# Patient Record
Sex: FEMALE | Race: BLACK OR AFRICAN AMERICAN | NOT HISPANIC OR LATINO | URBAN - METROPOLITAN AREA
[De-identification: names, ages, dates, MRNs, and addresses within clinical notes are randomized per-mention and may not be internally consistent; named-entity substitution may affect disease eponyms.]

---

## 2017-07-15 ENCOUNTER — INPATIENT (INPATIENT)
Facility: HOSPITAL | Age: 62
LOS: 24 days | Discharge: HOME | DRG: 885 | End: 2017-08-09
Attending: STUDENT IN AN ORGANIZED HEALTH CARE EDUCATION/TRAINING PROGRAM | Admitting: STUDENT IN AN ORGANIZED HEALTH CARE EDUCATION/TRAINING PROGRAM
Payer: MEDICARE

## 2017-07-15 VITALS
DIASTOLIC BLOOD PRESSURE: 69 MMHG | RESPIRATION RATE: 18 BRPM | SYSTOLIC BLOOD PRESSURE: 115 MMHG | TEMPERATURE: 98 F | HEART RATE: 102 BPM | OXYGEN SATURATION: 100 % | WEIGHT: 77.82 LBS

## 2017-07-15 DIAGNOSIS — F31.81 BIPOLAR II DISORDER: ICD-10-CM

## 2017-07-15 DIAGNOSIS — Z98.89 OTHER SPECIFIED POSTPROCEDURAL STATES: Chronic | ICD-10-CM

## 2017-07-15 LAB
ALBUMIN SERPL ELPH-MCNC: 4.1 G/DL — SIGNIFICANT CHANGE UP (ref 3.3–5)
ALP SERPL-CCNC: 96 U/L — SIGNIFICANT CHANGE UP (ref 40–120)
ALT FLD-CCNC: 14 U/L — SIGNIFICANT CHANGE UP (ref 10–45)
AMPHET UR-MCNC: NEGATIVE — SIGNIFICANT CHANGE UP
ANION GAP SERPL CALC-SCNC: 11 MMOL/L — SIGNIFICANT CHANGE UP (ref 5–17)
ANION GAP SERPL CALC-SCNC: 13 MMOL/L — SIGNIFICANT CHANGE UP (ref 5–17)
AST SERPL-CCNC: 37 U/L — SIGNIFICANT CHANGE UP (ref 10–40)
BARBITURATES UR SCN-MCNC: NEGATIVE — SIGNIFICANT CHANGE UP
BASOPHILS NFR BLD AUTO: 0.4 % — SIGNIFICANT CHANGE UP (ref 0–2)
BENZODIAZ UR-MCNC: NEGATIVE — SIGNIFICANT CHANGE UP
BILIRUB SERPL-MCNC: 0.2 MG/DL — SIGNIFICANT CHANGE UP (ref 0.2–1.2)
BUN SERPL-MCNC: 21 MG/DL — SIGNIFICANT CHANGE UP (ref 7–23)
BUN SERPL-MCNC: 22 MG/DL — SIGNIFICANT CHANGE UP (ref 7–23)
CALCIUM SERPL-MCNC: 8.8 MG/DL — SIGNIFICANT CHANGE UP (ref 8.4–10.5)
CALCIUM SERPL-MCNC: 9.2 MG/DL — SIGNIFICANT CHANGE UP (ref 8.4–10.5)
CHLORIDE SERPL-SCNC: 104 MMOL/L — SIGNIFICANT CHANGE UP (ref 96–108)
CHLORIDE SERPL-SCNC: 105 MMOL/L — SIGNIFICANT CHANGE UP (ref 96–108)
CO2 SERPL-SCNC: 24 MMOL/L — SIGNIFICANT CHANGE UP (ref 22–31)
CO2 SERPL-SCNC: 24 MMOL/L — SIGNIFICANT CHANGE UP (ref 22–31)
COCAINE METAB.OTHER UR-MCNC: NEGATIVE — SIGNIFICANT CHANGE UP
CREAT SERPL-MCNC: 0.7 MG/DL — SIGNIFICANT CHANGE UP (ref 0.5–1.3)
CREAT SERPL-MCNC: 0.8 MG/DL — SIGNIFICANT CHANGE UP (ref 0.5–1.3)
EOSINOPHIL NFR BLD AUTO: 1.4 % — SIGNIFICANT CHANGE UP (ref 0–6)
GLUCOSE SERPL-MCNC: 122 MG/DL — HIGH (ref 70–99)
GLUCOSE SERPL-MCNC: 93 MG/DL — SIGNIFICANT CHANGE UP (ref 70–99)
HCT VFR BLD CALC: 41.7 % — SIGNIFICANT CHANGE UP (ref 34.5–45)
HGB BLD-MCNC: 14.1 G/DL — SIGNIFICANT CHANGE UP (ref 11.5–15.5)
LYMPHOCYTES # BLD AUTO: 40.3 % — SIGNIFICANT CHANGE UP (ref 13–44)
MCHC RBC-ENTMCNC: 31.2 PG — SIGNIFICANT CHANGE UP (ref 27–34)
MCHC RBC-ENTMCNC: 33.8 G/DL — SIGNIFICANT CHANGE UP (ref 32–36)
MCV RBC AUTO: 92.3 FL — SIGNIFICANT CHANGE UP (ref 80–100)
METHADONE UR-MCNC: NEGATIVE — SIGNIFICANT CHANGE UP
MONOCYTES NFR BLD AUTO: 13.2 % — SIGNIFICANT CHANGE UP (ref 2–14)
NEUTROPHILS NFR BLD AUTO: 44.7 % — SIGNIFICANT CHANGE UP (ref 43–77)
OPIATES UR-MCNC: NEGATIVE — SIGNIFICANT CHANGE UP
PCP SPEC-MCNC: SIGNIFICANT CHANGE UP
PCP UR-MCNC: NEGATIVE — SIGNIFICANT CHANGE UP
PLATELET # BLD AUTO: 274 K/UL — SIGNIFICANT CHANGE UP (ref 150–400)
POTASSIUM SERPL-MCNC: 3.9 MMOL/L — SIGNIFICANT CHANGE UP (ref 3.5–5.3)
POTASSIUM SERPL-MCNC: 5.7 MMOL/L — HIGH (ref 3.5–5.3)
POTASSIUM SERPL-SCNC: 3.9 MMOL/L — SIGNIFICANT CHANGE UP (ref 3.5–5.3)
POTASSIUM SERPL-SCNC: 5.7 MMOL/L — HIGH (ref 3.5–5.3)
PROT SERPL-MCNC: 7.8 G/DL — SIGNIFICANT CHANGE UP (ref 6–8.3)
RBC # BLD: 4.52 M/UL — SIGNIFICANT CHANGE UP (ref 3.8–5.2)
RBC # FLD: 15.4 % — SIGNIFICANT CHANGE UP (ref 10.3–16.9)
SODIUM SERPL-SCNC: 139 MMOL/L — SIGNIFICANT CHANGE UP (ref 135–145)
SODIUM SERPL-SCNC: 142 MMOL/L — SIGNIFICANT CHANGE UP (ref 135–145)
THC UR QL: NEGATIVE — SIGNIFICANT CHANGE UP
VALPROATE SERPL-MCNC: <3 UG/ML — LOW (ref 50–100)
WBC # BLD: 4.8 K/UL — SIGNIFICANT CHANGE UP (ref 3.8–10.5)
WBC # FLD AUTO: 4.8 K/UL — SIGNIFICANT CHANGE UP (ref 3.8–10.5)

## 2017-07-15 PROCEDURE — 93010 ELECTROCARDIOGRAM REPORT: CPT

## 2017-07-15 PROCEDURE — 99285 EMERGENCY DEPT VISIT HI MDM: CPT | Mod: 25

## 2017-07-15 RX ORDER — SODIUM CHLORIDE 9 MG/ML
1000 INJECTION INTRAMUSCULAR; INTRAVENOUS; SUBCUTANEOUS ONCE
Qty: 0 | Refills: 0 | Status: COMPLETED | OUTPATIENT
Start: 2017-07-15 | End: 2017-07-15

## 2017-07-15 RX ORDER — DIVALPROEX SODIUM 500 MG/1
250 TABLET, DELAYED RELEASE ORAL ONCE
Qty: 0 | Refills: 0 | Status: COMPLETED | OUTPATIENT
Start: 2017-07-15 | End: 2017-07-15

## 2017-07-15 RX ORDER — MIRTAZAPINE 45 MG/1
7.5 TABLET, ORALLY DISINTEGRATING ORAL ONCE
Qty: 0 | Refills: 0 | Status: COMPLETED | OUTPATIENT
Start: 2017-07-15 | End: 2017-07-15

## 2017-07-15 RX ORDER — SODIUM CHLORIDE 9 MG/ML
3 INJECTION INTRAMUSCULAR; INTRAVENOUS; SUBCUTANEOUS ONCE
Qty: 0 | Refills: 0 | Status: COMPLETED | OUTPATIENT
Start: 2017-07-15 | End: 2017-07-15

## 2017-07-15 RX ADMIN — DIVALPROEX SODIUM 250 MILLIGRAM(S): 500 TABLET, DELAYED RELEASE ORAL at 21:17

## 2017-07-15 RX ADMIN — MIRTAZAPINE 7.5 MILLIGRAM(S): 45 TABLET, ORALLY DISINTEGRATING ORAL at 21:16

## 2017-07-15 RX ADMIN — SODIUM CHLORIDE 1000 MILLILITER(S): 9 INJECTION INTRAMUSCULAR; INTRAVENOUS; SUBCUTANEOUS at 18:36

## 2017-07-15 RX ADMIN — SODIUM CHLORIDE 3 MILLILITER(S): 9 INJECTION INTRAMUSCULAR; INTRAVENOUS; SUBCUTANEOUS at 16:53

## 2017-07-15 NOTE — ED ADULT NURSE NOTE - OBJECTIVE STATEMENT
Patient to ED alert and orientedx3, complaining of worsening depression for the last 3 days. Patient is accompanied by daughter and denies suicidal/homicidal ideation.

## 2017-07-15 NOTE — ED ADULT TRIAGE NOTE - ARRIVAL INFO ADDITIONAL COMMENTS
Pt presented to ED with depression. Pt denies SI nor HI at this time. Pt's daughter states "every time she drinks, she mentions that she wanna kill herself." Hx  of bipolar. Pt presented to ED with depression and loss of appetite. Pt denies SI nor HI at this time. Pt's daughter states "every time she drinks, she mentions that she wanna kill herself." Pt denies alcohol intake today. Hx of bipolar. No chest pain, no dizziness, no SOB. Pt is A&O x 3 and clam.

## 2017-07-15 NOTE — ED BEHAVIORAL HEALTH ASSESSMENT NOTE - SUMMARY
61 yo AA  female with history of bipolar II d/o, 1 prior admission on [facility] July 2016 for depressed mood and not eating, alcohol use, no prior SA, no legal issues, bib daughter for concern of depression and not eating.  Per ED provider, patient eloped due to feeling upset that she was not informed about coming to ED, she was brought back by [employer] called by nursing supervisor at ED, pt's currently denying SI/HI/AH/VH, no overt anxiety/depressive/manic/hypomanic symptoms seen.  Pt's presentation is most consistent with Bipolar II d/o MRE depressed in context of death of godson and bf, and medication noncompliance.  She does not meet criteria for involuntary admission, voluntary admission was offered but patient decline.  Pt's and her daughter are given outpatient f/u referral and they agree with plan. There is no contraindication for patient to be discharged home once medically cleared.  Case discussed with Dr. Mathew

## 2017-07-15 NOTE — ED BEHAVIORAL HEALTH ASSESSMENT NOTE - RISK ASSESSMENT
Static risk:  prior admission, depressive episode, alcohol use, age, domiciled alone, age.  Modifiable factors:  depressed mood  Protective factor:  supportive family, patient denies SI/HI, and is future forward.

## 2017-07-15 NOTE — ED PROVIDER NOTE - MEDICAL DECISION MAKING DETAILS
px walked out of the ED, alert oriented to person,place,time situation- denies SI- but appears very thin- [employer] contacted- daughter advised to call 911 when she finds her

## 2017-07-15 NOTE — ED BEHAVIORAL HEALTH ASSESSMENT NOTE - HPI (INCLUDE ILLNESS QUALITY, SEVERITY, DURATION, TIMING, CONTEXT, MODIFYING FACTORS, ASSOCIATED SIGNS AND SYMPTOMS)
61 yo AA  female with history of bipolar II d/o, 1 prior admission on [facility] July 2016 for depressed mood and not eating, alcohol use, no prior SA, no legal issues, bib daughter for concern of depression and not eating.  Per ED provider, patient eloped due to feeling upset that she was not informed about coming to ED, she was brought back by [employer] called by nursing supervisor at ED.    Patient is calm and cooperative with interview, she appears very thin, and states that she has been through a lot recently, her godson passed away about 1 month ago, and ex bf also passed away earlier this year.  Due to feeling depressed about her godson's death, she lost her appetite and has decreased po intake of food and fluid, which she describes as typical when she  is depressed.  When her daughter came back from [place] few days ago and got worried due to noticing her losing weight and not eating.  She was brought to the ED by her daughter and son in law, but did not find out where they were going until they got on a taxi.  Patient subsequently got upset because she did not expect this ED visit and also angry because she sense her son in law thought she was a burden to her daughter, and she eloped and left the ED.  She states that for the past two weeks, her sleep has not been good, with low energy, and concentration, but she still cleans her home, and denies of any SI/HI/AH/VH/overty anxiety/manic/hypomanic symptoms.  She states that she is finally feeling better the past two days, and started eating again.  She requested to get food and juice while at the ED as she feels hungry.  She however, denies alcohol drinking which was reported by her daughter, when confronted, she states that she drinks about 1-2 beers no more than twice weekly, denies any intoxication/WD associating symptoms.    As per pt's daughter, Abril Trinidad, patient has been preoccupied about her bf and godson's death and seems to be depressed though still able to take care of her granddaughter of 2 yo.  She does not think her mother is acutely suicide or would do anything to harm herself but because she was not eating/drinking, she is very concerned and wants her to get a medical and psychiatric eval.  She is given outpatient referral and is willing to take her mother to f/u outpatient.    As per chart, pt's VPA is < 3, she is likely not taking home meds Depakote 250mg po bid, and Remeron 7.5mg po qhs.

## 2017-07-15 NOTE — ED PROVIDER NOTE - PROGRESS NOTE DETAILS
returned to ED, seen by sai coleman agrees to be admitted to [facility], weight down 30lbs, not taking meds at home or taking care or herself

## 2017-07-15 NOTE — ED ADULT NURSE REASSESSMENT NOTE - NS ED NURSE REASSESS COMMENT FT1
No 1:1 in place at this time. Patient was assessed and given instructions on what to expect for the duration of ER visit. Patient verbalized understanding. At 1702 patient had eloped from hospital as per patient's daughter. LAURYN Camargo and MD Padgett made aware.

## 2017-07-15 NOTE — ED PROVIDER NOTE - PSYCHIATRIC, MLM
Alert and oriented to person, place, time/situation.denies SI but refusing to talk to me, states she wants to leave

## 2017-07-15 NOTE — ED PROVIDER NOTE - CARE PLAN
Principal Discharge DX:	Depression, unspecified depression type Principal Discharge DX:	Depression, unspecified depression type  Secondary Diagnosis:	Bipolar 2 disorder

## 2017-07-15 NOTE — ED PROVIDER NOTE - OBJECTIVE STATEMENT
62 F hx of bipolar DO and depression- brought in by daughter  px denied SI in front triage and to the primary nurse- to me states   "I want to leave" denies SI- as per daughter- px not taking care of herself  not eating and drinking- has voiced vague SI while drinking ETOH  has prior psych admissions here  denies etoh today  moderate-severe  exac alcohol  is not seeing a therapist as an outpx

## 2017-07-15 NOTE — ED BEHAVIORAL HEALTH ASSESSMENT NOTE - DESCRIPTION
Patient had episode of elopement and was brought back by [facility], after she returned, she was calm and cooperative with ED procedures. denies domiciled alone in [facility], with two adult children

## 2017-07-15 NOTE — ED ADULT NURSE REASSESSMENT NOTE - NS ED NURSE REASSESS COMMENT FT1
Patient previously eloped at 1702, and incident report submitted after Attending physician and ANM were notified. Patient returned to ED at 1736 and was placed on 1:1 with LAURYN Tamayo. Pending medical clearance and psych evaluation.

## 2017-07-16 DIAGNOSIS — F50.9 EATING DISORDER, UNSPECIFIED: ICD-10-CM

## 2017-07-16 LAB
ANION GAP SERPL CALC-SCNC: 14 MMOL/L — SIGNIFICANT CHANGE UP (ref 5–17)
BUN SERPL-MCNC: 23 MG/DL — SIGNIFICANT CHANGE UP (ref 7–23)
CALCIUM SERPL-MCNC: 9.2 MG/DL — SIGNIFICANT CHANGE UP (ref 8.4–10.5)
CHLORIDE SERPL-SCNC: 103 MMOL/L — SIGNIFICANT CHANGE UP (ref 96–108)
CO2 SERPL-SCNC: 25 MMOL/L — SIGNIFICANT CHANGE UP (ref 22–31)
CREAT SERPL-MCNC: 0.8 MG/DL — SIGNIFICANT CHANGE UP (ref 0.5–1.3)
GLUCOSE SERPL-MCNC: 118 MG/DL — HIGH (ref 70–99)
POTASSIUM SERPL-MCNC: 3.9 MMOL/L — SIGNIFICANT CHANGE UP (ref 3.5–5.3)
POTASSIUM SERPL-SCNC: 3.9 MMOL/L — SIGNIFICANT CHANGE UP (ref 3.5–5.3)
SODIUM SERPL-SCNC: 142 MMOL/L — SIGNIFICANT CHANGE UP (ref 135–145)

## 2017-07-16 RX ORDER — DIVALPROEX SODIUM 500 MG/1
250 TABLET, DELAYED RELEASE ORAL
Qty: 0 | Refills: 0 | Status: DISCONTINUED | OUTPATIENT
Start: 2017-07-16 | End: 2017-07-26

## 2017-07-16 RX ORDER — MIRTAZAPINE 45 MG/1
7.5 TABLET, ORALLY DISINTEGRATING ORAL AT BEDTIME
Qty: 0 | Refills: 0 | Status: DISCONTINUED | OUTPATIENT
Start: 2017-07-16 | End: 2017-07-25

## 2017-07-16 RX ADMIN — DIVALPROEX SODIUM 250 MILLIGRAM(S): 500 TABLET, DELAYED RELEASE ORAL at 10:00

## 2017-07-16 RX ADMIN — Medication 1 MILLIGRAM(S): at 21:45

## 2017-07-16 RX ADMIN — MIRTAZAPINE 7.5 MILLIGRAM(S): 45 TABLET, ORALLY DISINTEGRATING ORAL at 21:45

## 2017-07-16 RX ADMIN — DIVALPROEX SODIUM 250 MILLIGRAM(S): 500 TABLET, DELAYED RELEASE ORAL at 21:51

## 2017-07-16 NOTE — PROGRESS NOTE BEHAVIORAL HEALTH - NSBHFUPINTERVALHXFT_PSY_A_CORE
Patient was seen in room this morning, she tells me that she is okay, but that she is starving. She denies suicidal/homicidal ideation/plan/intent. She denies any AVH/paranoia, though of note staff felt like her reasons for not eating were somewhat paranoid. She states that she otherwise feels okay. She is sad that her daughter is worried about her as she was doing much better and has a big family that she enjoys spending time with. She tells me that she is hopeful for the future and hopes that she can start eating more and gaining more weight.

## 2017-07-17 DIAGNOSIS — M32.9 SYSTEMIC LUPUS ERYTHEMATOSUS, UNSPECIFIED: ICD-10-CM

## 2017-07-17 DIAGNOSIS — F31.4 BIPOLAR DISORDER, CURRENT EPISODE DEPRESSED, SEVERE, WITHOUT PSYCHOTIC FEATURES: ICD-10-CM

## 2017-07-17 DIAGNOSIS — F33.3 MAJOR DEPRESSIVE DISORDER, RECURRENT, SEVERE WITH PSYCHOTIC SYMPTOMS: ICD-10-CM

## 2017-07-17 DIAGNOSIS — Z78.9 OTHER SPECIFIED HEALTH STATUS: ICD-10-CM

## 2017-07-17 DIAGNOSIS — F33.2 MAJOR DEPRESSIVE DISORDER, RECURRENT SEVERE WITHOUT PSYCHOTIC FEATURES: ICD-10-CM

## 2017-07-17 PROCEDURE — 99223 1ST HOSP IP/OBS HIGH 75: CPT

## 2017-07-17 RX ORDER — METHOTREXATE 2.5 MG/1
10 TABLET ORAL
Qty: 0 | Refills: 0 | Status: DISCONTINUED | OUTPATIENT
Start: 2017-07-17 | End: 2017-07-17

## 2017-07-17 RX ORDER — HYDROXYCHLOROQUINE SULFATE 200 MG
200 TABLET ORAL
Qty: 0 | Refills: 0 | Status: DISCONTINUED | OUTPATIENT
Start: 2017-07-17 | End: 2017-08-09

## 2017-07-17 RX ORDER — FOLIC ACID 0.8 MG
1 TABLET ORAL DAILY
Qty: 0 | Refills: 0 | Status: DISCONTINUED | OUTPATIENT
Start: 2017-07-17 | End: 2017-08-09

## 2017-07-17 RX ORDER — ERGOCALCIFEROL 1.25 MG/1
50000 CAPSULE ORAL
Qty: 0 | Refills: 0 | Status: DISCONTINUED | OUTPATIENT
Start: 2017-07-17 | End: 2017-08-09

## 2017-07-17 RX ORDER — METHOTREXATE 2.5 MG/1
10 TABLET ORAL
Qty: 0 | Refills: 0 | Status: DISCONTINUED | OUTPATIENT
Start: 2017-07-17 | End: 2017-08-09

## 2017-07-17 RX ADMIN — DIVALPROEX SODIUM 250 MILLIGRAM(S): 500 TABLET, DELAYED RELEASE ORAL at 10:21

## 2017-07-17 RX ADMIN — Medication 200 MILLIGRAM(S): at 16:05

## 2017-07-17 NOTE — BEHAVIORAL HEALTH ASSESSMENT NOTE - DIFFERENTIAL
Bipolar II d/o MRE depressed, r/o eating d/o, other. MDD, severe, recurrent, psychotic features (r/i Bipolar disorder) BAD, MRE depressed

## 2017-07-17 NOTE — BEHAVIORAL HEALTH ASSESSMENT NOTE - CASE SUMMARY
patient is a 63 yo SAAF lives alone, on disability, h/o bipolar disorder, known to undersigned from prior inpatient psychiatric hospitalization at [facility], h/o alcohol and cocaine use, BIB daughter with worsening depression and inability to care for herself in the context of recent loss of her godson and medication non-compliance.  Pt has had decreased appetite, with consequential weight loss, insomnia, low energy, and decreased concetration, along with paranoid ideation re: neighbors and family.   She presents as a thin, emaciated women.   She denies SI/HI/AH/VH.  SHe does not apppear to be resopnding to internal stumuly. Euthymic affect. limited insight into illness.  Labs/Ekg review.  DDX- BAD, MRE depressed, severe, with psychotic features.  Resume VPA 250mg po BID given its effficacy in the past.  Will increase slowly given patient's stature and age.  Augment with Remeron 7.5mg qhs to address low mood and insomnia.  Aware of risk of conversion to edwardo.  benefits of Remeron exceed its risks.  Patient gives verbal consent to speak with her daughter.  Appreciate collateral from daughter as above. Cont tx as above for SLE.  I/G/M tx.  Discharge planning.

## 2017-07-17 NOTE — BEHAVIORAL HEALTH ASSESSMENT NOTE - OTHER
weak, fragile, poor dentition Difficulty walking on account of weakness/muscle atrophy/malnutrition Vague paranoid ideation noted Could not spell WORLD backwards (one letter error) became tearful while discussing loss of godson MMSE 25/30 emaciated, poor dentition

## 2017-07-17 NOTE — BEHAVIORAL HEALTH ASSESSMENT NOTE - DESCRIPTION
denies Lupus Arthritis on treatment "for years". Writer confirmed medication list with pharmacy ([phone]) : MTX 2.5mg 4 tabs Lupus Arthritis on treatment "for years". Writer confirmed medication list with pharmacy ([phone]) : MTX 2.5mg 4 tabs on Fri and Sat; HCQS 200mg PO BID, Prednisone 5mg PO BID, Vit D 50K Units once/wk

## 2017-07-17 NOTE — BEHAVIORAL HEALTH ASSESSMENT NOTE - PROBLEM SELECTOR PLAN 2
Confirmed meds with pharmacy; d/w Medical Consult Service; recommended resumption of OP regimen    -MTX 10mg on Fri and Saturday  -Prednisone 5mg PO BID  Hydroxychloroquine 200mg PO BID  -Vitamin D 50K units once/wk

## 2017-07-17 NOTE — BEHAVIORAL HEALTH ASSESSMENT NOTE - HPI (INCLUDE ILLNESS QUALITY, SEVERITY, DURATION, TIMING, CONTEXT, MODIFYING FACTORS, ASSOCIATED SIGNS AND SYMPTOMS)
62y AAF w/reported hx of Bipolar II, ETOH use BIB daughter w/depressive symptoms and paranoia precipitated/aggravated by recent passing of Godson.Writer spoke w/patient and daughter over phone. Pt has remained confined to her home for 1wk, with noted decrease in appetite, significant weight loss, poor concentration, anhedonia, low energy and disturbed sleep. Daughter noted that she frequently expressed paranoid thoughts (eg-food at restaurant being poisoned).Daughter also noted that pt. has become increasingly irritable over past few weeks and has had a tendency to monae (not a new behavior but possibly more severe than usual).She finally convinced her to come to hosp with great difficulty. Of note, pt. has not been compliant w/psych meds (VPA level <3). Per daughter, she tends to have episodes of excessive ETOH intake during which she stops taking medication and sometimes commences drinking in the morning.However, she has not been drinking in this manner over the last wk (ETOH level on admission <10). No withdrawal signs/symptoms at present. Denies SI/HI/AH/VH; expressed vague paranoid thoughts about "someone" being out to get her although she wasn't sure who. She expressed interest in taking medication, "eating good food", is open to attending groups and is presently not in acute distress. 62y AAF w/reported hx of Bipolar II, ETOH use BIB daughter w/depressive symptoms and paranoia precipitated/aggravated by recent passing of Godson.Writer spoke w/patient and daughter over phone. Pt has remained confined to her home for 1wk, with noted decrease in appetite, significant weight loss, poor concentration, anhedonia, low energy and disturbed sleep. Daughter noted that she frequently expressed paranoid thoughts (eg-food at restaurant being poisoned).Daughter also noted that pt. has become increasingly irritable over past few weeks and has had a tendency to monae (not a new behavior but possibly more severe than usual).She finally convinced her to come to hosp with great difficulty. Of note, pt. has not been compliant w/psych meds (VPA level <3). Per daughter, she tends to have episodes of excessive ETOH intake during which she stops taking medication and sometimes commences drinking in the morning.However, she has not been drinking in this manner over the last wk (ETOH level on admission <10). No withdrawal signs/symptoms at present. Denies SI/HI/AH/VH; expressed vague paranoid thoughts about "someone" being out to get her although she wasn't sure who. She expressed interest in taking medication, "eating good food", is open to attending groups and is presently not in acute distress.    Writer discussed pt's current meds with daughter, confirmed med list with pharmacy ([phone]). Pt. is on medication for SLE (see Plan). 62y AAF w/reported hx of Bipolar II, ETOH use BIB daughter w/depressive symptoms and paranoia precipitated/aggravated by recent passing of Godson.Writer spoke w/patient and daughter over phone. Pt has remained confined to her home for 1wk, with noted decrease in appetite, significant weight loss, poor concentration, anhedonia, low energy and disturbed sleep. Daughter noted that she frequently expressed paranoid thoughts (eg-food at restaurant being poisoned).Daughter also noted that pt. has become increasingly irritable over past few weeks and has had a tendency to monae (not a new behavior but possibly more severe than usual).She finally convinced her to come to hosp with great difficulty. Of note, pt. has not been compliant w/psych meds (VPA level <3). Per daughter, she tends to have episodes of excessive ETOH intake during which she stops taking medication and sometimes commences drinking in the morning.However, she has not been drinking in this manner over the last wk (ETOH level on admission <10). No withdrawal signs/symptoms at present. Denies SI/HI/AH/VH; expressed vague paranoid thoughts about "someone" being out to get her although she wasn't sure who. She expressed interest in taking medication, "eating good food", is open to attending groups and is presently not in acute distress.No si/sy of ETOH withdrawal at present.    Writer discussed pt's current meds with daughter, confirmed med list with pharmacy ([phone]). Pt. is on medication for SLE (see Plan).

## 2017-07-17 NOTE — BEHAVIORAL HEALTH ASSESSMENT NOTE - SUMMARY
63 yo AA  female with history of bipolar II d/o, 1 prior admission on [facility] July 2016 for depressed mood and not eating, alcohol use, no prior SA, no legal issues, bib daughter for concern of depression and not eating.  Per ED provider, patient eloped due to feeling upset that she was not informed about coming to ED, she was brought back by [employer] called by nursing supervisor at ED, pt's currently denying SI/HI/AH/VH, no overt anxiety/depressive/manic/hypomanic symptoms seen.  Pt's presentation is most consistent with Bipolar II d/o MRE depressed in context of death of godson and bf, and medication noncompliance.  She does not meet criteria for involuntary admission, voluntary admission was offered but patient decline.  Pt's and her daughter are given outpatient f/u referral and they agree with plan. There is no contraindication for patient to be discharged home once medically cleared.  Case discussed with Dr. Mathew 61 yo AA  female with reported diagnosis of bipolar II d/o, 1 prior admission on [facility], hx of alcohol use bib daughter w/ concerns of depressive symptoms and paranoid ideation that has worsened over last few weeks, aggravated by passing of godson and medication noncompliance. Pt currently endorses depressive symptoms and expressed vague paranoid ideation. Daughter corroborates pt's self-report of mental state. She is not suicidal/homicidal w/no hx of past attempts.Requires IP admission as she is unable to care for herself due to the severity of her depression. 63 yo AA  female with reported diagnosis of bipolar II d/o, 1 prior admission on [facility], hx of alcohol use bib daughter w/ concerns of depressive symptoms and paranoid ideation that has worsened over last few weeks, aggravated by passing of godson and medication noncompliance. Pt currently endorses depressive symptoms and expressed vague paranoid ideation. Daughter corroborates pt's self-report of mental state. She is not suicidal/homicidal w/no hx of past attempts.Requires IP admission as she is unable to care for herself due to the severity of her depression.She is presently not showing si/reporting sy of ETOH withdrawal. Goal is to optimize psych meds, ie, resume mood stabilizer and mirtazapine, monitor closely for mood improvement and ETOH withdrawal; and make changes to dosage as needed. 61 yo AA  female with reported diagnosis of bipolar II d/o, 1 prior admission on [facility], hx of alcohol use bib daughter w/ concerns of depressive symptoms and paranoid ideation that has worsened over last few weeks, aggravated by passing of godson and medication noncompliance. Pt currently endorses depressive symptoms and expressed vague paranoid ideation. Daughter corroborates pt's self-report of mental state. She is not suicidal/homicidal w/no hx of past attempts.Requires IP admission as she is unable to care for herself due to the severity of her depression.She is presently not showing si/reporting sy of ETOH withdrawal. Goal is to optimize psych meds, ie, resume mood stabilizer and mirtazapine, monitor closely for mood improvement and ETOH withdrawal; and make changes to dosage as needed.Resumed OP meds for SLE. Discussed UA results w/Medical Consult Service : no interventions at present as pt. is aymptomatic 63 yo AA  female with reported diagnosis of bipolar II d/o, 1 prior admission on [facility], hx of alcohol use bib daughter w/ concerns of depressive symptoms and paranoid ideation that has worsened over last few weeks, aggravated by passing of godson and medication noncompliance. Pt currently endorses depressive symptoms and expressed vague paranoid ideation. Daughter corroborates pt's self-report of mental state. She is not suicidal/homicidal w/no hx of past attempts.Requires IP admission as she is unable to care for herself due to the severity of her depression.She is presently not showing si/reporting sy of ETOH withdrawal. Goal is to optimize psych meds, ie, resume mood stabilizer and mirtazapine, monitor closely for mood improvement and ETOH withdrawal; and make changes to dosage as needed.Resumed OP meds for SLE. Discussed UA results w/Medical Consult Service : no interventions at present as pt. is symptomatic

## 2017-07-17 NOTE — BEHAVIORAL HEALTH ASSESSMENT NOTE - PRIMARY DX
Bipolar 2 disorder Major depressive disorder, recurrent episode, severe with anxious distress Bipolar 1 disorder, depressed, severe

## 2017-07-17 NOTE — BEHAVIORAL HEALTH ASSESSMENT NOTE - NSBHCHARTREVIEWVS_PSY_A_CORE FT
Vital Signs Last 24 Hrs  T(C): 36.1 (17 Jul 2017 09:22), Max: 36.9 (16 Jul 2017 23:31)  T(F): 97 (17 Jul 2017 09:22), Max: 98.5 (17 Jul 2017 06:00)  HR: 67 (17 Jul 2017 09:22) (67 - 80)  BP: 121/69 (17 Jul 2017 09:22) (117/72 - 155/71)  BP(mean): --  RR: 20 (17 Jul 2017 09:22) (16 - 20)  SpO2: --

## 2017-07-17 NOTE — BEHAVIORAL HEALTH ASSESSMENT NOTE - NSBHADMITCOUNSEL_PSY_A_CORE
importance of adherence to chosen treatment/instructions for management, treatment and follow up/risk factor reduction/diagnostic results/impressions and/or recommended studies/client/family/caregiver education/prognosis/risks and benefits of treatment options

## 2017-07-17 NOTE — BEHAVIORAL HEALTH ASSESSMENT NOTE - NSBHADMITTHERAPIES_PSY_A_CORE
Mil[facility] Therapy/Group Therapy/Individual Therapy daughter - phone/Individual Therapy/Family Education/Group Therapy/Milieu Therapy

## 2017-07-17 NOTE — BEHAVIORAL HEALTH ASSESSMENT NOTE - NSBHCHARTREVIEWLAB_PSY_A_CORE FT
CBC Full  -  ( 15 Jul 2017 17:54 )  WBC Count : 4.8 K/uL  Hemoglobin : 14.1 g/dL  Hematocrit : 41.7 %  Platelet Count - Automated : 274 K/uL  Mean Cell Volume : 92.3 fL  Mean Cell Hemoglobin : 31.2 pg  Mean Cell Hemoglobin Concentration : 33.8 g/dL  Auto Neutrophil # : x  Auto Lymphocyte # : x  Auto Monocyte # : x  Auto Eosinophil # : x  Auto Basophil # : x  Auto Neutrophil % : 44.7 %  Auto Lymphocyte % : 40.3 %  Auto Monocyte % : 13.2 %  Auto Eosinophil % : 1.4 %  Auto Basophil % : 0.4 %    CBC Full  -  ( 15 Jul 2017 17:54 )  WBC Count : 4.8 K/uL  Hemoglobin : 14.1 g/dL  Hematocrit : 41.7 %  Platelet Count - Automated : 274 K/uL  Mean Cell Volume : 92.3 fL  Mean Cell Hemoglobin : 31.2 pg  Mean Cell Hemoglobin Concentration : 33.8 g/dL  Auto Neutrophil # : x  Auto Lymphocyte # : x  Auto Monocyte # : x  Auto Eosinophil # : x  Auto Basophil # : x  Auto Neutrophil % : 44.7 %  Auto Lymphocyte % : 40.3 %  Auto Monocyte % : 13.2 %  Auto Eosinophil % : 1.4 %  Auto Basophil % : 0.4 %    Urinalysis Basic - ( 15 Jul 2017 19:29 )    Color: Yellow / Appearance: x / S.025 / pH: x  Gluc: x / Ketone: NEGATIVE  / Bili: NEGATIVE / Urobili: 0.2 E.U./dL   Blood: x / Protein: Trace mg/dL / Nitrite: NEGATIVE   Leuk Esterase: NEGATIVE / RBC: < 5 /HPF / WBC 5-10 /HPF   Sq Epi: x / Non Sq Epi: Moderate /HPF / Bacteria: Present /HPF

## 2017-07-17 NOTE — BEHAVIORAL HEALTH ASSESSMENT NOTE - DESCRIPTION (FIRST USE, LAST USE, QUANTITY, FREQUENCY, DURATION)
1-2 beers 2-3 times/weekly 1-2 beers 2-3 times/weekly (daughter notes a binge drinking pattern which is in contrast with patient's self report)

## 2017-07-17 NOTE — BEHAVIORAL HEALTH ASSESSMENT NOTE - AXIS III
denies SLE / Lupus arthritis on Rx (currently on compliant reportedly due to insurance loss) SLE / Lupus arthritis on Rx (currently non compliant reportedly due to insurance loss)

## 2017-07-17 NOTE — BEHAVIORAL HEALTH ASSESSMENT NOTE - NSBHADMITIPSTRENGTH_PSY_A_CORE
Cooperative with treatment Has supportive interpersonal relationships with family, friends or peers/Good impulse control/Has access to housing/residential stability/Cooperative with treatment

## 2017-07-17 NOTE — BEHAVIORAL HEALTH ASSESSMENT NOTE - NSBHADMITTHERAPIESTARGET_PSY_A_CORE FT
Mood regulation; coping skills; interpersonal relatioships; coping w/grief Mood regulation; coping skills; interpersonal relationships; coping w/grief

## 2017-07-17 NOTE — BEHAVIORAL HEALTH ASSESSMENT NOTE - RISK ASSESSMENT
Static risk:  prior admission, depressive episode, alcohol use, age, domiciled alone, age.  Modifiable factors:  depressed mood  Protective factor:  supportive family, patient denies SI/HI, and is future forward. Static risk:  prior admission, depressive episode, alcohol use, age, domiciled alone, age.  Modifiable factors:  depressed mood  Protective factor:  supportive family, patient denies SI/HI, and is future forward.    formulation : considering lack of past attempts and the fact that she presently denies SI (plus daughter's corroboration); pt. is currently at low risk of self-harm. Static risk:  prior admission, depressive episode, alcohol use, age, domiciled alone, age.  Modifiable factors:  depressed mood, recent non compliance with medications, paranoid ideation  Protective factor:  supportive family, patient denies SI/HI/AH/VH, and is future forward.    formulation : considering lack of past attempts and the fact that she presently denies SI (plus daughter's corroboration); pt. is currently at low risk of self-harm.

## 2017-07-18 PROCEDURE — 99232 SBSQ HOSP IP/OBS MODERATE 35: CPT

## 2017-07-18 RX ADMIN — Medication 200 MILLIGRAM(S): at 16:56

## 2017-07-18 RX ADMIN — MIRTAZAPINE 7.5 MILLIGRAM(S): 45 TABLET, ORALLY DISINTEGRATING ORAL at 22:47

## 2017-07-18 RX ADMIN — Medication 200 MILLIGRAM(S): at 07:45

## 2017-07-18 RX ADMIN — DIVALPROEX SODIUM 250 MILLIGRAM(S): 500 TABLET, DELAYED RELEASE ORAL at 10:52

## 2017-07-18 RX ADMIN — ERGOCALCIFEROL 50000 UNIT(S): 1.25 CAPSULE ORAL at 10:52

## 2017-07-18 RX ADMIN — Medication 1 MILLIGRAM(S): at 10:52

## 2017-07-18 RX ADMIN — Medication 5 MILLIGRAM(S): at 22:48

## 2017-07-18 RX ADMIN — Medication 5 MILLIGRAM(S): at 10:52

## 2017-07-18 RX ADMIN — DIVALPROEX SODIUM 250 MILLIGRAM(S): 500 TABLET, DELAYED RELEASE ORAL at 22:48

## 2017-07-18 NOTE — PROGRESS NOTE BEHAVIORAL HEALTH - RISK ASSESSMENT
Static risk:  prior admission, depressive episode, alcohol use, age, domiciled alone, age.  Modifiable factors:  depressed mood, recent non compliance with medications, paranoid ideation  Protective factor:  supportive family, patient denies SI/HI/AH/VH, and is future forward.    formulation : considering lack of past attempts and the fact that she presently denies SI (plus daughter's corroboration); pt. is currently at low risk of self-harm.

## 2017-07-18 NOTE — PROGRESS NOTE BEHAVIORAL HEALTH - NSBHFUPINTERVALHXFT_PSY_A_CORE
Patient is a 63 yo SAAF lives alone, on disability, h/o bipolar disorder, known to undersigned from prior inpatient psychiatric hospitalization at [facility], h/o alcohol and cocaine use, BIB daughter with worsening depression and inability to care for herself in the context of recent loss of her godson and medication non-compliance.  Pt has had decreased appetite, with consequential weight loss, insomnia, low energy, and decreased concentration, along with paranoid ideation re: neighbors and family.   Last night she did not accept hs medications. She remains pleasant and in behavioral control.  She reports good appetite.  Feels safe on the unit.  Visible and attends select groups.  She has been seen eating and drinking her Ensure. She presents as a thin, emaciated women.   She denies SI/HI/AH/VH.  SHe does not appear to be responding to internal stimuli.  Euthymic affect. limited insight into illness.  Labs/Ekg review.  DDX- BAD, MRE depressed, severe, with psychotic features.  Continue VPA 250mg po BID given its effficacy in the past.  Will increase slowly given patient's stature and age.  Augment with Remeron 7.5mg qhs to address low mood and insomnia.  Aware of risk of conversion to edwardo.  Benefits of Remeron exceed its risks.  Psychoeducation provided re: importance of treatment compliance.  Pt expressed understandinand accepted am medications.  Patient gives verbal consent to speak with her daughter.  Appreciate collateral from daughter as above. Cont tx as above for SLE.  I/G/M tx.  Discharge planning.

## 2017-07-18 NOTE — PROGRESS NOTE BEHAVIORAL HEALTH - SUMMARY
61 yo AA  female with reported diagnosis of bipolar II d/o, 1 prior admission on [facility], hx of alcohol use bib daughter w/ concerns of depressive symptoms and paranoid ideation that has worsened over last few weeks, aggravated by passing of godson and medication noncompliance. Pt currently endorses depressive symptoms and expressed vague paranoid ideation. Daughter corroborates pt's self-report of mental state. She is not suicidal/homicidal w/no hx of past attempts.Requires IP admission as she is unable to care for herself due to the severity of her depression.She is presently not showing si/reporting sy of ETOH withdrawal. Goal is to optimize psych meds, ie, resume mood stabilizer and mirtazapine, monitor closely for mood improvement and ETOH withdrawal; and make changes to dosage as needed.Resumed OP meds for SLE. Discussed UA results w/Medical Consult Service : no interventions at present as pt. is symptomatic

## 2017-07-18 NOTE — PROGRESS NOTE BEHAVIORAL HEALTH - NSBHCHARTREVIEWVS_PSY_A_CORE FT
Vital Signs Last 24 Hrs  T(C): 37 (18 Jul 2017 16:32), Max: 37 (18 Jul 2017 16:32)  T(F): 98.6 (18 Jul 2017 16:32), Max: 98.6 (18 Jul 2017 16:32)  HR: 76 (18 Jul 2017 16:32) (62 - 76)  BP: 125/76 (18 Jul 2017 16:32) (118/62 - 125/76)  BP(mean): --  RR: 18 (18 Jul 2017 16:32) (18 - 20)  SpO2: --

## 2017-07-18 NOTE — PROGRESS NOTE BEHAVIORAL HEALTH - OTHER
emaciated, poor dentition Difficulty walking on account of weakness/muscle atrophy/malnutrition became tearful while discussing loss of godson Vague paranoid ideation noted Could not spell WORLD backwards (one letter error) MMSE 25/30

## 2017-07-19 PROCEDURE — 99232 SBSQ HOSP IP/OBS MODERATE 35: CPT

## 2017-07-19 RX ADMIN — Medication 5 MILLIGRAM(S): at 22:19

## 2017-07-19 RX ADMIN — DIVALPROEX SODIUM 250 MILLIGRAM(S): 500 TABLET, DELAYED RELEASE ORAL at 22:19

## 2017-07-19 RX ADMIN — MIRTAZAPINE 7.5 MILLIGRAM(S): 45 TABLET, ORALLY DISINTEGRATING ORAL at 22:19

## 2017-07-19 RX ADMIN — Medication 200 MILLIGRAM(S): at 17:05

## 2017-07-19 RX ADMIN — Medication 200 MILLIGRAM(S): at 07:46

## 2017-07-19 RX ADMIN — Medication 1 MILLIGRAM(S): at 09:53

## 2017-07-19 RX ADMIN — DIVALPROEX SODIUM 250 MILLIGRAM(S): 500 TABLET, DELAYED RELEASE ORAL at 09:52

## 2017-07-19 RX ADMIN — Medication 5 MILLIGRAM(S): at 09:53

## 2017-07-19 NOTE — PROGRESS NOTE BEHAVIORAL HEALTH - OTHER
emaciated, poor dentition Difficulty walking on account of weakness/muscle atrophy/malnutrition became tearful while discussing loss of godson Vague paranoia

## 2017-07-19 NOTE — PROGRESS NOTE BEHAVIORAL HEALTH - NSBHCHARTREVIEWVS_PSY_A_CORE FT
Vital Signs Last 24 Hrs  T(C): 36.4 (19 Jul 2017 10:00), Max: 37 (18 Jul 2017 16:32)  T(F): 97.6 (19 Jul 2017 10:00), Max: 98.6 (18 Jul 2017 16:32)  HR: 61 (19 Jul 2017 10:00) (61 - 76)  BP: 106/52 (19 Jul 2017 10:00) (106/52 - 125/76)  BP(mean): --  RR: 17 (19 Jul 2017 10:00) (17 - 18)  SpO2: --

## 2017-07-19 NOTE — PROGRESS NOTE BEHAVIORAL HEALTH - NSBHFUPINTERVALHXFT_PSY_A_CORE
Patient is a 63 yo SAAF lives alone, on disability, h/o bipolar disorder, known to undersigned from prior inpatient psychiatric hospitalization at [facility], h/o alcohol and cocaine use, BIB daughter with worsening depression and inability to care for herself in the context of recent loss of her godson and medication non-compliance.  Pt has had decreased appetite, with consequential weight loss, insomnia, low energy, and decreased concentration, along with paranoid ideation re: neighbors and family.  She remains pleasant and in behavioral control.  She reports good appetite.  Feels safe on the unit.  Visible and attends select groups.  She has been seen eating and drinking her Ensure. She presents as a thin, emaciated women.   She denies SI/HI/AH/VH.  SHe does not appear to be responding to internal stimuli.  Euthymic affect. limited insight into illness.  Labs/Ekg review.  DDX- BAD, MRE depressed, severe, with psychotic features.  Continue VPA 250mg po BID given its efficacy in the past.  Will increase slowly given patient's stature and age.  Augment with Remeron 7.5mg qhs to address low mood and insomnia.  Aware of risk of conversion to edwardo.  Benefits of Remeron exceed its risks.  Psychoeducation provided re: importance of treatment compliance.  Pt expressed understanding and accepted am medications.  Patient gives verbal consent to speak with her family.  Appreciate collateral from daughter.  Team to follow up with patient's mother as well. Cont tx as above for SLE.  I/G/M tx.  Discharge planning.

## 2017-07-20 PROCEDURE — 99232 SBSQ HOSP IP/OBS MODERATE 35: CPT

## 2017-07-20 RX ORDER — MAGNESIUM HYDROXIDE 400 MG/1
30 TABLET, CHEWABLE ORAL DAILY
Qty: 0 | Refills: 0 | Status: DISCONTINUED | OUTPATIENT
Start: 2017-07-20 | End: 2017-08-09

## 2017-07-20 RX ADMIN — Medication 200 MILLIGRAM(S): at 11:10

## 2017-07-20 RX ADMIN — DIVALPROEX SODIUM 250 MILLIGRAM(S): 500 TABLET, DELAYED RELEASE ORAL at 22:06

## 2017-07-20 RX ADMIN — Medication 200 MILLIGRAM(S): at 18:48

## 2017-07-20 RX ADMIN — Medication 5 MILLIGRAM(S): at 11:10

## 2017-07-20 RX ADMIN — Medication 5 MILLIGRAM(S): at 22:06

## 2017-07-20 RX ADMIN — MIRTAZAPINE 7.5 MILLIGRAM(S): 45 TABLET, ORALLY DISINTEGRATING ORAL at 22:06

## 2017-07-20 RX ADMIN — DIVALPROEX SODIUM 250 MILLIGRAM(S): 500 TABLET, DELAYED RELEASE ORAL at 11:10

## 2017-07-20 RX ADMIN — Medication 1 MILLIGRAM(S): at 11:10

## 2017-07-20 NOTE — PROGRESS NOTE BEHAVIORAL HEALTH - SUMMARY
63 yo AA  female with reported diagnosis of bipolar II d/o, 1 prior admission on [facility], hx of alcohol use bib daughter w/ concerns of depressive symptoms and paranoid ideation that has worsened over last few weeks, aggravated by passing of godson and medication noncompliance. Pt currently endorses depressive symptoms and expressed vague paranoid ideation. Daughter corroborates pt's self-report of mental state. She is not suicidal/homicidal w/no hx of past attempts.Requires IP admission as she is unable to care for herself due to the severity of her depression.She is presently not showing si/reporting sy of ETOH withdrawal. Goal is to optimize psych meds, ie, resume mood stabilizer and mirtazapine, monitor closely for mood improvement and ETOH withdrawal; and make changes to dosage as needed.Resumed OP meds for SLE. Discussed UA results w/Medical Consult Service : no interventions at present as pt. is symptomatic. As of 7/20, there's noted improvement of mood/sleep/appetite and is more visible/active on the unit.

## 2017-07-20 NOTE — PROGRESS NOTE BEHAVIORAL HEALTH - OTHER
emaciated, poor dentition Difficulty walking on account of weakness/muscle atrophy/malnutrition Less paranoid Less depressed became tearful while discussing loss of godson

## 2017-07-20 NOTE — PROGRESS NOTE BEHAVIORAL HEALTH - NSBHFUPINTERVALHXFT_PSY_A_CORE
More visible on unit; greeted writer with smile on approach; reported some improvement in mood/sleep/appetite; writer discussed phone call with daughter; pt. reported that she may visit soon in a positive tone; did not express paranoid thoughts/ideas during today's eval; denied SI/HI/AH/VH; compliant w/standing meds;no side effects noted or observed.

## 2017-07-20 NOTE — PROGRESS NOTE BEHAVIORAL HEALTH - CASE SUMMARY
Patient is a 61 yo SAAF lives alone, on disability, h/o bipolar disorder, known to undersigned from prior inpatient psychiatric hospitalization at [facility], h/o alcohol and cocaine use, BIB daughter with worsening depression and inability to care for herself in the context of recent loss of her godson and medication non-compliance.  Pt has had decreased appetite, with consequential weight loss, insomnia, low energy, and decreased concentration, along with paranoid ideation re: neighbors and family.  She remains pleasant and in behavioral control.  She reports good appetite.  Feels safe on the unit.  Visible and attends select groups.  She has been seen eating and drinking her Ensure. She presents as a thin, emaciated women.   She denies SI/HI/AH/VH.  SHe does not appear to be responding to internal stimuli.  Euthymic affect. limited insight into illness.  No evidence of paranoia elicited today.  Labs/Ekg review.  DDX- BAD, MRE depressed, severe, with psychotic features.  Continue VPA 250mg po BID given its efficacy in the past.  Will increase slowly given patient's stature and age as needed.  Augment with Remeron 7.5mg qhs to address low mood and insomnia.  Aware of risk of conversion to edwardo.  Benefits (incl assistance with mood and appetite) of Remeron exceed its risks.  Psychoeducation provided re: importance of treatment compliance.  Pt expressed understanding and accepted am medications.  Encourage po intake/hydration.  Patient gives verbal consent to speak with her family.  Appreciate collateral from daughter.  Team to follow up with patient's mother as well. Cont tx as above for SLE.  I/G/M tx.  Discharge planning in place.

## 2017-07-21 LAB — VALPROATE SERPL-MCNC: 34 UG/ML — LOW (ref 50–100)

## 2017-07-21 PROCEDURE — 99232 SBSQ HOSP IP/OBS MODERATE 35: CPT

## 2017-07-21 RX ADMIN — DIVALPROEX SODIUM 250 MILLIGRAM(S): 500 TABLET, DELAYED RELEASE ORAL at 22:56

## 2017-07-21 RX ADMIN — Medication 1 MILLIGRAM(S): at 10:23

## 2017-07-21 RX ADMIN — Medication 200 MILLIGRAM(S): at 16:01

## 2017-07-21 RX ADMIN — DIVALPROEX SODIUM 250 MILLIGRAM(S): 500 TABLET, DELAYED RELEASE ORAL at 10:23

## 2017-07-21 RX ADMIN — MIRTAZAPINE 7.5 MILLIGRAM(S): 45 TABLET, ORALLY DISINTEGRATING ORAL at 22:56

## 2017-07-21 RX ADMIN — Medication 5 MILLIGRAM(S): at 22:56

## 2017-07-21 RX ADMIN — METHOTREXATE 10 MILLIGRAM(S): 2.5 TABLET ORAL at 10:22

## 2017-07-21 RX ADMIN — Medication 5 MILLIGRAM(S): at 10:23

## 2017-07-21 RX ADMIN — Medication 200 MILLIGRAM(S): at 07:40

## 2017-07-21 NOTE — PROGRESS NOTE BEHAVIORAL HEALTH - OTHER
emaciated, poor dentition Difficulty walking on account of weakness/muscle atrophy/malnutrition Less depressed became tearful while discussing loss of godson Intermittently anxious

## 2017-07-21 NOTE — PROGRESS NOTE BEHAVIORAL HEALTH - PROBLEM SELECTOR PLAN 1
c/w Depakote 250mg PO BID  c/w Mirtazapine 7.5mg PO QHS (sleep and mood) c/w Depakote 250mg PO BID   c/w Mirtazapine 7.5mg PO QHS (sleep and mood)  VPA level today - 34

## 2017-07-21 NOTE — PROGRESS NOTE BEHAVIORAL HEALTH - NSBHCHARTREVIEWVS_PSY_A_CORE FT
Vital Signs Last 24 Hrs  T(C): 36.4 (21 Jul 2017 09:42), Max: 37 (20 Jul 2017 17:00)  T(F): 97.5 (21 Jul 2017 09:42), Max: 98.6 (20 Jul 2017 17:00)  HR: 67 (21 Jul 2017 09:42) (67 - 75)  BP: 128/57 (21 Jul 2017 09:42) (128/57 - 137/60)  BP(mean): --  RR: 20 (21 Jul 2017 09:42) (17 - 20)  SpO2: --

## 2017-07-21 NOTE — PROGRESS NOTE BEHAVIORAL HEALTH - NSBHFUPINTERVALHXFT_PSY_A_CORE
Reported improvement in mood; smiled at writer on approach; was pleasant throughout conversation; noted to walk around unit more often and engage in group sessions; sleep/appetite have improved; did not express paranoid thoughts/ideas during today's eval; denied SI/HI/AH/VH; compliant w/standing meds;no side effects noted or observed.Writer discussed medcations w/patient; and plan to stay on course with treatment w/discharge discussions to ensure early next week. Reported improvement in mood; smiled at writer on approach; was pleasant throughout conversation; noted to walk around unit more often and engage in group sessions; sleep/appetite have improved; did not express paranoid thoughts/ideas during today's eval; denied SI/HI/AH/VH; compliant w/standing meds;no side effects noted or observed.Writer discussed medcations w/patient; and plan to stay on course with treatment w/discharge discussions to ensue early next week if pt's clinical condition continues to improve over weekend

## 2017-07-21 NOTE — PROGRESS NOTE BEHAVIORAL HEALTH - SUMMARY
61 yo AA  female with reported diagnosis of bipolar II d/o, 1 prior admission on [facility], hx of alcohol use bib daughter w/ concerns of depressive symptoms and paranoid ideation that has worsened over last few weeks, aggravated by passing of godson and medication noncompliance. Pt currently endorses depressive symptoms and expressed vague paranoid ideation. Daughter corroborates pt's self-report of mental state. She is not suicidal/homicidal w/no hx of past attempts.Requires IP admission as she is unable to care for herself due to the severity of her depression.She is presently not showing si/reporting sy of ETOH withdrawal. Goal is to optimize psych meds, ie, resume mood stabilizer and mirtazapine, monitor closely for mood improvement and ETOH withdrawal; and make changes to dosage as needed.Resumed OP meds for SLE. Discussed UA results w/Medical Consult Service : no interventions at present as pt. is symptomatic. As of 7/20, there's noted improvement of mood/sleep/appetite and is more visible/active on the unit. 61 yo AA  female with reported diagnosis of bipolar II d/o, 1 prior admission on [facility], hx of alcohol use bib daughter w/ concerns of depressive symptoms and paranoid ideation that has worsened over last few weeks, aggravated by passing of godson and medication noncompliance. Pt currently endorses depressive symptoms and expressed vague paranoid ideation. Daughter corroborates pt's self-report of mental state. She is not suicidal/homicidal w/no hx of past attempts.Requires IP admission as she is unable to care for herself due to the severity of her depression.She is presently not showing si/reporting sy of ETOH withdrawal. Goal is to optimize psych meds, ie, resume mood stabilizer and mirtazapine, monitor closely for mood improvement and ETOH withdrawal; and make changes to dosage as needed.Resumed OP meds for SLE. Discussed UA results w/Medical Consult Service : no interventions at present as pt. is symptomatic. As of 7/21, there's noted improvement of mood/sleep/appetite and is more visible/active on the unit.

## 2017-07-21 NOTE — PROGRESS NOTE BEHAVIORAL HEALTH - CASE SUMMARY
Patient is a 61 yo SAAF lives alone, on disability, h/o bipolar disorder, known to undersigned from prior inpatient psychiatric hospitalization at [facility], h/o alcohol and cocaine use, BIB daughter with worsening depression and inability to care for herself in the context of recent loss of her godson and medication non-compliance.  Pt has had decreased appetite, with consequential weight loss, insomnia, low energy, and decreased concentration, along with paranoid ideation re: neighbors and family.  She remains pleasant and in behavioral control.  She reports good appetite.  Feels safe on the unit.  Visible and attends select groups.  She has been seen eating and drinking her Ensure. She presents as a thin, emaciated women.   She denies SI/HI/AH/VH.  SHe does not appear to be responding to internal stimuli.  Euthymic affect. limited insight into illness.  No evidence of paranoia elicited today.  Labs/Ekg review.  DDX- BAD, MRE depressed, severe, with psychotic features.  Continue VPA 250mg po BID given its efficacy in the past.  Will increase slowly given patient's stature and age as needed.  Augment with Remeron 7.5mg qhs to address low mood and insomnia.  Aware of risk of conversion to edwardo.  Benefits (incl assistance with mood and appetite) of Remeron exceed its risks.  Psychoeducation provided re: importance of treatment compliance.  Pt expressed understanding and accepted am medications.  Encourage po intake/hydration.  Patient gives verbal consent to speak with her family.  Appreciate collateral from daughter.  Team to follow up with patient's mother as well. Cont tx as above for SLE.  I/G/M tx.  Discharge planning in place. Patient is a 63 yo SAAF lives alone, on disability, h/o bipolar disorder, known to undersigned from prior inpatient psychiatric hospitalization at [facility], h/o alcohol and cocaine use, BIB daughter with worsening depression and inability to care for herself in the context of recent loss of her godson and medication non-compliance.  Pt has had decreased appetite, with consequential weight loss, insomnia, low energy, and decreased concentration, along with paranoid ideation re: neighbors and family.  She remains pleasant and in behavioral control.  She reports good appetite.  Feels safe on the unit.  Visible and attends select groups.  She has been seen eating and drinking her Ensure. She presents as a thin, emaciated women.   She denies SI/HI/AH/VH.  SHe does not appear to be responding to internal stimuli.  Euthymic affect. limited insight into illness.  No evidence of paranoia elicited today.  Labs/Ekg review.  VPA level - 34.  DDX- BAD, MRE depressed, severe, with psychotic features.  Continue VPA 250mg po BID given its efficacy in the past.  Despite subtherapeutic VPA level, will not increase VPA dose at this time given there is no current clinical indication for an increase in medication.  Will increase slowly given patient's stature and age as needed. Augment with Remeron 7.5mg qhs to address low mood and insomnia.  Aware of risk of conversion to edwardo.  Benefits (incl assistance with mood and appetite) of Remeron exceed its risks.  Psychoeducation provided re: importance of treatment compliance.  Pt expressed understanding and accepted am medications.  Encourage po intake/hydration.  Patient gives verbal consent to speak with her family.  Appreciate collateral from daughter.  Team to follow up with patient's mother as well. Cont tx as above for SLE.  I/G/M tx.  Discharge planning in place.

## 2017-07-22 RX ADMIN — METHOTREXATE 10 MILLIGRAM(S): 2.5 TABLET ORAL at 20:04

## 2017-07-22 RX ADMIN — DIVALPROEX SODIUM 250 MILLIGRAM(S): 500 TABLET, DELAYED RELEASE ORAL at 09:49

## 2017-07-22 RX ADMIN — Medication 1 MILLIGRAM(S): at 09:49

## 2017-07-22 RX ADMIN — Medication 200 MILLIGRAM(S): at 16:20

## 2017-07-22 RX ADMIN — Medication 5 MILLIGRAM(S): at 09:49

## 2017-07-22 RX ADMIN — Medication 200 MILLIGRAM(S): at 07:57

## 2017-07-23 RX ADMIN — DIVALPROEX SODIUM 250 MILLIGRAM(S): 500 TABLET, DELAYED RELEASE ORAL at 21:30

## 2017-07-23 RX ADMIN — DIVALPROEX SODIUM 250 MILLIGRAM(S): 500 TABLET, DELAYED RELEASE ORAL at 10:43

## 2017-07-23 RX ADMIN — Medication 200 MILLIGRAM(S): at 15:49

## 2017-07-23 RX ADMIN — Medication 5 MILLIGRAM(S): at 21:30

## 2017-07-23 RX ADMIN — MIRTAZAPINE 7.5 MILLIGRAM(S): 45 TABLET, ORALLY DISINTEGRATING ORAL at 21:30

## 2017-07-23 RX ADMIN — Medication 1 MILLIGRAM(S): at 10:43

## 2017-07-23 RX ADMIN — Medication 200 MILLIGRAM(S): at 10:43

## 2017-07-23 RX ADMIN — Medication 5 MILLIGRAM(S): at 10:43

## 2017-07-24 PROCEDURE — 99232 SBSQ HOSP IP/OBS MODERATE 35: CPT

## 2017-07-24 RX ADMIN — DIVALPROEX SODIUM 250 MILLIGRAM(S): 500 TABLET, DELAYED RELEASE ORAL at 22:56

## 2017-07-24 RX ADMIN — Medication 5 MILLIGRAM(S): at 11:06

## 2017-07-24 RX ADMIN — Medication 200 MILLIGRAM(S): at 07:16

## 2017-07-24 RX ADMIN — Medication 1 MILLIGRAM(S): at 11:06

## 2017-07-24 RX ADMIN — MIRTAZAPINE 7.5 MILLIGRAM(S): 45 TABLET, ORALLY DISINTEGRATING ORAL at 22:52

## 2017-07-24 RX ADMIN — Medication 5 MILLIGRAM(S): at 22:56

## 2017-07-24 RX ADMIN — DIVALPROEX SODIUM 250 MILLIGRAM(S): 500 TABLET, DELAYED RELEASE ORAL at 11:07

## 2017-07-24 RX ADMIN — Medication 200 MILLIGRAM(S): at 16:25

## 2017-07-24 NOTE — PROGRESS NOTE BEHAVIORAL HEALTH - SUMMARY
61 yo AA  female with reported diagnosis of bipolar II d/o, 1 prior admission on [facility], hx of alcohol use bib daughter w/ concerns of depressive symptoms and paranoid ideation that has worsened over last few weeks, aggravated by passing of godson and medication noncompliance. Pt currently endorses depressive symptoms and expressed vague paranoid ideation. Daughter corroborates pt's self-report of mental state. She is not suicidal/homicidal w/no hx of past attempts.Requires IP admission as she is unable to care for herself due to the severity of her depression.She is presently not showing si/reporting sy of ETOH withdrawal. Goal is to optimize psych meds, ie, resume mood stabilizer and mirtazapine, monitor closely for mood improvement and ETOH withdrawal; and make changes to dosage as needed.Resumed OP meds for SLE.

## 2017-07-24 NOTE — PROGRESS NOTE BEHAVIORAL HEALTH - NSBHFUPINTERVALHXFT_PSY_A_CORE
Patient seen in her room. She is laying on bed, doing stretching exercises. She reports that her mood is much better because "they're feeding me now." She states that she wasn't being fed by staff earlier in her admission, but now she is getting the meals that she wants and is feeling better. Appetite is reportedly improved and she states that she is eating 100% of meals. She reports getting restful sleep at night. She is looking forward to having family visit later in the evening. No thoughts of harming self or others. No a/v hallucinations or paranoia.

## 2017-07-25 DIAGNOSIS — F31.9 BIPOLAR DISORDER, UNSPECIFIED: ICD-10-CM

## 2017-07-25 PROCEDURE — 99232 SBSQ HOSP IP/OBS MODERATE 35: CPT

## 2017-07-25 RX ORDER — OLANZAPINE 15 MG/1
2.5 TABLET, FILM COATED ORAL AT BEDTIME
Qty: 0 | Refills: 0 | Status: DISCONTINUED | OUTPATIENT
Start: 2017-07-25 | End: 2017-08-02

## 2017-07-25 RX ADMIN — Medication 1 MILLIGRAM(S): at 10:27

## 2017-07-25 RX ADMIN — Medication 200 MILLIGRAM(S): at 16:30

## 2017-07-25 RX ADMIN — Medication 200 MILLIGRAM(S): at 07:46

## 2017-07-25 RX ADMIN — Medication 5 MILLIGRAM(S): at 10:27

## 2017-07-25 RX ADMIN — Medication 5 MILLIGRAM(S): at 22:36

## 2017-07-25 RX ADMIN — OLANZAPINE 2.5 MILLIGRAM(S): 15 TABLET, FILM COATED ORAL at 22:36

## 2017-07-25 RX ADMIN — DIVALPROEX SODIUM 250 MILLIGRAM(S): 500 TABLET, DELAYED RELEASE ORAL at 10:27

## 2017-07-25 RX ADMIN — DIVALPROEX SODIUM 250 MILLIGRAM(S): 500 TABLET, DELAYED RELEASE ORAL at 22:37

## 2017-07-25 RX ADMIN — ERGOCALCIFEROL 50000 UNIT(S): 1.25 CAPSULE ORAL at 11:05

## 2017-07-25 NOTE — PROGRESS NOTE BEHAVIORAL HEALTH - PROBLEM SELECTOR PLAN 1
c/w Depakote 250mg PO BID   discontinue Mirtazapine 7.5mg PO QHS (sleep and mood)  Zyprexa 2.5mg QHS initiated

## 2017-07-25 NOTE — PROGRESS NOTE BEHAVIORAL HEALTH - NSBHFUPINTERVALHXFT_PSY_A_CORE
Patient seen ambulating in the [facility]o without assisted device. She is pleasant and cooperative. She reports sleeping well last night and is in a good mood today. She is future oriented and states that she had a good visit with her family yesterday. She denies si/hi. No a/v hallucinations or paranoia. Patient shared with me that she was able to read my thoughts and sometimes heard voices in her head, telling her things about other people. She stated she has heard voices for over 37 years. Upon clarification she became irritable and she recanted and stated that there was miscommunication she didn't really hear others thoughts. She is noted to elevated, laughing loudly, gesturing frequently and raising her legs in the air doing exercises.

## 2017-07-25 NOTE — PROGRESS NOTE BEHAVIORAL HEALTH - SUMMARY
61 yo AA  female with reported diagnosis of bipolar II d/o, 1 prior admission on [facility], hx of alcohol use bib daughter w/ concerns of depressive symptoms and paranoid ideation that has worsened over last few weeks, aggravated by passing of godson and medication noncompliance. Pt currently endorses depressive symptoms and expressed vague paranoid ideation. Daughter corroborates pt's self-report of mental state. She is not suicidal/homicidal w/no hx of past attempts.Requires IP admission as she is unable to care for herself due to the severity of her depression.She is presently not showing si/reporting sy of ETOH withdrawal. Goal is to optimize psych meds, ie, resume mood stabilizer and mirtazapine, monitor closely for mood improvement and ETOH withdrawal; and make changes to dosage as needed.Resumed OP meds for SLE.    Patient is noted to be elevated, displaying hypomanic symptoms and expressing hallucinations and delusions. Zyprexa 2.5mg initiated. Will discontinue Remeron 7.5mg.

## 2017-07-26 PROCEDURE — 99232 SBSQ HOSP IP/OBS MODERATE 35: CPT

## 2017-07-26 RX ORDER — DIVALPROEX SODIUM 500 MG/1
500 TABLET, DELAYED RELEASE ORAL AT BEDTIME
Qty: 0 | Refills: 0 | Status: DISCONTINUED | OUTPATIENT
Start: 2017-07-26 | End: 2017-07-31

## 2017-07-26 RX ORDER — DIVALPROEX SODIUM 500 MG/1
250 TABLET, DELAYED RELEASE ORAL DAILY
Qty: 0 | Refills: 0 | Status: DISCONTINUED | OUTPATIENT
Start: 2017-07-27 | End: 2017-07-31

## 2017-07-26 RX ADMIN — OLANZAPINE 2.5 MILLIGRAM(S): 15 TABLET, FILM COATED ORAL at 22:03

## 2017-07-26 RX ADMIN — Medication 1 MILLIGRAM(S): at 10:52

## 2017-07-26 RX ADMIN — Medication 5 MILLIGRAM(S): at 22:03

## 2017-07-26 RX ADMIN — Medication 5 MILLIGRAM(S): at 10:52

## 2017-07-26 RX ADMIN — DIVALPROEX SODIUM 500 MILLIGRAM(S): 500 TABLET, DELAYED RELEASE ORAL at 22:03

## 2017-07-26 RX ADMIN — Medication 200 MILLIGRAM(S): at 18:28

## 2017-07-26 RX ADMIN — Medication 200 MILLIGRAM(S): at 07:58

## 2017-07-26 RX ADMIN — DIVALPROEX SODIUM 250 MILLIGRAM(S): 500 TABLET, DELAYED RELEASE ORAL at 10:52

## 2017-07-26 NOTE — PROGRESS NOTE BEHAVIORAL HEALTH - SUMMARY
63 yo AA  female with reported diagnosis of bipolar II d/o, 1 prior admission on [facility], hx of alcohol use bib daughter w/ concerns of depressive symptoms and paranoid ideation that has worsened over last few weeks, aggravated by passing of godson and medication noncompliance. Pt currently endorses depressive symptoms and expressed vague paranoid ideation. Daughter corroborates pt's self-report of mental state. She is not suicidal/homicidal w/no hx of past attempts.Requires IP admission as she is unable to care for herself due to the severity of her depression.She is presently not showing si/reporting sy of ETOH withdrawal. Goal is to optimize psych meds, ie, resume mood stabilizer and mirtazapine, monitor closely for mood improvement and ETOH withdrawal; and make changes to dosage as needed.Resumed OP meds for SLE.    Patient is tolerating addition of Zyprexa 2.5mg QHS to her regimen well with no reported or observed side effects or adverse reactions. She is noted to be of pressured speech, hyperverbal and with elevated mood. Continued adjustment of medication is warranted to address mood symptoms. Will Increase QHS Depakote to 500mg and continue to observe and monitor.

## 2017-07-26 NOTE — PROGRESS NOTE BEHAVIORAL HEALTH - NSBHFUPINTERVALHXFT_PSY_A_CORE
Patient reports having a good visit with family again last night and had 'the best sleep ever' last night. She reported waking up feeling very rested. She is tolerating medication regimen well, no reported side effects. She states that she feels happy and funny and overall in a good mood. She is noted to be hyperverbal, loudly laughing and dancing and exercising in the halls. She denies a/v hallucinations or paranoia, but states that she is able to read people emotions and qualities. No thoughts of harming herself or others. Is future oriented. She has been attending several groups and seen appropriately interacting with peers.

## 2017-07-27 PROCEDURE — 99232 SBSQ HOSP IP/OBS MODERATE 35: CPT

## 2017-07-27 RX ADMIN — Medication 1 MILLIGRAM(S): at 11:10

## 2017-07-27 RX ADMIN — Medication 5 MILLIGRAM(S): at 11:10

## 2017-07-27 RX ADMIN — Medication 200 MILLIGRAM(S): at 18:31

## 2017-07-27 RX ADMIN — Medication 5 MILLIGRAM(S): at 21:23

## 2017-07-27 RX ADMIN — DIVALPROEX SODIUM 250 MILLIGRAM(S): 500 TABLET, DELAYED RELEASE ORAL at 11:10

## 2017-07-27 RX ADMIN — Medication 200 MILLIGRAM(S): at 07:51

## 2017-07-27 RX ADMIN — OLANZAPINE 2.5 MILLIGRAM(S): 15 TABLET, FILM COATED ORAL at 21:23

## 2017-07-27 RX ADMIN — DIVALPROEX SODIUM 500 MILLIGRAM(S): 500 TABLET, DELAYED RELEASE ORAL at 21:24

## 2017-07-27 NOTE — PROGRESS NOTE BEHAVIORAL HEALTH - NSBHFUPINTERVALHXFT_PSY_A_CORE
Patient is noted to be irritable and sarcastic today. She states that she is unhappy with her meals today as she did not get the meals that she chose. She also stated that she feels ready for discharge as August is quickly approaching and she has bills to pay and 'live my life." She denies any side effects of current medication regimen. No adverse effects noted. Sleep and appetite are reported to be fair. Though she is perseverative on specific foods that she wants, she is able to be verbally redirected. She is visible on the unit at time, attending select groups and observed in her room doing exercises. ADLs are fair and she is noted to be wearing several bracelets that she made during art group. She denies a/v hallucinations.

## 2017-07-27 NOTE — PROGRESS NOTE BEHAVIORAL HEALTH - SUMMARY
61 yo AA  female with reported diagnosis of bipolar II d/o, 1 prior admission on [facility], hx of alcohol use bib daughter w/ concerns of depressive symptoms and paranoid ideation that has worsened over last few weeks, aggravated by passing of godson and medication noncompliance. Pt currently endorses depressive symptoms and expressed vague paranoid ideation. Daughter corroborates pt's self-report of mental state. She is not suicidal/homicidal w/no hx of past attempts.Requires IP admission as she is unable to care for herself due to the severity of her depression.She is presently not showing si/reporting sy of ETOH withdrawal. Goal is to optimize psych meds, ie, resume mood stabilizer and mirtazapine, monitor closely for mood improvement and ETOH withdrawal; and make changes to dosage as needed.Resumed OP meds for SLE.    Patient continues to tolerate medication regimen well with no reported or observed side effects or adverse reactions. Today she is irritable. Will continue current medication regimen and make any necessary changes tomorrow.

## 2017-07-28 PROCEDURE — 99232 SBSQ HOSP IP/OBS MODERATE 35: CPT

## 2017-07-28 RX ADMIN — Medication 5 MILLIGRAM(S): at 22:06

## 2017-07-28 RX ADMIN — Medication 1 MILLIGRAM(S): at 09:48

## 2017-07-28 RX ADMIN — DIVALPROEX SODIUM 500 MILLIGRAM(S): 500 TABLET, DELAYED RELEASE ORAL at 22:06

## 2017-07-28 RX ADMIN — OLANZAPINE 2.5 MILLIGRAM(S): 15 TABLET, FILM COATED ORAL at 22:06

## 2017-07-28 RX ADMIN — Medication 200 MILLIGRAM(S): at 09:49

## 2017-07-28 RX ADMIN — METHOTREXATE 10 MILLIGRAM(S): 2.5 TABLET ORAL at 10:51

## 2017-07-28 RX ADMIN — DIVALPROEX SODIUM 250 MILLIGRAM(S): 500 TABLET, DELAYED RELEASE ORAL at 09:49

## 2017-07-28 RX ADMIN — Medication 5 MILLIGRAM(S): at 09:49

## 2017-07-28 RX ADMIN — Medication 200 MILLIGRAM(S): at 16:30

## 2017-07-28 NOTE — PROGRESS NOTE BEHAVIORAL HEALTH - NSBHFUPINTERVALHXFT_PSY_A_CORE
Patient is seen in her room in her bed writing out her menu for the upcoming day. She perseverates on not getting her desired food choices, specifically peanut butter and jelly sandwiches. She states that if she doesn't get what she wants, she gives her meals away. She has no other concerns, sleep was reported as 'good' she stated that she fell asleep directly after receiving her medications and awoke at 0430 and still feels rested. Appetite is fair. She denies a/v hallucinations or paranoia. No thoughts of harming herself or others.

## 2017-07-28 NOTE — PROGRESS NOTE BEHAVIORAL HEALTH - SUMMARY
63 yo AA  female with reported diagnosis of bipolar II d/o, 1 prior admission on [facility], hx of alcohol use bib daughter w/ concerns of depressive symptoms and paranoid ideation that has worsened over last few weeks, aggravated by passing of godson and medication noncompliance. Pt currently endorses depressive symptoms and expressed vague paranoid ideation. Daughter corroborates pt's self-report of mental state. She is not suicidal/homicidal w/no hx of past attempts.Requires IP admission as she is unable to care for herself due to the severity of her depression.She is presently not showing si/reporting sy of ETOH withdrawal. Goal is to optimize psych meds, ie, resume mood stabilizer and mirtazapine, monitor closely for mood improvement and ETOH withdrawal; and make changes to dosage as needed.Resumed OP meds for SLE.    Patient continues to tolerate medication regimen well with no reported or observed side effects or adverse reactions. She is future oriented, looking forward to visit from her family later in the day and discharge next week. Mood is noted to be elevated and she states that she is 'happy and great'

## 2017-07-29 RX ADMIN — Medication 5 MILLIGRAM(S): at 21:24

## 2017-07-29 RX ADMIN — Medication 200 MILLIGRAM(S): at 16:30

## 2017-07-29 RX ADMIN — DIVALPROEX SODIUM 500 MILLIGRAM(S): 500 TABLET, DELAYED RELEASE ORAL at 21:24

## 2017-07-29 RX ADMIN — OLANZAPINE 2.5 MILLIGRAM(S): 15 TABLET, FILM COATED ORAL at 21:24

## 2017-07-29 RX ADMIN — DIVALPROEX SODIUM 250 MILLIGRAM(S): 500 TABLET, DELAYED RELEASE ORAL at 09:23

## 2017-07-29 RX ADMIN — Medication 5 MILLIGRAM(S): at 09:23

## 2017-07-29 RX ADMIN — Medication 1 MILLIGRAM(S): at 09:23

## 2017-07-29 RX ADMIN — Medication 200 MILLIGRAM(S): at 07:51

## 2017-07-29 RX ADMIN — METHOTREXATE 10 MILLIGRAM(S): 2.5 TABLET ORAL at 16:31

## 2017-07-30 RX ADMIN — OLANZAPINE 2.5 MILLIGRAM(S): 15 TABLET, FILM COATED ORAL at 22:18

## 2017-07-30 RX ADMIN — Medication 5 MILLIGRAM(S): at 22:19

## 2017-07-30 RX ADMIN — DIVALPROEX SODIUM 500 MILLIGRAM(S): 500 TABLET, DELAYED RELEASE ORAL at 22:19

## 2017-07-30 RX ADMIN — Medication 5 MILLIGRAM(S): at 10:45

## 2017-07-30 RX ADMIN — Medication 200 MILLIGRAM(S): at 17:19

## 2017-07-30 RX ADMIN — Medication 200 MILLIGRAM(S): at 07:51

## 2017-07-30 RX ADMIN — DIVALPROEX SODIUM 250 MILLIGRAM(S): 500 TABLET, DELAYED RELEASE ORAL at 10:45

## 2017-07-30 RX ADMIN — Medication 1 MILLIGRAM(S): at 10:45

## 2017-07-31 PROCEDURE — 99232 SBSQ HOSP IP/OBS MODERATE 35: CPT

## 2017-07-31 RX ORDER — DIVALPROEX SODIUM 500 MG/1
500 TABLET, DELAYED RELEASE ORAL
Qty: 0 | Refills: 0 | Status: DISCONTINUED | OUTPATIENT
Start: 2017-07-31 | End: 2017-08-01

## 2017-07-31 RX ORDER — BACITRACIN ZINC 500 UNIT/G
1 OINTMENT IN PACKET (EA) TOPICAL
Qty: 0 | Refills: 0 | Status: DISCONTINUED | OUTPATIENT
Start: 2017-07-31 | End: 2017-08-09

## 2017-07-31 RX ADMIN — Medication 200 MILLIGRAM(S): at 07:30

## 2017-07-31 RX ADMIN — Medication 200 MILLIGRAM(S): at 16:36

## 2017-07-31 RX ADMIN — Medication 5 MILLIGRAM(S): at 10:16

## 2017-07-31 RX ADMIN — Medication 1 MILLIGRAM(S): at 10:17

## 2017-07-31 RX ADMIN — DIVALPROEX SODIUM 500 MILLIGRAM(S): 500 TABLET, DELAYED RELEASE ORAL at 21:44

## 2017-07-31 RX ADMIN — Medication 5 MILLIGRAM(S): at 21:45

## 2017-07-31 RX ADMIN — OLANZAPINE 2.5 MILLIGRAM(S): 15 TABLET, FILM COATED ORAL at 21:45

## 2017-07-31 RX ADMIN — DIVALPROEX SODIUM 250 MILLIGRAM(S): 500 TABLET, DELAYED RELEASE ORAL at 10:15

## 2017-07-31 RX ADMIN — Medication 1 APPLICATION(S): at 21:45

## 2017-07-31 NOTE — PROGRESS NOTE BEHAVIORAL HEALTH - CASE SUMMARY
Patient is a 61 yo SAAF lives alone, on disability, h/o bipolar disorder, known to undersigned from prior inpatient psychiatric hospitalization at [facility], h/o alcohol and cocaine use, BIB daughter with worsening depression and inability to care for herself in the context of recent loss of her godson and medication non-compliance.  Pt has had decreased appetite, with consequential weight loss, insomnia, low energy, and decreased concentration, along with paranoid ideation re: neighbors and family.  This past week, patient presented with elevated mood and increase in paranoia.  Therefore, VPA being optimized, mirtazapine discontinued, and olanzapine started.  Patient remains pleasant and in behavioral control.  She reports good appetite.  Feels safe on the unit.  Visible and attends select groups.  She has been seen eating and drinking her Ensure. She presents as a thin, emaciated women.   She denies SI/HI/AH/VH.  SHe does not appear to be responding to internal stimuli.  + paranoid delusions.  Elevated affect, mildly pressured speech. limited insight into illness.  DDX- BAD, MRE mixed   Labs/Ekg review. Past VPA level - 34 on 250mg po BID, will obtain repeat VPA trough level, CBC (to monitor for thrombocytopenia), CMP (to montior for hepatotoxicity) for tomorrow morning prior to her morning dose of VPA to assess level on increased dose of VPA.  Will continue increasing meds slowly given patient's small body stature/age.  Psychoeducation provided re: importance of treatment compliance.  Pt expressed understanding and accepted am medications.  Encourage po intake/hydration.  Patient gives verbal consent to speak with her family.  Appreciate collateral from daughter.  Team to follow up with patient's mother as well. Cont tx as above for SLE.  I/G/M tx.  Discharge planning in place.

## 2017-07-31 NOTE — PROGRESS NOTE BEHAVIORAL HEALTH - NSBHCHARTREVIEWVS_PSY_A_CORE FT
Vital Signs Last 24 Hrs  T(C): 36.7 (31 Jul 2017 10:29), Max: 36.7 (31 Jul 2017 10:29)  T(F): 98.1 (31 Jul 2017 10:29), Max: 98.1 (31 Jul 2017 10:29)  HR: 88 (31 Jul 2017 10:29) (88 - 89)  BP: 114/67 (31 Jul 2017 10:29) (114/67 - 131/74)  BP(mean): --  RR: 18 (31 Jul 2017 10:29) (18 - 18)  SpO2: --

## 2017-07-31 NOTE — PROGRESS NOTE BEHAVIORAL HEALTH - PROBLEM SELECTOR PLAN 1
VPA level on 8/1 AM  Continue Depakote 250mg PO daily and Depakote 500mg QHS   Zyprexa 2.5mg QHS VPA level on 8/1 AM  Increase Depakote to 500mg PO BID  continue Zyprexa 2.5mg QHS VPA level (trough) on 8/1 AM  Increase Depakote to 500mg PO BID  continue Zyprexa 2.5mg QHS

## 2017-07-31 NOTE — PROGRESS NOTE BEHAVIORAL HEALTH - NSBHFUPINTERVALHXFT_PSY_A_CORE
Evaluated in conference room; mood continues to be elated; became quite tangential during the evaluation and spoke about past events in life that seemed to be somewhat unrelated to the subject of discussion. Reported that her sleep is not disturbed at present. Denied racing thoughts. Not irritable. Preoccupied with choice of food available on the unit. Requested bacitracin ointment for her finger which reportedly got injured when she flushed the toilet(writer examined fingers : no wounds; minor swelling noted near nail fold of left index finger - pt reported that her fingers have been this way for years due to SLE). Reported that her relationship w/daughter and sister have improved since admission; continue to be acutely concerned about losing independence and her daughter making decisions for her. Denied SI/HI/AH/VH; no delusions verbalized; continues to be compliant with standing meds; no side effects noted/observed. Discussed all of patient's medications with her and educated her about the role of mood stabilizers in her current condition.

## 2017-07-31 NOTE — PROGRESS NOTE BEHAVIORAL HEALTH - SUMMARY
63 yo AA  female with reported diagnosis of bipolar II d/o, 1 prior admission on [facility], hx of alcohol use bib daughter w/ concerns of depressive symptoms and paranoid ideation that has worsened over last few weeks, aggravated by passing of godson and medication noncompliance. Pt currently endorses depressive symptoms and expressed vague paranoid ideation. Daughter corroborates pt's self-report of mental state. She is not suicidal/homicidal w/no hx of past attempts.Requires IP admission as she is unable to care for herself due to the severity of her depression.She is presently not showing si/reporting sy of ETOH withdrawal. Goal is to optimize psych meds, ie, resume mood stabilizer and mirtazapine, monitor closely for mood improvement and ETOH withdrawal; and make changes to dosage as needed.Resumed OP meds for SLE.    Patient continues to tolerate medication regimen well with no reported or observed side effects or adverse reactions. She is future oriented, looking forward to visit from her family later in the day and discharge next week. Mood is noted to be elevated and she states that she is 'happy and great' 63 yo AA  female with reported diagnosis of bipolar II d/o, 1 prior admission on [facility], hx of alcohol use bib daughter w/ concerns of depressive symptoms and paranoid ideation that has worsened over last few weeks, aggravated by passing of godson and medication noncompliance. Pt currently endorses depressive symptoms and expressed vague paranoid ideation. Daughter corroborates pt's self-report of mental state. She is not suicidal/homicidal w/no hx of past attempts.Requires IP admission as she is unable to care for herself due to the severity of her depression.She is presently not showing si/reporting sy of ETOH withdrawal. Initially, resumed on mood stabilizer and mirtazapine, monitored closely for mood improvement and ETOH withdrawal and resumed OP meds for SLE. On 7/25, Mirtazapine was discontinued and Zyprexa was started at 2.5mg due to concerns of hypomania/manic symptoms.On 7/26, Depakote was increased from 500mg PO BID to 250mg AM + 500mg PM. Pt continues to have an elated mood and mildly pressured speech though her sleep/appetite are well preserved.Plan is to continue titration of depakote as needed in order to achieve better mood stability.    Patient continues to tolerate medication regimen well with no reported or observed side effects or adverse reactions. She is future oriented, looking forward to visit from her family later in the day and discharge next week. Mood is noted to be elevated and she states that she is 'happy and great' 63 yo AA  female with reported diagnosis of bipolar II d/o, 1 prior admission on [facility], hx of alcohol use bib daughter w/ concerns of depressive symptoms and paranoid ideation that has worsened over last few weeks, aggravated by passing of godson and medication noncompliance. Pt currently endorses depressive symptoms and expressed vague paranoid ideation. Daughter corroborates pt's self-report of mental state. She is not suicidal/homicidal w/no hx of past attempts.Requires IP admission as she is unable to care for herself due to the severity of her depression.She is presently not showing si/reporting sy of ETOH withdrawal. Initially, resumed on mood stabilizer and mirtazapine, monitored closely for mood improvement and ETOH withdrawal and resumed OP meds for SLE. On 7/25, Mirtazapine was discontinued and Zyprexa was started at 2.5mg due to concerns of hypomania/manic symptoms.On 7/26, Depakote was increased from 500mg PO BID to 250mg AM + 500mg PM. Pt continues to have an elated mood and mildly pressured speech though her sleep/appetite are well preserved.Plan is to continue titration of depakote (increase to 500mg BID on 7/31) as needed in order to achieve better mood stability.    Patient continues to tolerate medication regimen well with no reported or observed side effects or adverse reactions. She is future oriented, looking forward to visit from her family later in the day and discharge next week. Mood is noted to be elevated and she states that she is 'happy and great' 63 yo AA  female with reported diagnosis of bipolar II d/o, 1 prior admission on [facility], hx of alcohol use bib daughter w/ concerns of depressive symptoms and paranoid ideation that has worsened over last few weeks, aggravated by passing of godson and medication noncompliance. Pt initially presented with depressive symptoms and expressed vague paranoid ideation. Daughter corroborates pt's self-report of mental state. She is not suicidal/homicidal w/no hx of past attempts.  Required IP admission as she is unable to care for herself due to the severity of her depression.  She is presently not showing si/reporting sy of ETOH withdrawal. Initially, resumed on mood stabilizer and mirtazapine, monitored closely for mood improvement and ETOH withdrawal and resumed OP meds for SLE. On 7/25, Mirtazapine was discontinued and Zyprexa was started at 2.5mg due to concerns of hypomania/manic symptoms.  On 7/26, Depakote was increased from 500mg PO BID to 250mg AM + 500mg PM. Pt continues to have an elated mood and mildly pressured speech though her sleep/appetite are well preserved.Plan is to continue titration of depakote (increase to 500mg BID on 7/31) as needed in order to achieve better mood stability.    Patient continues to tolerate medication regimen well with no reported or observed side effects or adverse reactions. She is future oriented, looking forward to visit from her family later in the day and discharge next week. Mood is noted to be elevated and she states that she is 'happy and great' 63 yo AA  female with reported diagnosis of bipolar II d/o, 1 prior admission on [facility], hx of alcohol use bib daughter w/ concerns of depressive symptoms and paranoid ideation that has worsened over last few weeks, aggravated by passing of godson and medication noncompliance. Pt initially presented with depressive symptoms and expressed vague paranoid ideation. Daughter corroborates pt's self-report of mental state. She is not suicidal/homicidal w/no hx of past attempts.  Required IP admission as she is unable to care for herself due to the severity of her depression.  She is presently not showing si/reporting sy of ETOH withdrawal. Initially, resumed on mood stabilizer and mirtazapine, monitored closely for mood improvement and ETOH withdrawal and resumed OP meds for SLE. On 7/25, Mirtazapine was discontinued and Zyprexa was started at 2.5mg due to concerns of hypomania/manic symptoms.  On 7/26, Depakote was increased from 250mg PO BID to 250mg AM + 500mg PM. Pt continues to have an elated mood and mildly pressured speech though her sleep/appetite are well preserved.  Patient continues to tolerate medication regimen well with no reported or observed side effects or adverse reactions. She is future oriented, looking forward to visit from her family later in the day and discharge next week. Mood is noted to be elevated and she states that she is 'happy and great.'  Plan is to continue titration of depakote (increase to 500mg BID on 7/31) as needed in order to achieve improved mood stability.

## 2017-08-01 LAB
ALBUMIN SERPL ELPH-MCNC: 3.7 G/DL — SIGNIFICANT CHANGE UP (ref 3.3–5)
ALP SERPL-CCNC: 93 U/L — SIGNIFICANT CHANGE UP (ref 40–120)
ALT FLD-CCNC: 18 U/L — SIGNIFICANT CHANGE UP (ref 10–45)
ANION GAP SERPL CALC-SCNC: 10 MMOL/L — SIGNIFICANT CHANGE UP (ref 5–17)
AST SERPL-CCNC: 22 U/L — SIGNIFICANT CHANGE UP (ref 10–40)
BILIRUB SERPL-MCNC: 0.2 MG/DL — SIGNIFICANT CHANGE UP (ref 0.2–1.2)
BUN SERPL-MCNC: 27 MG/DL — HIGH (ref 7–23)
CALCIUM SERPL-MCNC: 9.5 MG/DL — SIGNIFICANT CHANGE UP (ref 8.4–10.5)
CHLORIDE SERPL-SCNC: 104 MMOL/L — SIGNIFICANT CHANGE UP (ref 96–108)
CO2 SERPL-SCNC: 26 MMOL/L — SIGNIFICANT CHANGE UP (ref 22–31)
CREAT SERPL-MCNC: 0.7 MG/DL — SIGNIFICANT CHANGE UP (ref 0.5–1.3)
GLUCOSE SERPL-MCNC: 103 MG/DL — HIGH (ref 70–99)
HCT VFR BLD CALC: 39.4 % — SIGNIFICANT CHANGE UP (ref 34.5–45)
HGB BLD-MCNC: 12.9 G/DL — SIGNIFICANT CHANGE UP (ref 11.5–15.5)
MCHC RBC-ENTMCNC: 30.9 PG — SIGNIFICANT CHANGE UP (ref 27–34)
MCHC RBC-ENTMCNC: 32.7 G/DL — SIGNIFICANT CHANGE UP (ref 32–36)
MCV RBC AUTO: 94.5 FL — SIGNIFICANT CHANGE UP (ref 80–100)
PLATELET # BLD AUTO: 334 K/UL — SIGNIFICANT CHANGE UP (ref 150–400)
POTASSIUM SERPL-MCNC: 4.7 MMOL/L — SIGNIFICANT CHANGE UP (ref 3.5–5.3)
POTASSIUM SERPL-SCNC: 4.7 MMOL/L — SIGNIFICANT CHANGE UP (ref 3.5–5.3)
PROT SERPL-MCNC: 7.2 G/DL — SIGNIFICANT CHANGE UP (ref 6–8.3)
RBC # BLD: 4.17 M/UL — SIGNIFICANT CHANGE UP (ref 3.8–5.2)
RBC # FLD: 15.9 % — SIGNIFICANT CHANGE UP (ref 10.3–16.9)
SODIUM SERPL-SCNC: 140 MMOL/L — SIGNIFICANT CHANGE UP (ref 135–145)
VALPROATE SERPL-MCNC: 90 UG/ML — SIGNIFICANT CHANGE UP (ref 50–100)
WBC # BLD: 9.3 K/UL — SIGNIFICANT CHANGE UP (ref 3.8–10.5)
WBC # FLD AUTO: 9.3 K/UL — SIGNIFICANT CHANGE UP (ref 3.8–10.5)

## 2017-08-01 PROCEDURE — 99232 SBSQ HOSP IP/OBS MODERATE 35: CPT

## 2017-08-01 RX ORDER — DIVALPROEX SODIUM 500 MG/1
500 TABLET, DELAYED RELEASE ORAL AT BEDTIME
Qty: 0 | Refills: 0 | Status: DISCONTINUED | OUTPATIENT
Start: 2017-08-01 | End: 2017-08-09

## 2017-08-01 RX ORDER — DIVALPROEX SODIUM 500 MG/1
250 TABLET, DELAYED RELEASE ORAL DAILY
Qty: 0 | Refills: 0 | Status: DISCONTINUED | OUTPATIENT
Start: 2017-08-01 | End: 2017-08-09

## 2017-08-01 RX ADMIN — Medication 200 MILLIGRAM(S): at 07:33

## 2017-08-01 RX ADMIN — Medication 1 APPLICATION(S): at 10:18

## 2017-08-01 RX ADMIN — Medication 1 MILLIGRAM(S): at 10:17

## 2017-08-01 RX ADMIN — ERGOCALCIFEROL 50000 UNIT(S): 1.25 CAPSULE ORAL at 10:18

## 2017-08-01 RX ADMIN — Medication 200 MILLIGRAM(S): at 17:56

## 2017-08-01 RX ADMIN — Medication 1 APPLICATION(S): at 21:26

## 2017-08-01 RX ADMIN — Medication 5 MILLIGRAM(S): at 10:17

## 2017-08-01 RX ADMIN — OLANZAPINE 2.5 MILLIGRAM(S): 15 TABLET, FILM COATED ORAL at 21:26

## 2017-08-01 RX ADMIN — DIVALPROEX SODIUM 500 MILLIGRAM(S): 500 TABLET, DELAYED RELEASE ORAL at 21:26

## 2017-08-01 RX ADMIN — Medication 5 MILLIGRAM(S): at 21:26

## 2017-08-01 RX ADMIN — DIVALPROEX SODIUM 500 MILLIGRAM(S): 500 TABLET, DELAYED RELEASE ORAL at 10:18

## 2017-08-01 NOTE — PROGRESS NOTE BEHAVIORAL HEALTH - SUMMARY
61 yo AA  female with reported diagnosis of bipolar II d/o, 1 prior admission on [facility], hx of alcohol use bib daughter w/ concerns of depressive symptoms and paranoid ideation that has worsened over last few weeks, aggravated by passing of godson and medication noncompliance. Pt initially presented with depressive symptoms and expressed vague paranoid ideation. Daughter corroborates pt's self-report of mental state. She is not suicidal/homicidal w/no hx of past attempts.  Required IP admission as she is unable to care for herself due to the severity of her depression.  She is presently not showing si/reporting sy of ETOH withdrawal. Initially, resumed on mood stabilizer and mirtazapine, monitored closely for mood improvement and ETOH withdrawal and resumed OP meds for SLE. On 7/25, Mirtazapine was discontinued and Zyprexa was started at 2.5mg due to concerns of hypomania/manic symptoms.  On 7/26, Depakote was increased from 250mg PO BID to 250mg AM + 500mg PM. Pt continues to have an elated mood and mildly pressured speech though her sleep/appetite are well preserved.  Patient continues to tolerate medication regimen well with no reported or observed side effects or adverse reactions. She is future oriented, looking forward to visit from her family later in the day and discharge next week. Mood is noted to be elevated and she states that she is 'happy and great.'  Will hold off titration of depakote given vpa level

## 2017-08-01 NOTE — PROGRESS NOTE BEHAVIORAL HEALTH - NSBHCHARTREVIEWVS_PSY_A_CORE FT
Vital Signs Last 24 Hrs  T(C): 36.4 (01 Aug 2017 08:51), Max: 36.9 (31 Jul 2017 17:00)  T(F): 97.5 (01 Aug 2017 08:51), Max: 98.5 (31 Jul 2017 17:00)  HR: 76 (01 Aug 2017 08:51) (76 - 97)  BP: 145/82 (01 Aug 2017 08:51) (123/76 - 145/82)  BP(mean): --  RR: 18 (01 Aug 2017 08:51) (18 - 18)  SpO2: --

## 2017-08-01 NOTE — PROGRESS NOTE BEHAVIORAL HEALTH - RISK ASSESSMENT
Static risk:  prior admission, depressive episode, alcohol use, age, domiciled alone  Modifiable factors:  recent non compliance with medications, paranoid ideation, elevated mood  Protective factor:  supportive family, patient denies SI/HI/AH/VH, and is future forward.  formulation : considering lack of past attempts and the fact that she presently denies SI (plus daughter's corroboration); pt. is currently at low risk of self-harm.

## 2017-08-01 NOTE — PROGRESS NOTE BEHAVIORAL HEALTH - PROBLEM SELECTOR PLAN 1
Labs reviewed.  VPA level (trough) - 90   Will continue Depakote 250mg qam/ 500mg qhs  continue Zyprexa 2.5mg QHS

## 2017-08-01 NOTE — PROGRESS NOTE BEHAVIORAL HEALTH - NSBHCHARTREVIEWLAB_PSY_A_CORE FT
CBC Full  -  ( 15 Jul 2017 17:54 )  WBC Count : 4.8 K/uL  Hemoglobin : 14.1 g/dL  Hematocrit : 41.7 %  Platelet Count - Automated : 274 K/uL  Mean Cell Volume : 92.3 fL  Mean Cell Hemoglobin : 31.2 pg  Mean Cell Hemoglobin Concentration : 33.8 g/dL  Auto Neutrophil # : x  Auto Lymphocyte # : x  Auto Monocyte # : x  Auto Eosinophil # : x  Auto Basophil # : x  Auto Neutrophil % : 44.7 %  Auto Lymphocyte % : 40.3 %  Auto Monocyte % : 13.2 %  Auto Eosinophil % : 1.4 %  Auto Basophil % : 0.4 %    CBC Full  -  ( 15 Jul 2017 17:54 )  WBC Count : 4.8 K/uL  Hemoglobin : 14.1 g/dL  Hematocrit : 41.7 %  Platelet Count - Automated : 274 K/uL  Mean Cell Volume : 92.3 fL  Mean Cell Hemoglobin : 31.2 pg  Mean Cell Hemoglobin Concentration : 33.8 g/dL  Auto Neutrophil # : x  Auto Lymphocyte # : x  Auto Monocyte # : x  Auto Eosinophil # : x  Auto Basophil # : x  Auto Neutrophil % : 44.7 %  Auto Lymphocyte % : 40.3 %  Auto Monocyte % : 13.2 %  Auto Eosinophil % : 1.4 %  Auto Basophil % : 0.4 %    Urinalysis Basic - ( 15 Jul 2017 19:29 )    Color: Yellow / Appearance: x / S.025 / pH: x  Gluc: x / Ketone: NEGATIVE  / Bili: NEGATIVE / Urobili: 0.2 E.U./dL   Blood: x / Protein: Trace mg/dL / Nitrite: NEGATIVE   Leuk Esterase: NEGATIVE / RBC: < 5 /HPF / WBC 5-10 /HPF   Sq Epi: x / Non Sq Epi: Moderate /HPF / Bacteria: Present /HPF
CBC Full  -  ( 15 Jul 2017 17:54 )  WBC Count : 4.8 K/uL  Hemoglobin : 14.1 g/dL  Hematocrit : 41.7 %  Platelet Count - Automated : 274 K/uL  Mean Cell Volume : 92.3 fL  Mean Cell Hemoglobin : 31.2 pg  Mean Cell Hemoglobin Concentration : 33.8 g/dL  Auto Neutrophil # : x  Auto Lymphocyte # : x  Auto Monocyte # : x  Auto Eosinophil # : x  Auto Basophil # : x  Auto Neutrophil % : 44.7 %  Auto Lymphocyte % : 40.3 %  Auto Monocyte % : 13.2 %  Auto Eosinophil % : 1.4 %  Auto Basophil % : 0.4 %    CBC Full  -  ( 15 Jul 2017 17:54 )  WBC Count : 4.8 K/uL  Hemoglobin : 14.1 g/dL  Hematocrit : 41.7 %  Platelet Count - Automated : 274 K/uL  Mean Cell Volume : 92.3 fL  Mean Cell Hemoglobin : 31.2 pg  Mean Cell Hemoglobin Concentration : 33.8 g/dL  Auto Neutrophil # : x  Auto Lymphocyte # : x  Auto Monocyte # : x  Auto Eosinophil # : x  Auto Basophil # : x  Auto Neutrophil % : 44.7 %  Auto Lymphocyte % : 40.3 %  Auto Monocyte % : 13.2 %  Auto Eosinophil % : 1.4 %  Auto Basophil % : 0.4 %    Urinalysis Basic - ( 15 Jul 2017 19:29 )    Color: Yellow / Appearance: x / S.025 / pH: x  Gluc: x / Ketone: NEGATIVE  / Bili: NEGATIVE / Urobili: 0.2 E.U./dL   Blood: x / Protein: Trace mg/dL / Nitrite: NEGATIVE   Leuk Esterase: NEGATIVE / RBC: < 5 /HPF / WBC 5-10 /HPF   Sq Epi: x / Non Sq Epi: Moderate /HPF / Bacteria: Present /HPF
vpa trough level - 90  CBC Full  -  ( 01 Aug 2017 06:23 )  WBC Count : 9.3 K/uL  Hemoglobin : 12.9 g/dL  Hematocrit : 39.4 %  Platelet Count - Automated : 334 K/uL  Mean Cell Volume : 94.5 fL  Mean Cell Hemoglobin : 30.9 pg  Mean Cell Hemoglobin Concentration : 32.7 g/dL    LIVER FUNCTIONS - ( 01 Aug 2017 06:23 )  Alb: 3.7 g/dL / Pro: 7.2 g/dL / ALK PHOS: 93 U/L / ALT: 18 U/L / AST: 22 U/L / GGT: x
VPA today = 34

## 2017-08-01 NOTE — PROGRESS NOTE BEHAVIORAL HEALTH - NSBHFUPINTERVALHXFT_PSY_A_CORE
Patient is a 61 yo SAAF lives alone, on disability, h/o bipolar disorder, known to undersigned from prior inpatient psychiatric hospitalization at [facility], h/o alcohol and cocaine use, BIB daughter with worsening depression and inability to care for herself in the context of recent loss of her godson and medication non-compliance.  Pt has had decreased appetite, with consequential weight loss, insomnia, low energy, and decreased concentration, along with paranoid ideation re: neighbors and family.  Last week, patient presented with elevated mood and increase in paranoia.  Therefore, VPA was optimized, mirtazapine discontinued, and olanzapine started.    Today, patient continues to have increased energy, with focus on food and paying her bills.  Patient remains pleasant and in behavioral control.  She endorses that thoughts/messages were being sent to her earlier in the week but that is no longer happening.  There seems to be ambivalence about who  was out to get her at this time.  She reports good appetite.  She has been seen eating and drinking her Ensure. Feels safe on the unit.  Visible and attends select groups.  She presents as a thin, emaciated women.   She denies SI/HI/AH/VH.  She does not appear to be responding to internal stimuli.  + paranoid delusions.  Elevated affect, mildly pressured speech. limited insight into illness.    Labs/Ekg reviewed. VPA trough level – 90 (on 250mg qam/500mg qhs).  CBC (to monitor for thrombocytopenia) and CMP (to montior for hepatotoxicity) WNL.   DDX- BAD, MRE mixed     Will continue VPA 250mg qam/500mg qhs given patient’s therapeutic VPA level and gradual improvement. Cont olanzapine 2.5mg qhs. Will continue increasing meds (ie olanzapine) slowly given patient's small body stature/age.  Psychoeducation provided re: importance of treatment compliance.  Pt expressed understanding and accepted am medications.  Encourage po intake/hydration.  Patient gives verbal consent to speak with her family.  Appreciate collateral from daughter.  Team to follow up with patient's mother as well. Cont tx as above for SLE.  I/G/M tx.  Discharge planning in place.

## 2017-08-02 PROCEDURE — 99232 SBSQ HOSP IP/OBS MODERATE 35: CPT

## 2017-08-02 RX ORDER — OLANZAPINE 15 MG/1
5 TABLET, FILM COATED ORAL AT BEDTIME
Qty: 0 | Refills: 0 | Status: DISCONTINUED | OUTPATIENT
Start: 2017-08-02 | End: 2017-08-09

## 2017-08-02 RX ORDER — HALOPERIDOL DECANOATE 100 MG/ML
2 INJECTION INTRAMUSCULAR EVERY 6 HOURS
Qty: 0 | Refills: 0 | Status: DISCONTINUED | OUTPATIENT
Start: 2017-08-02 | End: 2017-08-09

## 2017-08-02 RX ADMIN — OLANZAPINE 5 MILLIGRAM(S): 15 TABLET, FILM COATED ORAL at 21:26

## 2017-08-02 RX ADMIN — Medication 1 APPLICATION(S): at 21:27

## 2017-08-02 RX ADMIN — Medication 200 MILLIGRAM(S): at 07:36

## 2017-08-02 RX ADMIN — Medication 5 MILLIGRAM(S): at 21:26

## 2017-08-02 RX ADMIN — Medication 5 MILLIGRAM(S): at 09:15

## 2017-08-02 RX ADMIN — Medication 200 MILLIGRAM(S): at 16:07

## 2017-08-02 RX ADMIN — Medication 1 APPLICATION(S): at 09:15

## 2017-08-02 RX ADMIN — DIVALPROEX SODIUM 250 MILLIGRAM(S): 500 TABLET, DELAYED RELEASE ORAL at 09:15

## 2017-08-02 RX ADMIN — DIVALPROEX SODIUM 500 MILLIGRAM(S): 500 TABLET, DELAYED RELEASE ORAL at 21:26

## 2017-08-02 RX ADMIN — Medication 1 MILLIGRAM(S): at 09:15

## 2017-08-02 NOTE — PROGRESS NOTE BEHAVIORAL HEALTH - SUMMARY
63 yo AA  female with reported diagnosis of bipolar II d/o, 1 prior admission on [facility], hx of alcohol use bib daughter w/ concerns of depressive symptoms and paranoid ideation that has worsened over last few weeks, aggravated by passing of godson and medication noncompliance. Pt initially presented with depressive symptoms and expressed vague paranoid ideation. Daughter corroborates pt's self-report of mental state. She is not suicidal/homicidal w/no hx of past attempts.  Required IP admission as she is unable to care for herself due to the severity of her depression.  She is presently not showing si/reporting sy of ETOH withdrawal. Initially, resumed on mood stabilizer and mirtazapine, monitored closely for mood improvement and ETOH withdrawal and resumed OP meds for SLE. On 7/25, Mirtazapine was discontinued and Zyprexa was started at 2.5mg due to concerns of hypomania/manic symptoms.  On 7/26, Depakote was increased from 250mg PO BID to 250mg AM + 500mg PM. Pt continues to have an elated mood and mildly pressured speech though her sleep/appetite are well preserved.  Patient continues to tolerate medication regimen well with no reported or observed side effects or adverse reactions.Her mood first became elated ; but soon she started becoming intermittently irritable and labile.  Plan is to continue titration of depakote (increased to 500mg BID on 7/31) and Zyprexa (increased to 5mg on 8/2)  as needed in order to achieve improved mood stability.

## 2017-08-02 NOTE — PROGRESS NOTE BEHAVIORAL HEALTH - OTHER
emaciated, poor dentition Mildly pressured at times intermittently irritable; depressed at other times

## 2017-08-02 NOTE — PROGRESS NOTE BEHAVIORAL HEALTH - NSBHCHARTREVIEWVS_PSY_A_CORE FT
Vital Signs Last 24 Hrs  T(C): 37 (02 Aug 2017 09:00), Max: 37 (02 Aug 2017 09:00)  T(F): 98.6 (02 Aug 2017 09:00), Max: 98.6 (02 Aug 2017 09:00)  HR: 94 (02 Aug 2017 09:00) (93 - 94)  BP: 122/72 (02 Aug 2017 09:00) (122/72 - 126/74)  BP(mean): --  RR: 18 (02 Aug 2017 09:00) (18 - 18)  SpO2: --

## 2017-08-02 NOTE — PROGRESS NOTE BEHAVIORAL HEALTH - CASE SUMMARY
Patient is a 61 yo SAAF lives alone, on disability, h/o bipolar disorder, known to undersigned from prior inpatient psychiatric hospitalization at [facility], h/o alcohol and cocaine use, BIB daughter with worsening depression and inability to care for herself in the context of recent loss of her godson and medication non-compliance.  Pt has had decreased appetite, with consequential weight loss, insomnia, low energy, and decreased concentration, along with paranoid ideation re: neighbors and family.  Last week, patient presented with elevated mood and increase in paranoia.  Therefore, VPA was optimized, mirtazapine discontinued, and olanzapine initiated.    Today, patient is easily irritable in the morning with labile affect.  She is able to be verbally redirected.  She reports feeling “depressed” because of the hospital environment and not getting peanut butter and jelly sandwiches as regularly as she would like. She reports good appetite.  She has been seen eating and drinking her Ensure.  Patient is pleasant and in behavioral control.  She denies transmission of any messages.  Feels safe on the unit.  Visible and attends various groups. She is compliant with medication and denies medication side effects. She presents as a thin, emaciated women.   She denies SI/HI/AH/VH.  She does not appear to be responding to internal stimuli.  No overt delusions.  “depressed” mood, euthymic affect, mildly pressured speech. Improving insight into illness.    Labs/Ekg reviewed. VPA trough level – 90 (on 250mg qam/500mg qhs).  CBC (to monitor for thrombocytopenia) and CMP (to montior for hepatotoxicity) WNL.   DDX- BAD, MRE mixed    Will increase olanzapine to 5mg qhs to better address the irritability, elevated mood and intermittent paranoia. This should also assist with intermittent sleep difficulties.  Change to disintegrating tablets (Zydis) to ensure compliance.  Will continue increasing meds (ie olanzapine) slowly given patient's small body stature/age.  Will continue VPA 250mg qam/500mg qhs given patient’s therapeutic VPA level and gradual mood stabilization. Psychoeducation provided re: importance of treatment compliance upon discharge.  Pt expressed understanding and accepted am medications.  Encourage po intake/hydration.  Patient gives verbal consent to speak with family.  Appreciate collateral info from family.  Cont tx as above for SLE.  I/G/M tx.  Discharge planning in place.

## 2017-08-02 NOTE — PROGRESS NOTE BEHAVIORAL HEALTH - NSBHFUPINTERVALHXFT_PSY_A_CORE
During first eval (writer) today morning; pt was irritable, discharge-focussed, became mildly agitated while discussing her treatment plan but was redirected with ease, became tearful while discussing her financial situation, complained about not being treated well by people (no specifics offered) and stated that she will be putting in a 72h letter.Denied SI/HI/AH/VH; no delusions verbalized; continues to be compliant with standing meds; no side effects noted/observed.     During a second eval with Attending and nurse present (1h later), pt. was much calmer, reported feeling depressed, did not become agitated; was initially DC focussed but did not put in a 72h letter.Attending  addressed her concerns about treatment and diet.Reported that her sleep is intermittently disturbed; appetite is well preserved.Initially spoke of people being rude to her ("animals") but quickly added that there were also people who treated her well ("angels").  She became somewhat pleasant towards the end and agreed to continue her treatment until her mood is more stable. During first eval (writer) today morning; pt was irritable, discharge-focussed, became mildly agitated while discussing her treatment plan but was redirected with ease, became tearful while discussing her financial situation, complained about not being treated well by people (no specifics offered) and stated that she will be putting in a 72h letter.Denied SI/HI/AH/VH; no delusions verbalized; continues to be compliant with standing meds; no side effects noted/observed.   During a second eval with Attending and nurse present (1h later), pt. was much calmer, reported feeling depressed, did not become agitated; was initially DC focussed but did not put in a 72h letter.Attending  addressed her concerns about treatment and diet.Reported that her sleep is intermittently disturbed; appetite is well preserved.Initially spoke of people being rude to her ("animals") but quickly added that there were also people who treated her well ("angels").  She became somewhat pleasant towards the end and agreed to continue her treatment until her mood is more stable. During first eval (writer) today morning; pt was irritable, discharge-focussed, became mildly agitated while discussing her treatment plan but was redirected with ease, became tearful while discussing her financial situation, complained about not being treated well by people (no specifics offered) and stated that she planned to put in a 72h letter. Denied SI/HI/AH/VH; no delusions verbalized; continues to be compliant with standing meds; no side effects noted/observed.   During a second eval with Attending and nurse present (1h later), pt. was much calmer, reported feeling depressed, did not become agitated; was initially DC focussed but did not put in a 72h letter.Attending  addressed her concerns about treatment and diet.Reported that her sleep is intermittently disturbed; appetite is well preserved.Initially spoke of people being rude to her ("animals") but quickly added that there were also people who treated her well ("angels").  She became somewhat pleasant towards the end and agreed to continue her treatment until her mood is more stable.

## 2017-08-02 NOTE — PROGRESS NOTE BEHAVIORAL HEALTH - PROBLEM SELECTOR PLAN 1
VPA level (trough) on 8/1 AM :   c/w Depakote 250mg AM 500mg PM  increase Zyprexa to 5mg QHS VPA level (trough) on 8/1 AM : 90  c/w Depakote 250mg AM 500mg PM  increase Zyprexa to 5mg QHS

## 2017-08-03 PROCEDURE — 99232 SBSQ HOSP IP/OBS MODERATE 35: CPT

## 2017-08-03 RX ADMIN — DIVALPROEX SODIUM 250 MILLIGRAM(S): 500 TABLET, DELAYED RELEASE ORAL at 10:14

## 2017-08-03 RX ADMIN — Medication 200 MILLIGRAM(S): at 07:45

## 2017-08-03 RX ADMIN — Medication 1 APPLICATION(S): at 21:42

## 2017-08-03 RX ADMIN — Medication 5 MILLIGRAM(S): at 10:15

## 2017-08-03 RX ADMIN — Medication 5 MILLIGRAM(S): at 21:42

## 2017-08-03 RX ADMIN — DIVALPROEX SODIUM 500 MILLIGRAM(S): 500 TABLET, DELAYED RELEASE ORAL at 21:42

## 2017-08-03 RX ADMIN — Medication 1 APPLICATION(S): at 10:15

## 2017-08-03 RX ADMIN — Medication 200 MILLIGRAM(S): at 18:07

## 2017-08-03 RX ADMIN — Medication 1 MILLIGRAM(S): at 10:15

## 2017-08-03 RX ADMIN — OLANZAPINE 5 MILLIGRAM(S): 15 TABLET, FILM COATED ORAL at 21:42

## 2017-08-03 NOTE — PROGRESS NOTE BEHAVIORAL HEALTH - OTHER
emaciated, poor dentition Mildly pressured at times intermittently irritable; depressed at other times Congruent Less depressed; less anxious; less irritable

## 2017-08-03 NOTE — PROGRESS NOTE BEHAVIORAL HEALTH - NSBHFUPINTERVALHXFT_PSY_A_CORE
During first eval (writer) today morning; pt was irritable, discharge-focussed, became mildly agitated while discussing her treatment plan but was redirected with ease, became tearful while discussing her financial situation, complained about not being treated well by people (no specifics offered) and stated that she planned to put in a 72h letter. Denied SI/HI/AH/VH; no delusions verbalized; continues to be compliant with standing meds; no side effects noted/observed.   During a second eval with Attending and nurse present (1h later), pt. was much calmer, reported feeling depressed, did not become agitated; was initially DC focussed but did not put in a 72h letter.Attending  addressed her concerns about treatment and diet.Reported that her sleep is intermittently disturbed; appetite is well preserved.Initially spoke of people being rude to her ("animals") but quickly added that there were also people who treated her well ("angels").  She became somewhat pleasant towards the end and agreed to continue her treatment until her mood is more stable. Evaluated in pt's room; more pleasant/cooperative; proudly showed writer all the necklaces she'd made/received : she'd arranged them on her table neatly with artwork, to form an impressive display. Related details of writer's conversation w/daughter yesterday. Reported some improvement in mood; sleep was undirturbed yesterday night; appetite well preserved; no episodes of irritability noted by nurses/reported by patient. Apologized for her behavior yesterday morning. Did not appear to be excited/hypomanic; denied racing thoughts. Denied SI/HI/AH/VH; no delusions verbalized; continues to be compliant with standing meds; no side effects noted/observed. Evaluated in pt's room; more pleasant/cooperative; proudly showed writer all the necklaces she'd made/received : she'd arranged them on her table neatly with artwork, to form an impressive display. Related details of writer's conversation w/daughter yesterday. Reported some improvement in mood; sleep was undisturbed yesterday night; appetite well preserved; no episodes of irritability noted by nurses/reported by patient. Apologized for her behavior yesterday morning. Did not appear to be excited/hypomanic; denied racing thoughts. Denied SI/HI/AH/VH; no delusions verbalized; continues to be compliant with standing meds; no side effects noted/observed.

## 2017-08-03 NOTE — PROGRESS NOTE BEHAVIORAL HEALTH - RISK ASSESSMENT
Static risk:  prior admission, depressive episode, alcohol use, age, domiciled alone, age.  Modifiable factors:  depressed mood, recent non compliance with medications, paranoid ideation  Protective factor:  supportive family, patient denies SI/HI/AH/VH, and is future forward.  formulation : considering lack of past attempts and the fact that she presently denies SI (plus daughter's corroboration); pt. is currently at low risk of self-harm. Static risk:  prior admission, depressive episode, alcohol use, age, domiciled alone, age.  Modifiable factors:  depressed mood (improved mood), recent non compliance with medications (currently treatment compliant with a therapeutic vpa level), paranoid ideation (no further evidence of psychosis/edwardo)  Protective factor:  supportive family, patient denies SI/HI/AH/VH, and is future forward.  Risk Formulation : considering lack of past attempts and the fact that she presently denies SI (plus daughter's corroboration); pt. is currently at low risk of harm to self or others.

## 2017-08-03 NOTE — PROGRESS NOTE BEHAVIORAL HEALTH - PROBLEM SELECTOR PLAN 1
VPA level (trough) on 8/1 AM : 90  c/w Depakote 250mg AM 500mg PM  increase Zyprexa to 5mg QHS c/w Depakote 250mg AM 500mg PM  c/w Zyprexa to 5mg QHS  VPA level (trough) on 8/1 AM : 90

## 2017-08-03 NOTE — PROGRESS NOTE BEHAVIORAL HEALTH - NSBHCHARTREVIEWVS_PSY_A_CORE FT
Vital Signs Last 24 Hrs  T(C): 37 (02 Aug 2017 09:00), Max: 37 (02 Aug 2017 09:00)  T(F): 98.6 (02 Aug 2017 09:00), Max: 98.6 (02 Aug 2017 09:00)  HR: 94 (02 Aug 2017 09:00) (93 - 94)  BP: 122/72 (02 Aug 2017 09:00) (122/72 - 126/74)  BP(mean): --  RR: 18 (02 Aug 2017 09:00) (18 - 18)  SpO2: -- Vital Signs Last 24 Hrs  T(C): 36.6 (03 Aug 2017 09:43), Max: 36.6 (02 Aug 2017 17:00)  T(F): 97.8 (03 Aug 2017 09:43), Max: 97.9 (02 Aug 2017 17:00)  HR: 73 (03 Aug 2017 09:43) (73 - 96)  BP: 110/70 (03 Aug 2017 09:43) (110/63 - 110/70)  BP(mean): --  RR: 17 (03 Aug 2017 09:43) (16 - 17)  SpO2: 99% (02 Aug 2017 17:00) (99% - 99%)

## 2017-08-03 NOTE — PROGRESS NOTE BEHAVIORAL HEALTH - CASE SUMMARY
Patient is a 61 yo SAAF lives alone, on disability, h/o bipolar disorder, known to undersigned from prior inpatient psychiatric hospitalization at [facility], h/o alcohol and cocaine use, BIB daughter with worsening depression and inability to care for herself in the context of recent loss of her godson and medication non-compliance.  Pt has had decreased appetite, with consequential weight loss, insomnia, low energy, and decreased concentration, along with paranoid ideation re: neighbors and family.  Last week, patient presented with elevated mood and increase in paranoia.  Therefore, VPA was optimized, mirtazapine discontinued, and olanzapine initiated.    Today, patient is calm with euthymic affect today.  She tolerated the increase in olanzapine last night and slept well last night.  She reports improved mood and looks forward to discharge soon.  She reports good appetite and is seen eating appropriately.  Patient is pleasant and in behavioral control.  She denies paranoia/psychosis.  Feels safe on the unit.  Visible and attends various groups. She is compliant with medication and denies medication side effects. She presents as a thin women.   She denies SI/HI/AH/VH.  She does not appear to be responding to internal stimuli.  No overt delusions.  “good” mood, euthymic affect, RRR. Improving insight into illness.    Labs/Ekg reviewed. VPA trough level – 90 (on 250mg qam/500mg qhs).  CBC (to monitor for thrombocytopenia) and CMP (to monitor for hepatotoxicity) WNL.   DDX- BAD, MRE mixed    Will continue olanzapine disintegrating tablets (Zyprexa Zydis) to 5mg qhs for mood lability associated with BAD and intermittent sleep difficulties.  Slow titrations given patient's small body stature/age.  Will continue VPA 250mg qam/500mg qhs given patient’s therapeutic VPA level and gradual mood stabilization. Psychoeducation provided re: importance of treatment compliance upon discharge.  Pt expressed understanding and accepted am medications.  Encourage po intake/hydration.  Patient gives verbal consent to speak with family.  Appreciate collateral info from family.  Cont tx as above for SLE.  I/G/M tx.  Discharge planning in place, likely discharge tomorrow.

## 2017-08-04 PROCEDURE — 99232 SBSQ HOSP IP/OBS MODERATE 35: CPT

## 2017-08-04 RX ADMIN — Medication 1 MILLIGRAM(S): at 09:47

## 2017-08-04 RX ADMIN — Medication 5 MILLIGRAM(S): at 09:48

## 2017-08-04 RX ADMIN — METHOTREXATE 10 MILLIGRAM(S): 2.5 TABLET ORAL at 10:14

## 2017-08-04 RX ADMIN — Medication 1 APPLICATION(S): at 22:01

## 2017-08-04 RX ADMIN — Medication 5 MILLIGRAM(S): at 22:01

## 2017-08-04 RX ADMIN — DIVALPROEX SODIUM 250 MILLIGRAM(S): 500 TABLET, DELAYED RELEASE ORAL at 09:48

## 2017-08-04 RX ADMIN — OLANZAPINE 5 MILLIGRAM(S): 15 TABLET, FILM COATED ORAL at 22:01

## 2017-08-04 RX ADMIN — Medication 200 MILLIGRAM(S): at 07:40

## 2017-08-04 RX ADMIN — DIVALPROEX SODIUM 500 MILLIGRAM(S): 500 TABLET, DELAYED RELEASE ORAL at 22:01

## 2017-08-04 RX ADMIN — Medication 1 APPLICATION(S): at 09:48

## 2017-08-04 NOTE — PROGRESS NOTE BEHAVIORAL HEALTH - OTHER
emaciated, poor dentition Less depressed; less anxious; less irritable Congruent Tends to become a little fixated on subjects related to nutrition/weight/body image; still mildly tangential Improvement in mood; not irritable

## 2017-08-04 NOTE — PROGRESS NOTE BEHAVIORAL HEALTH - NSBHCHARTREVIEWVS_PSY_A_CORE FT
Vital Signs Last 24 Hrs  T(C): 36.6 (03 Aug 2017 09:43), Max: 36.6 (02 Aug 2017 17:00)  T(F): 97.8 (03 Aug 2017 09:43), Max: 97.9 (02 Aug 2017 17:00)  HR: 73 (03 Aug 2017 09:43) (73 - 96)  BP: 110/70 (03 Aug 2017 09:43) (110/63 - 110/70)  BP(mean): --  RR: 17 (03 Aug 2017 09:43) (16 - 17)  SpO2: 99% (02 Aug 2017 17:00) (99% - 99%) Vital Signs Last 24 Hrs  T(C): 36.4 (04 Aug 2017 10:00), Max: 36.7 (03 Aug 2017 17:00)  T(F): 97.6 (04 Aug 2017 10:00), Max: 98.1 (03 Aug 2017 17:00)  HR: 105 (04 Aug 2017 10:00) (105 - 116)  BP: 118/74 (04 Aug 2017 10:00) (118/74 - 137/79)  BP(mean): --  RR: 18 (04 Aug 2017 10:00) (18 - 20)  SpO2: --

## 2017-08-04 NOTE — PROGRESS NOTE BEHAVIORAL HEALTH - NSBHFUPINTERVALHXFT_PSY_A_CORE
Evaluated in pt's room; more pleasant/cooperative; proudly showed writer all the necklaces she'd made/received : she'd arranged them on her table neatly with artwork, to form an impressive display. Related details of writer's conversation w/daughter yesterday. Reported some improvement in mood; sleep was undisturbed yesterday night; appetite well preserved; no episodes of irritability noted by nurses/reported by patient. Apologized for her behavior yesterday morning. Did not appear to be excited/hypomanic; denied racing thoughts. Denied SI/HI/AH/VH; no delusions verbalized; continues to be compliant with standing meds; no side effects noted/observed. Evaluated in the morning post-breakfast; pleasant and forthcoming; improved frustration tolerance; reported having slept well overnight; appetite well preserved; mood is notably better (did not become irritable/impatient during eval); still somewhat tangential with a tendency to become fixated on subjects (eg-food/weight/body image/Vit D supplementation) : expressed concern about becoming obese. Writer reassured pt that she is not overweight/encouraged her to continue having regular meals; informed her that she is already on Vit D supplementation (see Plan). Denied SI/HI/AH/VH; no delusions verbalized; compliant w/standing meds; no side-effects noted/observed.  Discussed SW's conversation w/her daughter yesterday. Pt. appreciates plan for discharge on Monday if she is clinically stable over weekend.

## 2017-08-04 NOTE — PROGRESS NOTE BEHAVIORAL HEALTH - RISK ASSESSMENT
Static risk:  prior admission, depressive episode, alcohol use, age, domiciled alone, age.  Modifiable factors:  depressed mood (improved mood), recent non compliance with medications (currently treatment compliant with a therapeutic vpa level), paranoid ideation (no further evidence of psychosis/edwardo)  Protective factor:  supportive family, patient denies SI/HI/AH/VH, and is future forward.  Risk Formulation : considering lack of past attempts and the fact that she presently denies SI (plus daughter's corroboration); pt. is currently at low risk of harm to self or others.

## 2017-08-04 NOTE — PROGRESS NOTE BEHAVIORAL HEALTH - CASE SUMMARY
Patient is a 61 yo SAAF lives alone, on disability, h/o bipolar disorder, known to undersigned from prior inpatient psychiatric hospitalization at [facility], h/o alcohol and cocaine use, BIB daughter with worsening depression and inability to care for herself in the context of recent loss of her godson and medication non-compliance.  Pt has had decreased appetite, with consequential weight loss, insomnia, low energy, and decreased concentration, along with paranoid ideation re: neighbors and family.  Last week, patient presented with elevated mood and increase in paranoia.  Therefore, VPA was optimized, mirtazapine discontinued, and olanzapine initiated.    Today, patient is calm with euthymic affect today.  She tolerated the increase in olanzapine last night and slept well last night.  She reports improved mood and looks forward to discharge soon.  She reports good appetite and is seen eating appropriately.  Patient is pleasant and in behavioral control.  She denies paranoia/psychosis.  Feels safe on the unit.  Visible and attends various groups. She is compliant with medication and denies medication side effects. She presents as a thin women.   She denies SI/HI/AH/VH.  She does not appear to be responding to internal stimuli.  No overt delusions.  “good” mood, euthymic affect, RRR. Improving insight into illness.    Labs/Ekg reviewed. VPA trough level – 90 (on 250mg qam/500mg qhs).  CBC (to monitor for thrombocytopenia) and CMP (to monitor for hepatotoxicity) WNL.   DDX- BAD, MRE mixed    Will continue olanzapine disintegrating tablets (Zyprexa Zydis) to 5mg qhs for mood lability associated with BAD and intermittent sleep difficulties.  Slow titrations given patient's small body stature/age.  Will continue VPA 250mg qam/500mg qhs given patient’s therapeutic VPA level and gradual mood stabilization. Psychoeducation provided re: importance of treatment compliance upon discharge.  Pt expressed understanding and accepted am medications.  Encourage po intake/hydration.  Patient gives verbal consent to speak with family.  Appreciate collateral info from family.  Cont tx as above for SLE.  I/G/M tx.  Discharge planning in place, likely discharge tomorrow. Patient is a 61 yo SAAF lives alone, on disability, h/o bipolar disorder, known to undersigned from prior inpatient psychiatric hospitalization at [facility], h/o alcohol and cocaine use, BIB daughter with worsening depression and inability to care for herself in the context of recent loss of her godson and medication non-compliance.  Pt has had decreased appetite, with consequential weight loss, insomnia, low energy, and decreased concentration, along with paranoid ideation re: neighbors and family.  Patient then presented with elevated mood and increase in paranoia, therefore, VPA was optimized, mirtazapine discontinued, and olanzapine initiated.    Today, patient is calm with euthymic affect today.  She slept well last night.  She reports improved mood and looks forward to discharge soon.  She reports good appetite and is seen eating appropriately.  Patient is pleasant and in behavioral control.  She denies paranoia/psychosis.  Feels safe on the unit.  Visible and attends various groups. She is compliant with medication and denies medication side effects. She presents as a thin women.   She denies SI/HI/AH/VH.  She does not appear to be responding to internal stimuli.  No overt delusions.  “good” mood, euthymic affect, RRR. Improving insight into illness.    Labs/Ekg reviewed. VPA trough level – 90 (on 250mg qam/500mg qhs).  CBC (to monitor for thrombocytopenia) and CMP (to monitor for hepatotoxicity) WNL.   DDX- BAD, MRE mixed    Will continue olanzapine disintegrating tablets (Zyprexa Zydis) to 5mg qhs for mood lability associated with BAD and intermittent sleep difficulties.  Slow titrations given patient's small body stature/age.  Will continue VPA 250mg qam/500mg qhs given patient’s therapeutic VPA level and gradual mood stabilization. Psychoeducation provided re: importance of treatment compliance upon discharge.  Pt expressed understanding and accepted am medications.  Encourage po intake/hydration.  Patient gives verbal consent to speak with family.  Appreciate collateral info from family.  Cont tx as above for SLE.  I/G/M tx.  Discharge planning in place, likely discharge tomorrow.  Overall, patient's modifiable risk factors have been addressed and patient is currently at low acute risk of harm to self/others at this time.  Patient will likely be discharged on monday.

## 2017-08-04 NOTE — PROGRESS NOTE BEHAVIORAL HEALTH - SUMMARY
61 yo AA  female with reported diagnosis of bipolar II d/o, 1 prior admission on [facility], hx of alcohol use bib daughter w/ concerns of depressive symptoms and paranoid ideation that has worsened over last few weeks, aggravated by passing of godson and medication noncompliance. Pt initially presented with depressive symptoms and expressed vague paranoid ideation. Daughter corroborates pt's self-report of mental state. She is not suicidal/homicidal w/no hx of past attempts.  Required IP admission as she is unable to care for herself due to the severity of her depression.  She is presently not showing si/reporting sy of ETOH withdrawal. Initially, resumed on mood stabilizer and mirtazapine, monitored closely for mood improvement and ETOH withdrawal and resumed OP meds for SLE. On 7/25, Mirtazapine was discontinued and Zyprexa was started at 2.5mg due to concerns of hypomania/manic symptoms.  On 7/26, Depakote was increased from 250mg PO BID to 250mg AM + 500mg PM. Pt continues to have an elated mood and mildly pressured speech though her sleep/appetite are well preserved.  Patient continues to tolerate medication regimen well with no reported or observed side effects or adverse reactions.Her mood first became elated ; but soon she started becoming intermittently irritable and labile.  Plan is to continue titration of depakote (increased to 500mg BID on 7/31) and Zyprexa (increased to 5mg on 8/2)  as needed in order to achieve improved mood stability. 63 yo AA  female with reported diagnosis of bipolar II d/o, 1 prior admission on [facility], hx of alcohol use bib daughter w/ concerns of depressive symptoms and paranoid ideation that has worsened over last few weeks, aggravated by passing of godson and medication noncompliance. Pt initially presented with depressive symptoms and expressed vague paranoid ideation. Daughter corroborates pt's self-report of mental state. She is not suicidal/homicidal w/no hx of past attempts.  Required IP admission as she is unable to care for herself due to the severity of her depression.  She is presently not showing si/reporting sy of ETOH withdrawal. Initially, resumed on mood stabilizer and mirtazapine, monitored closely for mood improvement and ETOH withdrawal and resumed OP meds for SLE. On 7/25, Mirtazapine was discontinued and Zyprexa was started at 2.5mg due to concerns of hypomania/manic symptoms.  On 7/26, Depakote was increased from 250mg PO BID to 250mg AM + 500mg PM. Pt continues to have an elated mood and mildly pressured speech though her sleep/appetite are well preserved.  Patient continues to tolerate medication regimen well with no reported or observed side effects or adverse reactions.Her mood first became elated ; but soon she started becoming intermittently irritable and labile.  Depakote was increased to 500mg BID on 7/31 and Zyprexa was increased to 5mg on 8/2. Pt's mood has become more stable over last 2-4 days with fewer episodes of irritability. As of 8/4, her mood has become more stable; AODLs good; plan is to discharge on Monday if clinically stable and compliant w/meds over weekend.

## 2017-08-05 RX ORDER — METHOTREXATE 2.5 MG/1
10 TABLET ORAL ONCE
Qty: 0 | Refills: 0 | Status: COMPLETED | OUTPATIENT
Start: 2017-08-05 | End: 2017-08-05

## 2017-08-05 RX ORDER — METHOTREXATE 2.5 MG/1
10 TABLET ORAL
Qty: 0 | Refills: 0 | Status: DISCONTINUED | OUTPATIENT
Start: 2017-08-05 | End: 2017-08-05

## 2017-08-05 RX ADMIN — Medication 5 MILLIGRAM(S): at 09:23

## 2017-08-05 RX ADMIN — Medication 200 MILLIGRAM(S): at 19:51

## 2017-08-05 RX ADMIN — Medication 5 MILLIGRAM(S): at 21:32

## 2017-08-05 RX ADMIN — DIVALPROEX SODIUM 500 MILLIGRAM(S): 500 TABLET, DELAYED RELEASE ORAL at 21:32

## 2017-08-05 RX ADMIN — Medication 1 APPLICATION(S): at 09:23

## 2017-08-05 RX ADMIN — DIVALPROEX SODIUM 250 MILLIGRAM(S): 500 TABLET, DELAYED RELEASE ORAL at 09:23

## 2017-08-05 RX ADMIN — Medication 1 MILLIGRAM(S): at 09:23

## 2017-08-05 RX ADMIN — Medication 1 APPLICATION(S): at 21:36

## 2017-08-05 RX ADMIN — METHOTREXATE 10 MILLIGRAM(S): 2.5 TABLET ORAL at 21:31

## 2017-08-05 RX ADMIN — OLANZAPINE 5 MILLIGRAM(S): 15 TABLET, FILM COATED ORAL at 21:32

## 2017-08-05 RX ADMIN — Medication 200 MILLIGRAM(S): at 07:42

## 2017-08-05 NOTE — PROGRESS NOTE BEHAVIORAL HEALTH - NSBHFUPINTERVALHXFT_PSY_A_CORE
Brief Progress note:    Received a call from nursing that the patient's methotrexate 10mg was not given this morning as scheduled. I spoke with the pharmacist who confirmed it was ok to give late, so ordered a one time dose of 10mg for this evening. Pharmacist confirmed they would send it up. Rest of note/MSE as per last psychiatrist's assessment and plan.

## 2017-08-05 NOTE — PROGRESS NOTE BEHAVIORAL HEALTH - OTHER
emaciated, poor dentition Improvement in mood; not irritable Congruent Tends to become a little fixated on subjects related to nutrition/weight/body image; still mildly tangential

## 2017-08-06 RX ADMIN — Medication 200 MILLIGRAM(S): at 16:36

## 2017-08-06 RX ADMIN — Medication 5 MILLIGRAM(S): at 21:33

## 2017-08-06 RX ADMIN — Medication 1 APPLICATION(S): at 10:18

## 2017-08-06 RX ADMIN — Medication 1 MILLIGRAM(S): at 10:18

## 2017-08-06 RX ADMIN — Medication 5 MILLIGRAM(S): at 10:18

## 2017-08-06 RX ADMIN — Medication 1 APPLICATION(S): at 21:35

## 2017-08-06 RX ADMIN — Medication 200 MILLIGRAM(S): at 06:53

## 2017-08-06 RX ADMIN — DIVALPROEX SODIUM 250 MILLIGRAM(S): 500 TABLET, DELAYED RELEASE ORAL at 10:18

## 2017-08-06 RX ADMIN — OLANZAPINE 5 MILLIGRAM(S): 15 TABLET, FILM COATED ORAL at 21:33

## 2017-08-06 RX ADMIN — DIVALPROEX SODIUM 500 MILLIGRAM(S): 500 TABLET, DELAYED RELEASE ORAL at 21:33

## 2017-08-07 PROCEDURE — 99232 SBSQ HOSP IP/OBS MODERATE 35: CPT

## 2017-08-07 PROCEDURE — 99238 HOSP IP/OBS DSCHRG MGMT 30/<: CPT

## 2017-08-07 RX ORDER — BACITRACIN ZINC 500 UNIT/G
1 OINTMENT IN PACKET (EA) TOPICAL
Qty: 1 | Refills: 0 | OUTPATIENT
Start: 2017-08-07 | End: 2017-08-14

## 2017-08-07 RX ORDER — ERGOCALCIFEROL 1.25 MG/1
1 CAPSULE ORAL
Qty: 4 | Refills: 0 | OUTPATIENT
Start: 2017-08-07 | End: 2017-09-04

## 2017-08-07 RX ORDER — DIVALPROEX SODIUM 500 MG/1
1 TABLET, DELAYED RELEASE ORAL
Qty: 14 | Refills: 0 | OUTPATIENT
Start: 2017-08-07 | End: 2017-08-21

## 2017-08-07 RX ORDER — FOLIC ACID 0.8 MG
1 TABLET ORAL
Qty: 30 | Refills: 0 | OUTPATIENT
Start: 2017-08-07 | End: 2017-09-06

## 2017-08-07 RX ORDER — METHOTREXATE 2.5 MG/1
4 TABLET ORAL
Qty: 0 | Refills: 0 | COMMUNITY
Start: 2017-08-07

## 2017-08-07 RX ORDER — HYDROXYCHLOROQUINE SULFATE 200 MG
1 TABLET ORAL
Qty: 0 | Refills: 0 | COMMUNITY
Start: 2017-08-07

## 2017-08-07 RX ORDER — OLANZAPINE 15 MG/1
1 TABLET, FILM COATED ORAL
Qty: 14 | Refills: 0 | OUTPATIENT
Start: 2017-08-07 | End: 2017-08-21

## 2017-08-07 RX ADMIN — Medication 5 MILLIGRAM(S): at 10:10

## 2017-08-07 RX ADMIN — Medication 5 MILLIGRAM(S): at 21:35

## 2017-08-07 RX ADMIN — OLANZAPINE 5 MILLIGRAM(S): 15 TABLET, FILM COATED ORAL at 21:35

## 2017-08-07 RX ADMIN — Medication 1 MILLIGRAM(S): at 10:11

## 2017-08-07 RX ADMIN — DIVALPROEX SODIUM 250 MILLIGRAM(S): 500 TABLET, DELAYED RELEASE ORAL at 10:10

## 2017-08-07 RX ADMIN — Medication 200 MILLIGRAM(S): at 18:12

## 2017-08-07 RX ADMIN — Medication 1 APPLICATION(S): at 10:10

## 2017-08-07 RX ADMIN — Medication 200 MILLIGRAM(S): at 07:02

## 2017-08-07 RX ADMIN — DIVALPROEX SODIUM 500 MILLIGRAM(S): 500 TABLET, DELAYED RELEASE ORAL at 21:36

## 2017-08-07 RX ADMIN — Medication 1 APPLICATION(S): at 21:36

## 2017-08-07 NOTE — PROGRESS NOTE BEHAVIORAL HEALTH - NSBHFUPINTERVALHXFT_PSY_A_CORE
Evaluated at bedside; reported having slept well overnight; appetite well preserved; mood is notably better; future-oriented. Denied SI/HI/AH/VH; no delusions verbalized; compliant w/standing meds; no side-effects noted/observed. Had a discussion w/her about discharge and aftercare. Pt. appreciates need to remain compliant w/Depakote and Zyprexa.Slated for DC later on today when family member comes to pick her up.

## 2017-08-07 NOTE — PROGRESS NOTE BEHAVIORAL HEALTH - SUMMARY
61 yo AA  female with reported diagnosis of bipolar II d/o, 1 prior admission on [facility], hx of alcohol use bib daughter w/ concerns of depressive symptoms and paranoid ideation that has worsened over last few weeks, aggravated by passing of godson and medication noncompliance. Pt initially presented with depressive symptoms and expressed vague paranoid ideation. Daughter corroborates pt's self-report of mental state. She is not suicidal/homicidal w/no hx of past attempts.  Required IP admission as she is unable to care for herself due to the severity of her depression.  She is presently not showing si/reporting sy of ETOH withdrawal. Initially, resumed on mood stabilizer and mirtazapine, monitored closely for mood improvement and ETOH withdrawal and resumed OP meds for SLE. On 7/25, Mirtazapine was discontinued and Zyprexa was started at 2.5mg due to concerns of hypomania/manic symptoms.  On 7/26, Depakote was increased from 250mg PO BID to 250mg AM + 500mg PM. Pt continues to have an elated mood and mildly pressured speech though her sleep/appetite are well preserved.  Patient continues to tolerate medication regimen well with no reported or observed side effects or adverse reactions.Her mood first became elated ; but soon she started becoming intermittently irritable and labile.  Depakote was increased to 500mg BID on 7/31 and Zyprexa was increased to 5mg on 8/2. Pt's mood has become more stable over last 2-4 days with fewer episodes of irritability. As of 8/7, her mood has become more stable; AODLs good; plan is to discharge today once family member arrives to pick her up

## 2017-08-07 NOTE — PROGRESS NOTE BEHAVIORAL HEALTH - OTHER
emaciated, poor dentition Improvement in mood; not irritable Congruent Tends to become a little fixated on subjects related to nutrition/weight/body image; still mildly tangential Still underweight but looks better/healthier on the whole

## 2017-08-07 NOTE — PROGRESS NOTE BEHAVIORAL HEALTH - CASE SUMMARY
Patient is a 61 yo SAAF lives alone, on disability, h/o bipolar disorder, known to undersigned from prior inpatient psychiatric hospitalization at [facility], h/o alcohol and cocaine use, BIB daughter with worsening depression and inability to care for herself in the context of recent loss of her godson and medication non-compliance.  Pt has had decreased appetite, with consequential weight loss, insomnia, low energy, and decreased concentration, along with paranoid ideation re: neighbors and family.  She was restarted on VPA and mirtazapine.  She developed elevated mood and increase in paranoia, therefore, VPA was optimized, mirtazapine discontinued, and olanzapine initiated with good effect.    Today, patient is currently calm, cooperative, pleasant and in behavioral control.  She is compliant with medications, tolerating meds without side effects.  She slept well last night.  She reports improved mood and looks forward to discharge today.  She reports good appetite and is seen eating appropriately with notable weight gain.  She denies paranoia/psychosis.  Feels safe and is future-oriented.  Visible and attends various groups. She presents as a thin women.   She denies SI/HI/AH/VH.  She does not appear to be responding to internal stimuli.  No overt delusions.  “good” mood, euthymic affect, RRR. Improving insight into illness.  AOx3.  Labs/Ekg reviewed. VPA trough level – 90 (on 250mg qam/500mg qhs).  CBC (to monitor for thrombocytopenia) and CMP (to monitor for hepatotoxicity) WNL.  DDX- BAD, MRE mixed  ;  Patient’s modifiable risk factors have been mitigated as patient has a stable mood and affect, denies SI/HI/AH/VH, improved appetite, treatment compliance and pt expresses understanding of risks associated with alcohol use and expresses desire to abstain.  She continues to have good family support, is future-oriented, with good insight into illness. Patient is currently at low acute risk of harm to self/others at this time.  Will continue olanzapine disintegrating tablets (Zyprexa Zydis) to 5mg qhs for mood lability associated with BAD and intermittent sleep difficulties.  Slow titrations given patient's small body stature/age.  Will continue VPA 250mg qam/500mg qhs given patient’s therapeutic VPA level and gradual mood stabilization. Psychoeducation provided re: importance of treatment compliance upon discharge.  Pt expressed understanding and accepted am medications.  Encourage po intake/hydration.  Psychoeducation provided re: risks of substance abuse, incl alcohol.  Patient expresses understanding and importance of abstinence.  Cont tx as above for SLE.  Discharge today.

## 2017-08-07 NOTE — PROGRESS NOTE BEHAVIORAL HEALTH - NSBHCHARTREVIEWVS_PSY_A_CORE FT
Vital Signs Last 24 Hrs  T(C): 36.6 (07 Aug 2017 09:53), Max: 36.6 (07 Aug 2017 09:53)  T(F): 97.9 (07 Aug 2017 09:53), Max: 97.9 (07 Aug 2017 09:53)  HR: 76 (07 Aug 2017 09:53) (76 - 103)  BP: 130/82 (07 Aug 2017 09:53) (129/80 - 130/82)  BP(mean): --  RR: 17 (07 Aug 2017 09:53) (17 - 18)  SpO2: --

## 2017-08-08 PROCEDURE — 99232 SBSQ HOSP IP/OBS MODERATE 35: CPT

## 2017-08-08 RX ADMIN — DIVALPROEX SODIUM 250 MILLIGRAM(S): 500 TABLET, DELAYED RELEASE ORAL at 09:44

## 2017-08-08 RX ADMIN — OLANZAPINE 5 MILLIGRAM(S): 15 TABLET, FILM COATED ORAL at 21:20

## 2017-08-08 RX ADMIN — Medication 5 MILLIGRAM(S): at 22:59

## 2017-08-08 RX ADMIN — Medication 1 APPLICATION(S): at 21:20

## 2017-08-08 RX ADMIN — DIVALPROEX SODIUM 500 MILLIGRAM(S): 500 TABLET, DELAYED RELEASE ORAL at 21:20

## 2017-08-08 RX ADMIN — Medication 200 MILLIGRAM(S): at 16:41

## 2017-08-08 RX ADMIN — Medication 5 MILLIGRAM(S): at 10:38

## 2017-08-08 RX ADMIN — Medication 1 MILLIGRAM(S): at 09:44

## 2017-08-08 RX ADMIN — ERGOCALCIFEROL 50000 UNIT(S): 1.25 CAPSULE ORAL at 09:43

## 2017-08-08 RX ADMIN — Medication 1 APPLICATION(S): at 09:44

## 2017-08-08 RX ADMIN — Medication 200 MILLIGRAM(S): at 07:43

## 2017-08-08 NOTE — PROGRESS NOTE BEHAVIORAL HEALTH - NSBHCHARTREVIEWVS_PSY_A_CORE FT
Vital Signs Last 24 Hrs  T(C): 36.6 (08 Aug 2017 09:01), Max: 36.6 (07 Aug 2017 17:00)  T(F): 97.8 (08 Aug 2017 09:01), Max: 97.8 (07 Aug 2017 17:00)  HR: 88 (08 Aug 2017 09:01) (88 - 89)  BP: 140/73 (08 Aug 2017 09:01) (120/74 - 140/73)  BP(mean): --  RR: 20 (08 Aug 2017 09:01) (18 - 20)  SpO2: --

## 2017-08-08 NOTE — PROGRESS NOTE BEHAVIORAL HEALTH - SUMMARY
63 yo AA  female with reported diagnosis of bipolar II d/o, 1 prior admission on [facility], hx of alcohol use bib daughter w/ concerns of depressive symptoms and paranoid ideation that has worsened over last few weeks, aggravated by passing of godson and medication noncompliance. Pt initially presented with depressive symptoms and expressed vague paranoid ideation. Daughter corroborates pt's self-report of mental state. She is not suicidal/homicidal w/no hx of past attempts.  Required IP admission as she is unable to care for herself due to the severity of her depression.  She is presently not showing si/reporting sy of ETOH withdrawal. Initially, resumed on mood stabilizer and mirtazapine, monitored closely for mood improvement and ETOH withdrawal and resumed OP meds for SLE. On 7/25, Mirtazapine was discontinued and Zyprexa was started at 2.5mg due to concerns of hypomania/manic symptoms.  On 7/26, Depakote was increased from 250mg PO BID to 250mg AM + 500mg PM. Pt continues to have an elated mood and mildly pressured speech though her sleep/appetite are well preserved.  Patient continues to tolerate medication regimen well with no reported or observed side effects or adverse reactions.Her mood first became elated ; but soon she started becoming intermittently irritable and labile.  Depakote was increased to 500mg BID on 7/31 and Zyprexa was increased to 5mg on 8/2. Pt's mood has become more stable over last 2-4 days with fewer episodes of irritability. As of 8/7, her mood has become more stable; AODLs good; plan is to discharge today once family member arrives to pick her up

## 2017-08-08 NOTE — PROGRESS NOTE BEHAVIORAL HEALTH - OTHER
Still underweight but looks better/healthier on the whole Improvement in mood; not irritable Congruent Tends to become a little fixated on subjects related to nutrition/weight/body image; still mildly tangential

## 2017-08-08 NOTE — PROGRESS NOTE BEHAVIORAL HEALTH - NSBHFUPINTERVALHXFT_PSY_A_CORE
Patient is a 63 yo SAAF lives alone, on disability, h/o bipolar disorder, known to undersigned from prior inpatient psychiatric hospitalization at [facility], h/o alcohol and cocaine use, BIB daughter with worsening depression and inability to care for herself in the context of recent loss of her godson and medication non-compliance.  Pt has had decreased appetite, with consequential weight loss, insomnia, low energy, and decreased concentration, along with paranoid ideation re: neighbors and family.  She was restarted on VPA and mirtazapine.  She developed elevated mood and increase in paranoia, therefore, VPA was optimized, mirtazapine discontinued, and olanzapine initiated with good effect.    Today, patient is currently calm, cooperative, pleasant and in behavioral control.  She is compliant with medications, tolerating meds without side effects.  She slept well last night.  She reports good appetite and is seen eating appropriately with notable weight gain.  She denies paranoia/psychosis.  Feels safe and is future-oriented.  Visible and attends various groups. She is understanding of events leading to delayed discharge and is prepared for discharge tomorrow.  She presents as a thin women.   She denies SI/HI/AH/VH.  She does not appear to be responding to internal stimuli.  No overt delusions.  “good” mood, euthymic affect, RRR. Improving insight into illness.  AOx3.  VSS.  DDX- BAD, MRE mixed    Patient’s modifiable risk factors have been mitigated as patient has a stable mood and affect, denies SI/HI/AH/VH, improved appetite, treatment compliance and pt expresses understanding of risks associated with alcohol use and expresses desire to abstain.  She continues to have good family support, is future-oriented, with good insight into illness. Patient is currently at low acute risk of harm to self/others at this time.  Will continue olanzapine disintegrating tablets (Zyprexa Zydis) to 5mg qhs for mood lability associated with BAD and intermittent sleep difficulties.  Slow titrations given patient's small body stature/age.  Will continue VPA 250mg qam/500mg qhs given patient’s therapeutic VPA level and gradual mood stabilization. Psychoeducation provided re: importance of treatment compliance upon discharge.  Pt expressed understanding and accepted am medications.  Encourage po intake/hydration.  Psychoeducation provided re: risks of substance abuse, incl alcohol.  Patient expresses understanding and importance of abstinence.  Cont tx as above for SLE.  Discharge delayed to tomorrow secondary to family not being able to  patient until tomorrow.

## 2017-08-09 VITALS
TEMPERATURE: 98 F | DIASTOLIC BLOOD PRESSURE: 63 MMHG | HEART RATE: 85 BPM | SYSTOLIC BLOOD PRESSURE: 118 MMHG | RESPIRATION RATE: 18 BRPM

## 2017-08-09 PROCEDURE — 99238 HOSP IP/OBS DSCHRG MGMT 30/<: CPT

## 2017-08-09 PROCEDURE — 84134 ASSAY OF PREALBUMIN: CPT

## 2017-08-09 PROCEDURE — 97116 GAIT TRAINING THERAPY: CPT

## 2017-08-09 PROCEDURE — 99285 EMERGENCY DEPT VISIT HI MDM: CPT | Mod: 25

## 2017-08-09 PROCEDURE — 93005 ELECTROCARDIOGRAM TRACING: CPT

## 2017-08-09 PROCEDURE — 80164 ASSAY DIPROPYLACETIC ACD TOT: CPT

## 2017-08-09 PROCEDURE — 80053 COMPREHEN METABOLIC PANEL: CPT

## 2017-08-09 PROCEDURE — 85027 COMPLETE CBC AUTOMATED: CPT

## 2017-08-09 PROCEDURE — 80048 BASIC METABOLIC PNL TOTAL CA: CPT

## 2017-08-09 PROCEDURE — 81001 URINALYSIS AUTO W/SCOPE: CPT

## 2017-08-09 PROCEDURE — 97161 PT EVAL LOW COMPLEX 20 MIN: CPT

## 2017-08-09 PROCEDURE — 80307 DRUG TEST PRSMV CHEM ANLYZR: CPT

## 2017-08-09 PROCEDURE — 36415 COLL VENOUS BLD VENIPUNCTURE: CPT

## 2017-08-09 PROCEDURE — 85025 COMPLETE CBC W/AUTO DIFF WBC: CPT

## 2017-08-09 RX ADMIN — Medication 5 MILLIGRAM(S): at 10:37

## 2017-08-09 RX ADMIN — DIVALPROEX SODIUM 250 MILLIGRAM(S): 500 TABLET, DELAYED RELEASE ORAL at 10:37

## 2017-08-09 RX ADMIN — Medication 200 MILLIGRAM(S): at 07:37

## 2017-08-09 RX ADMIN — Medication 1 MILLIGRAM(S): at 10:37

## 2017-08-09 NOTE — PROGRESS NOTE BEHAVIORAL HEALTH - NS ED [facility] REVIEW OF ED CHART AVAILABLE IMAGING REVIEWED
None available

## 2017-08-09 NOTE — PROGRESS NOTE BEHAVIORAL HEALTH - CASE SUMMARY
Patient is a 61 yo SAAF lives alone, on disability, h/o bipolar disorder, known to undersigned from prior inpatient psychiatric hospitalization at [facility], h/o alcohol and cocaine use, BIB daughter with worsening depression and inability to care for herself in the context of recent loss of her godson and medication non-compliance.  Pt has had decreased appetite, with consequential weight loss, insomnia, low energy, and decreased concentration, along with paranoid ideation re: neighbors and family.  She was restarted on VPA and mirtazapine.  She developed elevated mood and increase in paranoia, therefore, VPA was optimized, mirtazapine discontinued, and olanzapine initiated with good effect.    Today, patient is currently calm, cooperative, pleasant and in behavioral control.  She is compliant with medications, tolerating meds without side effects.  She slept well last night.  She reports improved mood and looks forward to discharge today.  She reports good appetite and is seen eating appropriately with notable weight gain.  She denies paranoia/psychosis.  Feels safe and is future-oriented.  Visible and attends various groups. She presents as a thin women.   She denies SI/HI/AH/VH.  She does not appear to be responding to internal stimuli.  No overt delusions.  “good” mood, euthymic affect, RRR. Improving insight into illness.  AOx3.  Labs/Ekg reviewed. VPA trough level – 90 (on 250mg qam/500mg qhs).  CBC (to monitor for thrombocytopenia) and CMP (to monitor for hepatotoxicity) WNL.  DDX- BAD, MRE mixed  ;  Patient’s modifiable risk factors have been mitigated as patient has a stable mood and affect, denies SI/HI/AH/VH, improved appetite, treatment compliance and pt expresses understanding of risks associated with alcohol use and expresses desire to abstain.  She continues to have good family support, is future-oriented, with good insight into illness. Patient is currently at low acute risk of harm to self/others at this time.  Will continue olanzapine disintegrating tablets (Zyprexa Zydis) to 5mg qhs for mood lability associated with BAD and intermittent sleep difficulties.  Slow titrations given patient's small body stature/age.  Will continue VPA 250mg qam/500mg qhs given patient’s therapeutic VPA level and gradual mood stabilization. Psychoeducation provided re: importance of treatment compliance upon discharge.  Pt expressed understanding and accepted am medications.  Encourage po intake/hydration.  Psychoeducation provided re: risks of substance abuse, incl alcohol.  Patient expresses understanding and importance of abstinence.  Cont tx as above for SLE.  Discharge today. Patient is a 61 yo SAAF lives alone, on disability, h/o bipolar disorder, known to undersigned from prior inpatient psychiatric hospitalization at [facility], h/o alcohol and cocaine use, BIB daughter with worsening depression and inability to care for herself in the context of recent loss of her godson and medication non-compliance.  Pt has had decreased appetite, with consequential weight loss, insomnia, low energy, and decreased concentration, along with paranoid ideation re: neighbors and family.  She was restarted on VPA and mirtazapine.  She developed elevated mood and increase in paranoia, therefore, VPA was optimized, mirtazapine discontinued, and olanzapine initiated with good effect.    Patient is currently calm, cooperative, pleasant and in behavioral control.  She is compliant with medications, tolerating meds without side effects.  She slept well last night.  She reports good appetite and is seen eating appropriately with notable weight gain.  She denies paranoia/psychosis.  She feels safe and is future-oriented.  Visible and attends various groups. She is future-oriented, looking forward to discharge today with plans for what she wants to do when she gets home.  She presents as a thin women with good grooming and hygiene.   She denies SI/HI/AH/VH.  She does not appear to be responding to internal stimuli.  No overt delusions.  “good” mood, euthymic affect, RRR. Improving insight into illness.  AOx3.  VSS.  DDX- BAD, MRE mixed    Patient’s modifiable risk factors have been mitigated as patient has a stable mood and affect, denies SI/HI/AH/VH, improved appetite, treatment compliance and pt expresses understanding of risks associated with alcohol use and expresses desire to abstain.  She continues to have good family support, is future-oriented, with good insight into illness. Patient is currently at low acute risk of harm to self/others at this time.  Will continue olanzapine disintegrating tablets (Zyprexa Zydis) to 5mg qhs for mood lability associated with BAD and intermittent sleep difficulties.  Slow titrations given patient's small body stature/age.  Will continue VPA 250mg qam/500mg qhs given patient’s therapeutic VPA level and gradual mood stabilization. Psychoeducation provided re: importance of treatment compliance upon discharge.  Pt expressed understanding and accepted am medications.  Encourage po intake/hydration.  Psychoeducation provided re: risks of substance abuse, incl alcohol.  Patient expresses understanding and importance of abstinence.  Cont tx as above for SLE.  Discharge today.

## 2017-08-09 NOTE — PROGRESS NOTE BEHAVIORAL HEALTH - NSBHADMITIPREASON_PSY_A_CORE
Danger to self; mental illness expected to respond to inpatient care
other reason/Unable to take care of self
other reason/Unable to take care of self
Unable to take care of self/other reason
Unable to take care of self/other reason
other reason/Unable to take care of self
other reason/Unable to take care of self
Unable to take care of self/other reason
other reason/Unable to take care of self
Unable to take care of self/other reason
other reason/Unable to take care of self
Unable to take care of self/other reason

## 2017-08-09 NOTE — PROGRESS NOTE BEHAVIORAL HEALTH - NSBHADMITIPOBS_PSY_A_CORE
Routine observation

## 2017-08-09 NOTE — PROGRESS NOTE BEHAVIORAL HEALTH - PROBLEM SELECTOR PLAN 2
Confirmed meds with pharmacy; d/w Medical Consult Service; recommended resumption of OP regimen    -MTX 10mg on Fri and Saturday  -Prednisone 5mg PO BID  Hydroxychloroquine 200mg PO BID  -Vitamin D 50K units once/wk
-MTX 10mg on Fri and Saturday  -Prednisone 5mg PO BID  Hydroxychloroquine 200mg PO BID  -Vitamin D 50K units once/wk
Confirmed meds with pharmacy; d/w Medical Consult Service; recommended resumption of OP regimen    -MTX 10mg on Fri and Saturday  -Prednisone 5mg PO BID  Hydroxychloroquine 200mg PO BID  -Vitamin D 50K units once/wk
-MTX 10mg on Fri and Saturday  -Prednisone 5mg PO BID  Hydroxychloroquine 200mg PO BID  -Vitamin D 50K units once/wk

## 2017-08-09 NOTE — PROGRESS NOTE BEHAVIORAL HEALTH - GAIT / STATION
Normal gait / station
Other

## 2017-08-09 NOTE — PROGRESS NOTE BEHAVIORAL HEALTH - NSBHATTESTSEENBY_PSY_A_CORE
Attending Psychiatrist supervising NP/Trainee, meeting pt...
attending Psychiatrist without NP/Trainee
attending Psychiatrist without NP/Trainee
Attending Psychiatrist supervising NP/Trainee, meeting pt...
Attending Psychiatrist supervising NP/Trainee, meeting pt...
NP without Attending Psychiatrist
Attending Psychiatrist supervising NP/Trainee, meeting pt...
attending Psychiatrist without NP/Trainee
Attending Psychiatrist supervising NP/Trainee, meeting pt...
Attending Psychiatrist supervising NP/Trainee, meeting pt...
attending Psychiatrist without NP/Trainee
Attending Psychiatrist supervising NP/Trainee, meeting pt...
Attending Psychiatrist supervising NP/Trainee, meeting pt...
attending Psychiatrist without NP/Trainee

## 2017-08-09 NOTE — PROGRESS NOTE BEHAVIORAL HEALTH - NSBHADMITIPREASONDETAILS_PSY_A_CORE FT
Unable to take care of self due to severity of depression
awaiting  from family tomorrow
Unable to take care of self due to severity of depression

## 2017-08-09 NOTE — PROGRESS NOTE BEHAVIORAL HEALTH - PROBLEM SELECTOR PLAN 3
Ativan 2mg Q4PRN for si/sy of ETOH withdrawal  Folic Acid 1mg daily
Folic Acid 1mg daily
Folic Acid 1mg daily  motivational interviewing
Ativan 2mg Q4PRN for si/sy of ETOH withdrawal  Folic Acid 1mg daily
Folic Acid 1mg daily

## 2017-08-09 NOTE — PROGRESS NOTE BEHAVIORAL HEALTH - PROBLEM SELECTOR PROBLEM 2
SLE (systemic lupus erythematosus)

## 2017-08-09 NOTE — PROGRESS NOTE BEHAVIORAL HEALTH - NSBHADMITCOORDCAREOTHER_PSY_A_CORE FT
nursing, creative art therapists
nursing, creative arts therapists
nursing, creative art therapists
nursing, creative arts therapists

## 2017-08-09 NOTE — PROGRESS NOTE BEHAVIORAL HEALTH - NSBHFUPINTERVALHXFT_PSY_A_CORE
Evaluated at bedside; reported having slept well overnight; appetite well preserved; mood is notably better; future-oriented. Denied SI/HI/AH/VH; no delusions verbalized; compliant w/standing meds; no side-effects noted/observed. Had a discussion w/her about discharge and aftercare. Pt. appreciates need to remain compliant w/Depakote and Zyprexa.DC later on today when family member comes to pick her up.

## 2017-08-09 NOTE — PROGRESS NOTE BEHAVIORAL HEALTH - NSBHADMITCOORDWITH_PSY_A_CORE
family/Caregiver/social work/other/medical staff
medical staff/other/social work
medical staff/other/social work
medical staff/social work/other
medical staff/social work/other
social work/other/medical staff
other/medical staff/social work
other/social work/medical staff
medical staff/other/social work
medical staff/social work/other
medical staff/social work
family/Caregiver/social work/other/medical staff
social work/other/medical staff
medical staff/other/social work
medical staff/other/social work
social work/medical staff

## 2017-08-09 NOTE — PROGRESS NOTE BEHAVIORAL HEALTH - NSBHADMITMEDEDUDETAILS_A_CORE FT
Discussed nutrition and medications with risks, benefits, alternatives
R/B/A of medications discussed

## 2017-08-09 NOTE — PROGRESS NOTE BEHAVIORAL HEALTH - AFFECT RANGE
Constricted
Full
Constricted
Full
Full
Labile
Full

## 2017-08-09 NOTE — PROGRESS NOTE BEHAVIORAL HEALTH - NSBHCHARTREVIEWVS_PSY_A_CORE FT
Vital Signs Last 24 Hrs  T(C): 36.4 (09 Aug 2017 10:21), Max: 36.4 (09 Aug 2017 10:21)  T(F): 97.6 (09 Aug 2017 10:21), Max: 97.6 (09 Aug 2017 10:21)  HR: 85 (09 Aug 2017 10:21) (85 - 114)  BP: 118/63 (09 Aug 2017 10:21) (104/71 - 118/63)  BP(mean): --  RR: 18 (09 Aug 2017 10:21) (18 - 18)  SpO2: --

## 2017-08-09 NOTE — PROGRESS NOTE BEHAVIORAL HEALTH - NSBHFUPTYPE_PSY_A_CORE
Inpatient
Inpatient-On Service Note
Inpatient

## 2017-08-09 NOTE — PROGRESS NOTE BEHAVIORAL HEALTH - NS ED BHA REVIEW OF ED CHART AVAILABLE LABS REVIEWED
Yes
None available
Yes
None available
None available
Yes
None available
None available
Yes
None available
Yes

## 2017-08-09 NOTE — PROGRESS NOTE BEHAVIORAL HEALTH - PRIMARY DX
Bipolar 1 disorder, depressed, severe
Bipolar disorder with psychotic features
Bipolar 1 disorder, depressed, severe
Bipolar 2 disorder
Bipolar disorder with psychotic features
Bipolar 1 disorder, depressed, severe
Bipolar disorder with psychotic features

## 2017-08-09 NOTE — PROGRESS NOTE BEHAVIORAL HEALTH - PROBLEM SELECTOR PROBLEM 3
Alcohol use

## 2017-08-09 NOTE — PROGRESS NOTE BEHAVIORAL HEALTH - LANGUAGE
No abnormalities noted
Normal repetition/Normal naming
Normal repetition/Normal naming
No abnormalities noted
Normal repetition/Normal naming
No abnormalities noted
Normal naming/Normal repetition
Normal naming/Normal repetition
Normal repetition/Other/Normal naming

## 2017-08-09 NOTE — PROGRESS NOTE BEHAVIORAL HEALTH - NS ED BHA AXIS I PRIMARY CODE FT
F31.4
F31.9
F31.4
F31.81
F31.9
F31.4
F31.9

## 2017-08-09 NOTE — PROGRESS NOTE BEHAVIORAL HEALTH - PERCEPTIONS
No abnormalities
Auditory hallucinations
No abnormalities

## 2017-08-09 NOTE — PROGRESS NOTE BEHAVIORAL HEALTH - NS ED BHA REVIEW OF ED CHART AVAILABLE INVESTIGATIONS REVIEWED
Yes
None available

## 2017-08-09 NOTE — PROGRESS NOTE BEHAVIORAL HEALTH - NSBHLOC_PSY_A_CORE
Alert

## 2017-08-09 NOTE — PROGRESS NOTE BEHAVIORAL HEALTH - NSBHADMITCOUNSEL_PSY_A_CORE
instructions for management, treatment and follow up/client/family/caregiver education/importance of adherence to chosen treatment/prognosis/diagnostic results/impressions and/or recommended studies/risks and benefits of treatment options/risk factor reduction
instructions for management, treatment and follow up/risk factor reduction/importance of adherence to chosen treatment/client/family/caregiver education/prognosis/risks and benefits of treatment options/diagnostic results/impressions and/or recommended studies
prognosis/client/family/caregiver education/risks and benefits of treatment options/instructions for management, treatment and follow up/risk factor reduction/diagnostic results/impressions and/or recommended studies/importance of adherence to chosen treatment
prognosis/risk factor reduction/instructions for management, treatment and follow up/client/family/caregiver education/diagnostic results/impressions and/or recommended studies/importance of adherence to chosen treatment/risks and benefits of treatment options
risks and benefits of treatment options/diagnostic results/impressions and/or recommended studies/client/family/caregiver education/prognosis/instructions for management, treatment and follow up/risk factor reduction/importance of adherence to chosen treatment
client/family/caregiver education/instructions for management, treatment and follow up/diagnostic results/impressions and/or recommended studies/risks and benefits of treatment options/risk factor reduction/prognosis/importance of adherence to chosen treatment
client/family/caregiver education/risks and benefits of treatment options/risk factor reduction/diagnostic results/impressions and/or recommended studies/prognosis/importance of adherence to chosen treatment/instructions for management, treatment and follow up
importance of adherence to chosen treatment/diagnostic results/impressions and/or recommended studies/risks and benefits of treatment options/risk factor reduction/prognosis/instructions for management, treatment and follow up
client/family/caregiver education/diagnostic results/impressions and/or recommended studies/risks and benefits of treatment options/prognosis/importance of adherence to chosen treatment/instructions for management, treatment and follow up/risk factor reduction
client/family/caregiver education/risks and benefits of treatment options/diagnostic results/impressions and/or recommended studies/importance of adherence to chosen treatment/instructions for management, treatment and follow up/risk factor reduction/prognosis
client/family/caregiver education/risk factor reduction/diagnostic results/impressions and/or recommended studies/importance of adherence to chosen treatment/risks and benefits of treatment options/instructions for management, treatment and follow up/prognosis
instructions for management, treatment and follow up/client/family/caregiver education/risks and benefits of treatment options/risk factor reduction/diagnostic results/impressions and/or recommended studies/importance of adherence to chosen treatment/prognosis
instructions for management, treatment and follow up/importance of adherence to chosen treatment/risk factor reduction/diagnostic results/impressions and/or recommended studies/risks and benefits of treatment options/client/family/caregiver education
risk factor reduction/importance of adherence to chosen treatment/prognosis/diagnostic results/impressions and/or recommended studies/risks and benefits of treatment options/instructions for management, treatment and follow up
instructions for management, treatment and follow up/risks and benefits of treatment options/risk factor reduction/importance of adherence to chosen treatment/prognosis/diagnostic results/impressions and/or recommended studies
diagnostic results/impressions and/or recommended studies/instructions for management, treatment and follow up/importance of adherence to chosen treatment/client/family/caregiver education/risks and benefits of treatment options/risk factor reduction/prognosis

## 2017-08-09 NOTE — PROGRESS NOTE BEHAVIORAL HEALTH - PROBLEM SELECTOR PROBLEM 1
Major depressive disorder, recurrent, severe with psychotic features

## 2017-08-09 NOTE — PROGRESS NOTE BEHAVIORAL HEALTH - ATTENTION / CONCENTRATION
Normal
Other

## 2017-08-09 NOTE — PROGRESS NOTE BEHAVIORAL HEALTH - NSBHADMITMEDEDU_PSY_A_CORE
yes...

## 2017-08-09 NOTE — PROGRESS NOTE BEHAVIORAL HEALTH - SUMMARY
61 yo AA  female with reported diagnosis of bipolar II d/o, 1 prior admission on [facility], hx of alcohol use bib daughter w/ concerns of depressive symptoms and paranoid ideation that has worsened over last few weeks, aggravated by passing of godson and medication noncompliance. Pt initially presented with depressive symptoms and expressed vague paranoid ideation. Daughter corroborates pt's self-report of mental state. She is not suicidal/homicidal w/no hx of past attempts.  Required IP admission as she is unable to care for herself due to the severity of her depression.  She is presently not showing si/reporting sy of ETOH withdrawal. Initially, resumed on mood stabilizer and mirtazapine, monitored closely for mood improvement and ETOH withdrawal and resumed OP meds for SLE. On 7/25, Mirtazapine was discontinued and Zyprexa was started at 2.5mg due to concerns of hypomania/manic symptoms.  On 7/26, Depakote was increased from 250mg PO BID to 250mg AM + 500mg PM. Pt continues to have an elated mood and mildly pressured speech though her sleep/appetite are well preserved.  Patient continues to tolerate medication regimen well with no reported or observed side effects or adverse reactions.Her mood first became elated ; but soon she started becoming intermittently irritable and labile.  Depakote was increased to 500mg BID on 7/31 and Zyprexa was increased to 5mg on 8/2. Pt's mood has become more stable over last 2-4 days with fewer episodes of irritability. As of 8/9, her mood has become more stable; AODLs good; plan is to discharge today once family member arrives to pick her up

## 2017-08-11 DIAGNOSIS — Z72.89 OTHER PROBLEMS RELATED TO LIFESTYLE: ICD-10-CM

## 2017-08-11 DIAGNOSIS — F32.9 MAJOR DEPRESSIVE DISORDER, SINGLE EPISODE, UNSPECIFIED: ICD-10-CM

## 2017-08-11 DIAGNOSIS — Z91.14 PATIENT'S OTHER NONCOMPLIANCE WITH MEDICATION REGIMEN: ICD-10-CM

## 2017-08-11 DIAGNOSIS — F31.9 BIPOLAR DISORDER, UNSPECIFIED: ICD-10-CM

## 2017-08-11 DIAGNOSIS — M32.9 SYSTEMIC LUPUS ERYTHEMATOSUS, UNSPECIFIED: ICD-10-CM

## 2020-04-21 NOTE — PROGRESS NOTE BEHAVIORAL HEALTH - NSBHFUPSTRENGTHS_PSY_A_CORE
Statement Selected Motivated and ready for change/Attempting to realize his/her potential/Has supportive interpersonal relationships with family, friends or peers

## 2021-12-07 NOTE — PROGRESS NOTE BEHAVIORAL HEALTH - NSBHADMITIPOBSFT_PSY_A_CORE
Length To Time In Minutes Device Was In Place: 10
on psychiatric unit
Not currently suicidal/homicidal
Not currently acutely suicidal/homicidal
Not currently suicidal/homicidal
no current SI/I/P, feels safe on the unit.  no indication for 1:1 observation.  can continue [id number] observation.
Not currently suicidal/homicidal
no current SI/I/P, feels safe on the unit.  no indication for 1:1 observation.  can continue [id number] observation.

## 2022-11-25 NOTE — PROGRESS NOTE BEHAVIORAL HEALTH - NS ED BHA MED ROS NEUROLOGICAL
[facility] Hospitalist Internal Medicine Progress Note      Subjective:    No CP, SOB, abd pain, n/v.  No reaction on initial read from patch tests    Obj        Intake/Output Summary (Last 24 hours) at 2022 0923  Last data filed at 2022 0500  Gross per 24 hour   Intake 2005.5 ml   Output 2425 ml   Net -419.5 ml        No data recorded  MAP (mmHg)  Av.3  Min: 55  Max: 89          Vital Last Value 24 Hour Range   Temperature 97.3 °F (36.3 °C) (22 0400) Temp  Min: 97.3 °F (36.3 °C)  Max: 98.1 °F (36.7 °C)   Pulse 96 (22 0700) Pulse  Min: 75  Max: 97   Respiratory 16 (22 0700) Resp  Min: 12  Max: 27   Non-Invasive  Blood Pressure 106/66 (22 0711) BP  Min: 79/47  Max: 129/76   Pulse Oximetry 100 % (22 0700) SpO2  Min: 96 %  Max: 100 %   Arterial   Blood Pressure   No data recorded      PAP (mmHg): (41-57)/(21-33) 54/27  CVP:  [9 mmHg-11 mmHg] 10 mmHg  CO:  [4.9 l/min-6.5 l/min] 4.9 l/min  CI:  [2.1 l/min/m2-2.7 l/min/m2] 2.1 l/min/m2    GEN: Sitting up comfortably in bed, no acute distress  HENT: Normocephalic atraumatic, external ear normal  EYES: EOMI, no scleral icterus or conjuctival erythema  NECK: Supple, no ttp  RESPIRATORY: CTA anteriorly, equal chest rise, no wheezing  CARDIOVASCULAR: S1S2 nml, RRR, +murmur  GASTROINTESTINAL: Soft, no TTP, ND normal BS. no guarding. rigidity or RT  MUSCULOSKELETAL:Range of motion intact, no fractures  NEUROLOGIC: Non-focal, alert and oriented to person, place and time   PSYCHIATRIC: Cooperative with appropriate mood and affect    Hospital Meds  Prior to Admission medications    Medication Sig Start Date End Date Taking? Authorizing Provider   furosemide (LASIX) 40 MG tablet Take 1 tablet by mouth daily. 10/27/22  Yes Shayne Diaz MD   carvedilol (Coreg) 3.125 MG tablet Take 1 tablet by mouth in the morning and 1 tablet in the evening. Take with meals. 22 Yes Shayne Diaz MD   aspirin (ECOTRIN) 81 MG EC tablet Take 1 tablet  by mouth daily. 4/4/22  Yes Shayne Diaz MD   Metamucil Fiber Chew Tab Chew 3 tablets by mouth daily.    Outside Provider       Current Facility-Administered Medications   Medication Dose Route Frequency Provider Last Rate Last Admin   • SODIUM CHLORIDE 0.45 % IV SOLN Pyxis Override            • potassium CHLORIDE (KLOR-CON) packet 20 mEq  20 mEq Oral Once TREVER Restrepo       • midodrine (PROAMATINE) tablet 10 mg  10 mg Oral 4 times per day Marty Love MD   10 mg at 11/25/22 0621   • chlorhexidine gluconate (PERIDEX) 0.12 % solution 15 mL  15 mL Swish & Spit On Call to Procedure TREVER Soria       • mupirocin (BACTROBAN) 2 % ointment 1 application  1 application Each Nare On Call to Procedure TREVER Soria       • empagliflozin (JARDIANCE) tablet 10 mg  10 mg Oral Daily Derick Espana MD   10 mg at 11/25/22 0904   • folic acid (FOLATE) tablet 1 mg  1 mg Oral Daily Coleen Trotter MD   1 mg at 11/25/22 0904   • furosemide (LASIX INJECT) injection 40 mg  40 mg Intravenous Daily Derick Espana MD   40 mg at 11/25/22 0904   • ergocalciferol (DRISDOL) 200 mcg (8,000 units)/mL oral solution 1,260 mcg  1.25 mg Oral Once per day on Fri Juan De La Torre MD   1,260 mcg at 11/18/22 0937   • ferrous sulfate (65 mg Fe per 325 mg) tablet 325 mg  325 mg Oral Once per day on Mon Wed Fri Coleen Trotter MD   325 mg at 11/25/22 0904   • Potassium Standard Replacement Protocol (Levels 3.5 and lower)   Does not apply See Admin Instructions TREVER Restrepo       • Potassium Replacement (Levels 3.6 - 4)   Does not apply See Admin Instructions TREVER Restrepo       • Magnesium Standard Replacement Protocol   Does not apply See Admin Instructions TREVER Restrepo       • spironolactone (ALDACTONE) tablet 25 mg  25 mg Oral Daily Shayne Diaz MD   25 mg at 11/25/22 0904   • sodium chloride (PF) 0.9 % injection 2 mL  2 mL Intracatheter 2 times per day Richard Cheng MD    2 mL at 11/25/22 0904   • heparin (porcine) injection 5,000 Units  5,000 Units Subcutaneous 3 times per day Ynag Ness MD   5,000 Units at 11/25/22 0620   • aspirin (ECOTRIN) enteric coated tablet 81 mg  81 mg Oral Daily Luke Guillory MD   81 mg at 11/25/22 0904   • pantoprazole (PROTONIX) EC tablet 40 mg  40 mg Oral QAM AC Yang Ness MD   40 mg at 11/25/22 0621       Current Facility-Administered Medications   Medication Dose Route Frequency Provider Last Rate Last Admin   • NORepinephrine (LEVOPHED) 8 mg/250 mL in dextrose 5 % infusion  0-100 mcg/min Intravenous Continuous Marty Love MD   Completed at 11/24/22 0700   • milrinone (PRIMACOR) 20 mg/100 mL in dextrose 5 % infusion premix  0.125 mcg/kg/min (Dosing Weight) Intravenous Continuous Abbe Hernandez MD 4.3 mL/hr at 11/20/22 1100 0.125 mcg/kg/min at 11/20/22 1100   • sodium chloride 0.9% infusion   Intravenous Continuous Eladia Vihnanek, APNP 11 mL/hr at 11/21/22 2003 Rate Verify at 11/21/22 2003   • sodium chloride 0.9% infusion   Intravenous Continuous Eladia Vihnanek, APNP 10 mL/hr at 11/22/22 1847 Rate Verify at 11/22/22 1847   • dextrose 5 % infusion   Intravenous Continuous Eladia Vihnanek, APNP 40 mL/hr at 11/10/22 0003 New Bag at 11/10/22 0003   • sodium chloride 0.9% infusion   Intravenous Continuous Eladia Vihnanek, APNP 6 mL/hr at 11/22/22 1847 Rate Verify at 11/22/22 1847       Current Facility-Administered Medications   Medication Dose Route Frequency Provider Last Rate Last Admin   • sodium chloride 0.9 % flush bag 25 mL  25 mL Intravenous PRN Eladia Vihnanek, APNP       • ondansetron (ZOFRAN ODT) disintegrating tablet 4 mg  4 mg Oral Q12H PRN Richard Cheng MD        Or   • ondansetron (ZOFRAN) injection 4 mg  4 mg Intravenous Q12H PRN Richard Cheng MD       • acetaminophen (TYLENOL) tablet 650 mg  650 mg Oral Q4H PRN Richard Cheng MD   650 mg at 11/18/22 2042    Or   • acetaminophen (TYLENOL) suppository 650 mg  650  mg Rectal Q4H PRN Richard Cheng MD       • sodium chloride 0.9 % flush bag 25 mL  25 mL Intravenous PRN Richard Cheng MD            Labs     Recent Labs     11/23/22 0401 11/24/22 0411 11/25/22  0421   WBC 9.1 6.1 6.5   RBC 5.54 5.17 4.94   HGB 13.8 12.9* 12.5*   HCT 43.2 41.1 39.0    184 186   MCV 78.0 79.5 78.9   MCH 24.9* 25.0* 25.3*   MCHC 31.9* 31.4* 32.1   NRBCRE 0 0 0         Recent Labs     11/23/22  0401 11/24/22 0411 11/24/22  1626 11/25/22  0421   SODIUM 136 139  --  136   POTASSIUM 3.9 3.9 4.3 3.8   MG 2.5* 2.2  --  2.4   PHOS 3.9 4.1  --  4.2   CO2 28 28  --  27   ANIONGAP 8 8  --  9   GLUCOSE 115* 91  --  120*   BUN 18 16  --  17   CREATININE 0.91 0.90  --  0.91   BCRAT 20 18  --  19   CALCIUM 9.6 8.8  --  9.1   BILIRUBIN 0.4 0.4  --  0.3   AST 43* 35  --  33   GPT 92* 76*  --  71*   ALKPT 106 95  --  97   GLOB 2.4 3.0  --  2.2   AGR 1.5 1.1  --  1.5        No results for input(s): PCT in the last 72 hours.     Recent Labs   Lab 11/23/22 0401   NTPROB 4,391*        No results for input(s): INR, PT, PTT in the last 72 hours.    No results available in last 24 hours    Cultures  Microbiology Results     None           Assessment and Plan:       Mr Williamson is a 33yo M w/ hx of bicuspid AV s/p repair (age10) and bioprosthetic AVR x2 at age 12 and 17 (4/4/22 TTE LVEF 47%, Bioprostheic AVR w/ Mod AR/AS, Mod MR), recent of aortic valve bioprosthetic dysfunction w/ severe AI, moderate to severe MR, severe TR and HFrEF who p/w SOB.     #Acute on chronic systolic heart failure  #Hx of bicuspid AV s/p repair (age10) and bioprosthetic AVR x2 at age 12 and 17    #Aortic valve bioprosthetic dysfunction w/ severe AI, moderate to severe MR, severe TR   #Nickel allergy  - NT-probnp 9k  - VIANEY 11/8/22: EF 15%, RV moderate to severe dilatation, Severely reduced RV systolic function, Wide open AR, moderate to severe MR, severeTR  - RHC on 11/9 - Elevated RA pressure, Elevated PA pressure, Elevated PCW  pressure, Low cardiac output, cardiac index and mixed venous oxygen saturation, Elevated Pulmonary vascular resistance  -TTE 11/15/22: EF 35%  on milrinone, malfunctioning bioprosthetic Aortic valve reduced RV systolic function  - Diuresis: on lasix 40mg IV QD  - Inotropes: mil weaned off  - GDMT: - coreg and losartan dced, aldactone  - pressors: levo (weaning), on midodrine  -SGLT2i: started on empagliflozin   - on asa  - per AHF team,- Leaning towards OHT with LVAD as back-up as valvular intervention alone would be high risk given the degree of biventricular dysfunction. Would also favor OHT as RV dysfunction could make LVAD a higher risk intervention. May not be IABP candidate d/t severe AI but may be able to submit exception for listing status\"  - LVAD/OHT workup initiated, appreciate recs  -CTA TAVR protocol done 11/16, results reviewed   -CV surgery consult: Have discussed potential interventions including SAVR vs TAVR including potential risks/potential STS risks as well as medical therapy., STS Mortality risk:1%, STS morbidity/mortality risk:12%%  -[facility] on 11/17: recommended TAVR ; however, patient with a nickel allergy, discussed w/ allergist via [employer] as well as cardiology on 11/19, per allergist, no desensitization available for nickel.   - allergist saw patient on 11/26 (note pending), discussed w/ allergist, patch testing applied by derm on 11/22, per derm on 11/24 - \"removed today, No evidence of contact dermatitis at initial read, Will follow up delayed read\"  -Structural HD specialist on consult, plan for TAVR on 11/22 if able to proceed. Discussion of benefits/risks/alterantives of TAVR in context of possible nickel allergy with patient per AHF/cardiology  - mgmt per AHF team      #n/v, intermitt - improved, suspect 2/2 right sided heart failure  - 2/2 primary GI issue and/or 2/2 worsening heart failure  - started PPI  -tolerating diet - GI on consult, appreciate recs     #Vitamin D def  -  started on vitamin D  - endo following, appreciate recs     Mild iron deficiency anemia  - started on Iron  - heme following     DVT ppx - hsq tid        Primary Care Physician  No Pcp    Code Status    Code Status: Full Resuscitation    Yang Ness MD  [facility] Hospitalist  11/25/2022     Patient Privacy Notice      The 21st Century Cures Act makes medical notes like these available to patients in the interest of transparency. Please be advised that this is a medical document. Medical documents are intended to carry relevant information and the clinical opinion of the practitioner. The medical note is intended as medical provider to provider communication, and may appear blunt or direct. It is written in medical language, and may contain abbreviations or verbiage that are unfamiliar.     No complaints

## 2023-08-17 NOTE — PROGRESS NOTE BEHAVIORAL HEALTH - OTHER
Spray Paint Technique: No Spray Paint Text: The liquid nitrogen was applied to the skin utilizing a spray paint frosting technique. Duration Of Freeze Thaw-Cycle (Seconds): 5-10 Show Spray Paint Technique Variable?: Yes Consent: The patient's consent was obtained including but not limited to risks of crusting, scabbing, blistering, scarring, darker or lighter pigmentary change, recurrence, incomplete removal and infection. Number Of Freeze-Thaw Cycles: 2 freeze-thaw cycles Post-Care Instructions: I reviewed with the patient in detail post-care instructions. Patient is to wear sunprotection, and avoid picking at any of the treated lesions. Pt may apply Vaseline to crusted or scabbing areas. Detail Level: Detailed Medical Necessity Clause: This procedure was medically necessary because the lesions that were treated were: Medical Necessity Information: It is in your best interest to select a reason for this procedure from the list below. All of these items fulfill various CMS LCD requirements except the new and changing color options. emaciated, poor dentition

## 2023-11-09 NOTE — ED BEHAVIORAL HEALTH ASSESSMENT NOTE - CURRENT INTENT
Obstructive sleep apnea (MATTHEW) is a condition that makes it hard for you to breathe when you  sleep. People with MATTHEW often experience the following:   Snoring or making gasping noises when they sleep   Are tired when they wake up or during the day, even if sleep all night   Waking up with headaches   Having trouble focusing on tasks     Next Steps  A sleep test is required to diagnose sleep apnea. People of all ages can have it. MATTHEW is  most common among men older than age 40; women older than age 50; those who are  overweight; people with high blood pressure; men with a neck size greater than 17 inches; and  women with a neck size greater than 16 inches.     Risks  If MATTHEW goes untreated, the long-term health risks can include:   High blood pressure   Heart attack   Stroke   Pre-diabetes/Diabetes   Depression     Treatments  There are a variety of treatment options for MATTHEW patients. Continuous positive airway  pressure, or CPAP for short, lets a stream of air flow from a machine, through tubing to a mask  you wear while you sleep. This flow of air keeps your throat open so that you can breathe  freely and not snore. Another option might be an oral appliance, a mouth guard that holds  your bottom jaw forward and keeps your tongue from blocking your airway while you sleep.     Even with these treatments, there are other things you can do to improve your MATTHEW, including  weight loss, quitting smoking and not drinking alcohol.         None known

## 2024-05-24 NOTE — ED BEHAVIORAL HEALTH ASSESSMENT NOTE - OTHER PAST PSYCHIATRIC HISTORY (INCLUDE DETAILS REGARDING ONSET, COURSE OF ILLNESS, INPATIENT/OUTPATIENT TREATMENT)
Hemoglobin 7.6 today  Denied any bleeding  Monitor CBC   patient has h/o bipolar d/o no prior attempt, does not currently have psychiatrist outpatient

## 2024-10-24 ENCOUNTER — INPATIENT (INPATIENT)
Facility: HOSPITAL | Age: 69
LOS: 1 days | Discharge: HOME | End: 2024-10-26
Attending: HOSPITALIST | Admitting: STUDENT IN AN ORGANIZED HEALTH CARE EDUCATION/TRAINING PROGRAM
Payer: MEDICARE

## 2024-10-24 VITALS
OXYGEN SATURATION: 100 % | SYSTOLIC BLOOD PRESSURE: 132 MMHG | RESPIRATION RATE: 16 BRPM | HEART RATE: 78 BPM | DIASTOLIC BLOOD PRESSURE: 74 MMHG | WEIGHT: 89.95 LBS | HEIGHT: 61 IN | TEMPERATURE: 98 F

## 2024-10-24 DIAGNOSIS — Z29.9 ENCOUNTER FOR PROPHYLACTIC MEASURES, UNSPECIFIED: ICD-10-CM

## 2024-10-24 DIAGNOSIS — M06.9 RHEUMATOID ARTHRITIS, UNSPECIFIED: ICD-10-CM

## 2024-10-24 DIAGNOSIS — Z98.89 OTHER SPECIFIED POSTPROCEDURAL STATES: Chronic | ICD-10-CM

## 2024-10-24 DIAGNOSIS — D64.9 ANEMIA, UNSPECIFIED: ICD-10-CM

## 2024-10-24 DIAGNOSIS — M32.9 SYSTEMIC LUPUS ERYTHEMATOSUS, UNSPECIFIED: ICD-10-CM

## 2024-10-24 DIAGNOSIS — R32 UNSPECIFIED URINARY INCONTINENCE: ICD-10-CM

## 2024-10-24 DIAGNOSIS — R62.7 ADULT FAILURE TO THRIVE: ICD-10-CM

## 2024-10-24 DIAGNOSIS — Z78.9 OTHER SPECIFIED HEALTH STATUS: ICD-10-CM

## 2024-10-24 DIAGNOSIS — Z91.81 HISTORY OF FALLING: ICD-10-CM

## 2024-10-24 LAB
ANION GAP SERPL CALC-SCNC: 8 MMOL/L — SIGNIFICANT CHANGE UP (ref 5–17)
APPEARANCE UR: CLEAR — SIGNIFICANT CHANGE UP
BACTERIA # UR AUTO: ABNORMAL /HPF
BASOPHILS # BLD AUTO: 0.02 K/UL — SIGNIFICANT CHANGE UP (ref 0–0.2)
BASOPHILS NFR BLD AUTO: 0.4 % — SIGNIFICANT CHANGE UP (ref 0–2)
BILIRUB UR-MCNC: NEGATIVE — SIGNIFICANT CHANGE UP
BUN SERPL-MCNC: 36 MG/DL — HIGH (ref 7–23)
CALCIUM SERPL-MCNC: 9.3 MG/DL — SIGNIFICANT CHANGE UP (ref 8.4–10.5)
CAST: 2 /LPF — SIGNIFICANT CHANGE UP (ref 0–4)
CHLORIDE SERPL-SCNC: 108 MMOL/L — SIGNIFICANT CHANGE UP (ref 96–108)
CO2 SERPL-SCNC: 23 MMOL/L — SIGNIFICANT CHANGE UP (ref 22–31)
COLOR SPEC: YELLOW — SIGNIFICANT CHANGE UP
CREAT SERPL-MCNC: 0.7 MG/DL — SIGNIFICANT CHANGE UP (ref 0.5–1.3)
DIFF PNL FLD: ABNORMAL
EGFR: 94 ML/MIN/1.73M2 — SIGNIFICANT CHANGE UP
EGFR: 94 ML/MIN/1.73M2 — SIGNIFICANT CHANGE UP
EOSINOPHIL # BLD AUTO: 0.09 K/UL — SIGNIFICANT CHANGE UP (ref 0–0.5)
EOSINOPHIL NFR BLD AUTO: 1.8 % — SIGNIFICANT CHANGE UP (ref 0–6)
GLUCOSE SERPL-MCNC: 94 MG/DL — SIGNIFICANT CHANGE UP (ref 70–99)
GLUCOSE UR QL: NEGATIVE MG/DL — SIGNIFICANT CHANGE UP
HCT VFR BLD CALC: 35.8 % — SIGNIFICANT CHANGE UP (ref 34.5–45)
HGB BLD-MCNC: 11.2 G/DL — LOW (ref 11.5–15.5)
IMM GRANULOCYTES NFR BLD AUTO: 0.2 % — SIGNIFICANT CHANGE UP (ref 0–0.9)
KETONES UR-MCNC: NEGATIVE MG/DL — SIGNIFICANT CHANGE UP
LEUKOCYTE ESTERASE UR-ACNC: NEGATIVE — SIGNIFICANT CHANGE UP
LYMPHOCYTES # BLD AUTO: 1.69 K/UL — SIGNIFICANT CHANGE UP (ref 1–3.3)
LYMPHOCYTES # BLD AUTO: 34.6 % — SIGNIFICANT CHANGE UP (ref 13–44)
MCHC RBC-ENTMCNC: 27.9 PG — SIGNIFICANT CHANGE UP (ref 27–34)
MCHC RBC-ENTMCNC: 31.3 GM/DL — LOW (ref 32–36)
MCV RBC AUTO: 89.1 FL — SIGNIFICANT CHANGE UP (ref 80–100)
MONOCYTES # BLD AUTO: 0.56 K/UL — SIGNIFICANT CHANGE UP (ref 0–0.9)
MONOCYTES NFR BLD AUTO: 11.5 % — SIGNIFICANT CHANGE UP (ref 2–14)
MUCOUS THREADS # UR AUTO: PRESENT
NEUTROPHILS # BLD AUTO: 2.52 K/UL — SIGNIFICANT CHANGE UP (ref 1.8–7.4)
NEUTROPHILS NFR BLD AUTO: 51.5 % — SIGNIFICANT CHANGE UP (ref 43–77)
NITRITE UR-MCNC: NEGATIVE — SIGNIFICANT CHANGE UP
NRBC # BLD: 0 /100 WBCS — SIGNIFICANT CHANGE UP (ref 0–0)
NRBC BLD-RTO: 0 /100 WBCS — SIGNIFICANT CHANGE UP (ref 0–0)
PH UR: 5.5 — SIGNIFICANT CHANGE UP (ref 5–8)
PLATELET # BLD AUTO: 341 K/UL — SIGNIFICANT CHANGE UP (ref 150–400)
POTASSIUM SERPL-MCNC: 4 MMOL/L — SIGNIFICANT CHANGE UP (ref 3.5–5.3)
POTASSIUM SERPL-SCNC: 4 MMOL/L — SIGNIFICANT CHANGE UP (ref 3.5–5.3)
PROT UR-MCNC: ABNORMAL MG/DL
RBC # BLD: 4.02 M/UL — SIGNIFICANT CHANGE UP (ref 3.8–5.2)
RBC # FLD: 16.3 % — HIGH (ref 10.3–14.5)
RBC CASTS # UR COMP ASSIST: 1 /HPF — SIGNIFICANT CHANGE UP (ref 0–4)
SODIUM SERPL-SCNC: 139 MMOL/L — SIGNIFICANT CHANGE UP (ref 135–145)
SP GR SPEC: >=1.03 (ref 1–1.03)
SQUAMOUS # UR AUTO: 7 /HPF — HIGH (ref 0–5)
UROBILINOGEN FLD QL: 0.2 MG/DL — SIGNIFICANT CHANGE UP (ref 0.2–1)
WBC # BLD: 4.89 K/UL — SIGNIFICANT CHANGE UP (ref 3.8–10.5)
WBC # FLD AUTO: 4.89 K/UL — SIGNIFICANT CHANGE UP (ref 3.8–10.5)
WBC UR QL: 2 /HPF — SIGNIFICANT CHANGE UP (ref 0–5)

## 2024-10-24 PROCEDURE — 99285 EMERGENCY DEPT VISIT HI MDM: CPT

## 2024-10-24 PROCEDURE — 93010 ELECTROCARDIOGRAM REPORT: CPT | Mod: 1L

## 2024-10-24 PROCEDURE — 70450 CT HEAD/BRAIN W/O DYE: CPT | Mod: 26,MC

## 2024-10-24 PROCEDURE — 99223 1ST HOSP IP/OBS HIGH 75: CPT | Mod: GC

## 2024-10-24 RX ORDER — ACETAMINOPHEN 500 MG/5ML
650 LIQUID (ML) ORAL ONCE
Refills: 0 | Status: COMPLETED | OUTPATIENT
Start: 2024-10-24 | End: 2024-10-24

## 2024-10-24 RX ORDER — PREDNISONE 20 MG/1
5 TABLET ORAL EVERY 24 HOURS
Refills: 0 | Status: DISCONTINUED | OUTPATIENT
Start: 2024-10-24 | End: 2024-10-26

## 2024-10-24 RX ORDER — INFLUENZA A VIRUS A/IDAHO/07/2018 (H1N1) ANTIGEN (MDCK CELL DERIVED, PROPIOLACTONE INACTIVATED, INFLUENZA A VIRUS A/INDIANA/08/2018 (H3N2) ANTIGEN (MDCK CELL DERIVED, PROPIOLACTONE INACTIVATED), INFLUENZA B VIRUS B/SINGAPORE/INFTT-16-0610/2016 ANTIGEN (MDCK CELL DERIVED, PROPIOLACTONE INACTIVATED), INFLUENZA B VIRUS B/IOWA/06/2017 ANTIGEN (MDCK CELL DERIVED, PROPIOLACTONE INACTIVATED) 15; 15; 15; 15 UG/.5ML; UG/.5ML; UG/.5ML; UG/.5ML
0.5 INJECTION, SUSPENSION INTRAMUSCULAR ONCE
Refills: 0 | Status: DISCONTINUED | OUTPATIENT
Start: 2024-10-24 | End: 2024-10-26

## 2024-10-24 RX ADMIN — PREDNISONE 5 MILLIGRAM(S): 20 TABLET ORAL at 21:13

## 2024-10-24 RX ADMIN — Medication 650 MILLIGRAM(S): at 14:37

## 2024-10-25 DIAGNOSIS — E55.9 VITAMIN D DEFICIENCY, UNSPECIFIED: ICD-10-CM

## 2024-10-25 LAB
24R-OH-CALCIDIOL SERPL-MCNC: 25.5 NG/ML — LOW (ref 30–80)
A1C WITH ESTIMATED AVERAGE GLUCOSE RESULT: 5.5 % — SIGNIFICANT CHANGE UP (ref 4–5.6)
ALBUMIN SERPL ELPH-MCNC: 3.3 G/DL — SIGNIFICANT CHANGE UP (ref 3.3–5)
ALP SERPL-CCNC: 113 U/L — SIGNIFICANT CHANGE UP (ref 40–120)
ALT FLD-CCNC: 9 U/L — LOW (ref 10–45)
ANION GAP SERPL CALC-SCNC: 7 MMOL/L — SIGNIFICANT CHANGE UP (ref 5–17)
AST SERPL-CCNC: 16 U/L — SIGNIFICANT CHANGE UP (ref 10–40)
BASOPHILS # BLD AUTO: 0.02 K/UL — SIGNIFICANT CHANGE UP (ref 0–0.2)
BASOPHILS NFR BLD AUTO: 0.4 % — SIGNIFICANT CHANGE UP (ref 0–2)
BILIRUB SERPL-MCNC: 0.2 MG/DL — SIGNIFICANT CHANGE UP (ref 0.2–1.2)
BUN SERPL-MCNC: 37 MG/DL — HIGH (ref 7–23)
CALCIUM SERPL-MCNC: 8.7 MG/DL — SIGNIFICANT CHANGE UP (ref 8.4–10.5)
CHLORIDE SERPL-SCNC: 106 MMOL/L — SIGNIFICANT CHANGE UP (ref 96–108)
CO2 SERPL-SCNC: 22 MMOL/L — SIGNIFICANT CHANGE UP (ref 22–31)
CREAT SERPL-MCNC: 0.78 MG/DL — SIGNIFICANT CHANGE UP (ref 0.5–1.3)
CRP SERPL-MCNC: 5.2 MG/L — HIGH (ref 0–4)
EGFR: 82 ML/MIN/1.73M2 — SIGNIFICANT CHANGE UP
EGFR: 82 ML/MIN/1.73M2 — SIGNIFICANT CHANGE UP
EOSINOPHIL # BLD AUTO: 0.07 K/UL — SIGNIFICANT CHANGE UP (ref 0–0.5)
EOSINOPHIL NFR BLD AUTO: 1.4 % — SIGNIFICANT CHANGE UP (ref 0–6)
ERYTHROCYTE [SEDIMENTATION RATE] IN BLOOD: 31 MM/HR — HIGH
ESTIMATED AVERAGE GLUCOSE: 111 MG/DL — SIGNIFICANT CHANGE UP (ref 68–114)
FOLATE SERPL-MCNC: 5.7 NG/ML — SIGNIFICANT CHANGE UP
GLUCOSE SERPL-MCNC: 110 MG/DL — HIGH (ref 70–99)
HCT VFR BLD CALC: 33.5 % — LOW (ref 34.5–45)
HGB BLD-MCNC: 10.5 G/DL — LOW (ref 11.5–15.5)
IMM GRANULOCYTES NFR BLD AUTO: 0.2 % — SIGNIFICANT CHANGE UP (ref 0–0.9)
LYMPHOCYTES # BLD AUTO: 1.03 K/UL — SIGNIFICANT CHANGE UP (ref 1–3.3)
LYMPHOCYTES # BLD AUTO: 21.2 % — SIGNIFICANT CHANGE UP (ref 13–44)
MAGNESIUM SERPL-MCNC: 1.8 MG/DL — SIGNIFICANT CHANGE UP (ref 1.6–2.6)
MCHC RBC-ENTMCNC: 28.1 PG — SIGNIFICANT CHANGE UP (ref 27–34)
MCHC RBC-ENTMCNC: 31.3 GM/DL — LOW (ref 32–36)
MCV RBC AUTO: 89.6 FL — SIGNIFICANT CHANGE UP (ref 80–100)
MONOCYTES # BLD AUTO: 0.43 K/UL — SIGNIFICANT CHANGE UP (ref 0–0.9)
MONOCYTES NFR BLD AUTO: 8.8 % — SIGNIFICANT CHANGE UP (ref 2–14)
NEUTROPHILS # BLD AUTO: 3.3 K/UL — SIGNIFICANT CHANGE UP (ref 1.8–7.4)
NEUTROPHILS NFR BLD AUTO: 68 % — SIGNIFICANT CHANGE UP (ref 43–77)
NRBC # BLD: 0 /100 WBCS — SIGNIFICANT CHANGE UP (ref 0–0)
NRBC BLD-RTO: 0 /100 WBCS — SIGNIFICANT CHANGE UP (ref 0–0)
PHOSPHATE SERPL-MCNC: 2.9 MG/DL — SIGNIFICANT CHANGE UP (ref 2.5–4.5)
PLATELET # BLD AUTO: 316 K/UL — SIGNIFICANT CHANGE UP (ref 150–400)
POTASSIUM SERPL-MCNC: 4.8 MMOL/L — SIGNIFICANT CHANGE UP (ref 3.5–5.3)
POTASSIUM SERPL-SCNC: 4.8 MMOL/L — SIGNIFICANT CHANGE UP (ref 3.5–5.3)
PROT SERPL-MCNC: 6.7 G/DL — SIGNIFICANT CHANGE UP (ref 6–8.3)
RBC # BLD: 3.74 M/UL — LOW (ref 3.8–5.2)
RBC # FLD: 15.9 % — HIGH (ref 10.3–14.5)
SODIUM SERPL-SCNC: 135 MMOL/L — SIGNIFICANT CHANGE UP (ref 135–145)
TSH SERPL-MCNC: 0.45 UIU/ML — SIGNIFICANT CHANGE UP (ref 0.27–4.2)
VIT B12 SERPL-MCNC: 435 PG/ML — SIGNIFICANT CHANGE UP (ref 232–1245)
WBC # BLD: 4.86 K/UL — SIGNIFICANT CHANGE UP (ref 3.8–10.5)
WBC # FLD AUTO: 4.86 K/UL — SIGNIFICANT CHANGE UP (ref 3.8–10.5)

## 2024-10-25 PROCEDURE — 99233 SBSQ HOSP IP/OBS HIGH 50: CPT | Mod: GC

## 2024-10-25 RX ORDER — FOLIC ACID 1 MG/1
1 TABLET ORAL DAILY
Refills: 0 | Status: DISCONTINUED | OUTPATIENT
Start: 2024-10-25 | End: 2024-10-26

## 2024-10-25 RX ORDER — LORAZEPAM 4 MG/ML
1 VIAL (ML) INJECTION
Refills: 0 | Status: DISCONTINUED | OUTPATIENT
Start: 2024-10-25 | End: 2024-10-26

## 2024-10-25 RX ORDER — ENOXAPARIN SODIUM 100 MG/ML
30 INJECTION SUBCUTANEOUS EVERY 24 HOURS
Refills: 0 | Status: DISCONTINUED | OUTPATIENT
Start: 2024-10-25 | End: 2024-10-26

## 2024-10-25 RX ORDER — B1/B2/B3/B5/B6/B12/VIT C/FOLIC 500-0.5 MG
1 TABLET ORAL DAILY
Refills: 0 | Status: DISCONTINUED | OUTPATIENT
Start: 2024-10-25 | End: 2024-10-26

## 2024-10-25 RX ORDER — MAGNESIUM SULFATE 500 MG/ML
2 SYRINGE (ML) INJECTION ONCE
Refills: 0 | Status: COMPLETED | OUTPATIENT
Start: 2024-10-25 | End: 2024-10-25

## 2024-10-25 RX ORDER — IBUPROFEN 200 MG
200 TABLET ORAL THREE TIMES A DAY
Refills: 0 | Status: DISCONTINUED | OUTPATIENT
Start: 2024-10-25 | End: 2024-10-26

## 2024-10-25 RX ADMIN — Medication 1 TABLET(S): at 11:33

## 2024-10-25 RX ADMIN — ENOXAPARIN SODIUM 30 MILLIGRAM(S): 100 INJECTION SUBCUTANEOUS at 11:33

## 2024-10-25 RX ADMIN — Medication 200 MILLIGRAM(S): at 05:05

## 2024-10-25 RX ADMIN — PREDNISONE 5 MILLIGRAM(S): 20 TABLET ORAL at 22:45

## 2024-10-25 RX ADMIN — Medication 105 MILLIGRAM(S): at 18:46

## 2024-10-25 RX ADMIN — Medication 200 MILLIGRAM(S): at 22:45

## 2024-10-25 RX ADMIN — FOLIC ACID 1 MILLIGRAM(S): 1 TABLET ORAL at 11:33

## 2024-10-25 RX ADMIN — Medication 105 MILLIGRAM(S): at 11:33

## 2024-10-25 RX ADMIN — Medication 100 MILLIGRAM(S): at 11:33

## 2024-10-25 RX ADMIN — Medication 200 MILLIGRAM(S): at 04:05

## 2024-10-25 RX ADMIN — Medication 25 GRAM(S): at 07:52

## 2024-10-26 ENCOUNTER — INPATIENT (INPATIENT)
Facility: HOSPITAL | Age: 69
LOS: 33 days | Discharge: HOME | End: 2024-11-29
Attending: PSYCHIATRY & NEUROLOGY | Admitting: PSYCHIATRY & NEUROLOGY
Payer: MEDICARE

## 2024-10-26 VITALS
TEMPERATURE: 98 F | OXYGEN SATURATION: 100 % | RESPIRATION RATE: 18 BRPM | HEART RATE: 64 BPM | DIASTOLIC BLOOD PRESSURE: 72 MMHG | SYSTOLIC BLOOD PRESSURE: 122 MMHG

## 2024-10-26 DIAGNOSIS — Z98.89 OTHER SPECIFIED POSTPROCEDURAL STATES: Chronic | ICD-10-CM

## 2024-10-26 DIAGNOSIS — E43 UNSPECIFIED SEVERE PROTEIN-CALORIE MALNUTRITION: ICD-10-CM

## 2024-10-26 LAB
A1C WITH ESTIMATED AVERAGE GLUCOSE RESULT: 5.7 % — HIGH (ref 4–5.6)
ANION GAP SERPL CALC-SCNC: 7 MMOL/L — SIGNIFICANT CHANGE UP (ref 5–17)
BUN SERPL-MCNC: 30 MG/DL — HIGH (ref 7–23)
CALCIUM SERPL-MCNC: 8.8 MG/DL — SIGNIFICANT CHANGE UP (ref 8.4–10.5)
CHLORIDE SERPL-SCNC: 106 MMOL/L — SIGNIFICANT CHANGE UP (ref 96–108)
CHOLEST SERPL-MCNC: 158 MG/DL — SIGNIFICANT CHANGE UP
CO2 SERPL-SCNC: 22 MMOL/L — SIGNIFICANT CHANGE UP (ref 22–31)
CREAT SERPL-MCNC: 0.84 MG/DL — SIGNIFICANT CHANGE UP (ref 0.5–1.3)
EGFR: 75 ML/MIN/1.73M2 — SIGNIFICANT CHANGE UP
EGFR: 75 ML/MIN/1.73M2 — SIGNIFICANT CHANGE UP
ESTIMATED AVERAGE GLUCOSE: 117 MG/DL — HIGH (ref 68–114)
GLUCOSE SERPL-MCNC: 110 MG/DL — HIGH (ref 70–99)
HDLC SERPL-MCNC: 62 MG/DL — SIGNIFICANT CHANGE UP
LIPID PNL WITH DIRECT LDL SERPL: 85 MG/DL — SIGNIFICANT CHANGE UP
MAGNESIUM SERPL-MCNC: 2.1 MG/DL — SIGNIFICANT CHANGE UP (ref 1.6–2.6)
NON HDL CHOLESTEROL: 96 MG/DL — SIGNIFICANT CHANGE UP
PHOSPHATE SERPL-MCNC: 2.9 MG/DL — SIGNIFICANT CHANGE UP (ref 2.5–4.5)
POTASSIUM SERPL-MCNC: 4.9 MMOL/L — SIGNIFICANT CHANGE UP (ref 3.5–5.3)
POTASSIUM SERPL-SCNC: 4.9 MMOL/L — SIGNIFICANT CHANGE UP (ref 3.5–5.3)
SARS-COV-2 RNA SPEC QL NAA+PROBE: SIGNIFICANT CHANGE UP
SODIUM SERPL-SCNC: 135 MMOL/L — SIGNIFICANT CHANGE UP (ref 135–145)
TRIGL SERPL-MCNC: 54 MG/DL — SIGNIFICANT CHANGE UP
TSH SERPL-MCNC: 1.78 UIU/ML — SIGNIFICANT CHANGE UP (ref 0.27–4.2)

## 2024-10-26 PROCEDURE — 99239 HOSP IP/OBS DSCHRG MGMT >30: CPT | Mod: GC

## 2024-10-26 PROCEDURE — 99223 1ST HOSP IP/OBS HIGH 75: CPT

## 2024-10-26 RX ORDER — FOLIC ACID 1 MG/1
1 TABLET ORAL
Qty: 0 | Refills: 0 | DISCHARGE
Start: 2024-10-26

## 2024-10-26 RX ORDER — CHOLECALCIFEROL (VITAMIN D3) 10MCG/0.25
600 DROPS ORAL DAILY
Refills: 0 | Status: DISCONTINUED | OUTPATIENT
Start: 2024-10-26 | End: 2024-11-29

## 2024-10-26 RX ORDER — OLANZAPINE 20 MG/1
5 TABLET ORAL EVERY 8 HOURS
Refills: 0 | Status: DISCONTINUED | OUTPATIENT
Start: 2024-10-26 | End: 2024-11-29

## 2024-10-26 RX ORDER — GLUCOSAMINE SULFATE DIPOT CHLR 500 MG
1 CAPSULE ORAL DAILY
Refills: 0 | Status: DISCONTINUED | OUTPATIENT
Start: 2024-10-26 | End: 2024-11-29

## 2024-10-26 RX ORDER — PREDNISONE 20 MG/1
1 TABLET ORAL
Qty: 0 | Refills: 0 | DISCHARGE
Start: 2024-10-26

## 2024-10-26 RX ORDER — LORAZEPAM 2 MG/1
1 TABLET ORAL
Refills: 0 | Status: DISCONTINUED | OUTPATIENT
Start: 2024-10-26 | End: 2024-10-26

## 2024-10-26 RX ORDER — OLANZAPINE 20 MG/1
5 TABLET ORAL AT BEDTIME
Refills: 0 | Status: DISCONTINUED | OUTPATIENT
Start: 2024-10-26 | End: 2024-10-29

## 2024-10-26 RX ORDER — B1/B2/B3/B5/B6/B12/VIT C/FOLIC 500-0.5 MG
1 TABLET ORAL
Qty: 0 | Refills: 0 | DISCHARGE
Start: 2024-10-26

## 2024-10-26 RX ORDER — IBUPROFEN 200 MG
200 TABLET ORAL THREE TIMES A DAY
Refills: 0 | Status: DISCONTINUED | OUTPATIENT
Start: 2024-10-26 | End: 2024-11-29

## 2024-10-26 RX ORDER — LANOLIN ALCOHOL/MO/W.PET/CERES
100 CREAM (GRAM) TOPICAL DAILY
Refills: 0 | Status: DISCONTINUED | OUTPATIENT
Start: 2024-10-26 | End: 2024-11-29

## 2024-10-26 RX ORDER — MAGNESIUM, ALUMINUM HYDROXIDE 200-225/5
30 SUSPENSION, ORAL (FINAL DOSE FORM) ORAL EVERY 6 HOURS
Refills: 0 | Status: DISCONTINUED | OUTPATIENT
Start: 2024-10-26 | End: 2024-11-29

## 2024-10-26 RX ORDER — CYANOCOBALAMIN/FOLIC AC/VIT B6 1-2.2-25MG
1 TABLET ORAL DAILY
Refills: 0 | Status: DISCONTINUED | OUTPATIENT
Start: 2024-10-26 | End: 2024-11-29

## 2024-10-26 RX ORDER — DIVALPROEX SODIUM 500 MG
250 TABLET, DELAYED RELEASE (ENTERIC COATED) ORAL
Refills: 0 | Status: DISCONTINUED | OUTPATIENT
Start: 2024-10-26 | End: 2024-11-29

## 2024-10-26 RX ORDER — LORAZEPAM 4 MG/ML
1 VIAL (ML) INJECTION
Qty: 0 | Refills: 0 | DISCHARGE
Start: 2024-10-26

## 2024-10-26 RX ADMIN — Medication 200 MILLIGRAM(S): at 23:07

## 2024-10-26 RX ADMIN — Medication 200 MILLIGRAM(S): at 11:05

## 2024-10-26 RX ADMIN — Medication 100 MILLIGRAM(S): at 11:02

## 2024-10-26 RX ADMIN — Medication 250 MILLIGRAM(S): at 22:08

## 2024-10-26 RX ADMIN — OLANZAPINE 5 MILLIGRAM(S): 20 TABLET ORAL at 22:06

## 2024-10-26 RX ADMIN — Medication 200 MILLIGRAM(S): at 22:07

## 2024-10-26 RX ADMIN — FOLIC ACID 1 MILLIGRAM(S): 1 TABLET ORAL at 11:02

## 2024-10-26 RX ADMIN — Medication 200 MILLIGRAM(S): at 11:30

## 2024-10-26 RX ADMIN — Medication 1 TABLET(S): at 11:01

## 2024-10-26 RX ADMIN — Medication 600 UNIT(S): at 11:03

## 2024-10-26 NOTE — BH CHART NOTE - NSEVENTNOTEFT_PSY_ALL_CORE
On exam patient is calm, cooperative, pleasant. She arrived as a transfer from medicine. She denies SI/HI/AH/VH and tells me that she does not belong here. She has no acute concerns. She is admitted 9.27 and is appropriate for routine observation. Plan to start depakote 250mg BID and zyprexa 5mg qhs for psychosis and history of bipolar disorder. On exam patient is calm, cooperative, pleasant. She arrived as a transfer from medicine. She denies SI/HI/AH/VH and tells me that she does not belong here. She has no acute concerns. She is admitted 9.27 and is appropriate for routine observation. Plan to start depakote 250mg BID and zyprexa 5mg qhs for psychosis and history of bipolar disorder. We will also start CIWA protocol with Ativan. On exam patient is calm, cooperative, pleasant. She arrived as a transfer from medicine. She denies SI/HI/AH/VH and tells me that she does not belong here. She has no acute concerns. She is admitted 9.27. She is at low acute risk for suicide and violence, and is appropriate for routine observation. Plan to start depakote 250mg BID and zyprexa 5mg qhs for psychosis and history of bipolar disorder. We will also start CIWA protocol with Ativan.

## 2024-10-26 NOTE — BH PATIENT PROFILE - FALL HARM RISK - HARM RISK INTERVENTIONS

## 2024-10-26 NOTE — BH PATIENT PROFILE - HOME MEDICATIONS
cholecalciferol oral tablet , 600 unit(s) orally once a day  Multiple Vitamins oral tablet , 1 tab(s) orally once a day

## 2024-10-27 VITALS
DIASTOLIC BLOOD PRESSURE: 78 MMHG | SYSTOLIC BLOOD PRESSURE: 138 MMHG | OXYGEN SATURATION: 98 % | TEMPERATURE: 98 F | HEART RATE: 80 BPM

## 2024-10-27 DIAGNOSIS — F10.10 ALCOHOL ABUSE, UNCOMPLICATED: ICD-10-CM

## 2024-10-27 PROBLEM — M06.9 RHEUMATOID ARTHRITIS, UNSPECIFIED: Chronic | Status: ACTIVE | Noted: 2024-10-24

## 2024-10-27 PROCEDURE — 90792 PSYCH DIAG EVAL W/MED SRVCS: CPT

## 2024-10-27 RX ADMIN — Medication 200 MILLIGRAM(S): at 07:34

## 2024-10-27 RX ADMIN — Medication 200 MILLIGRAM(S): at 23:06

## 2024-10-27 RX ADMIN — Medication 600 UNIT(S): at 10:25

## 2024-10-27 RX ADMIN — OLANZAPINE 5 MILLIGRAM(S): 20 TABLET ORAL at 21:39

## 2024-10-27 RX ADMIN — Medication 250 MILLIGRAM(S): at 21:37

## 2024-10-27 RX ADMIN — Medication 1 TABLET(S): at 10:13

## 2024-10-27 RX ADMIN — Medication 1 MILLIGRAM(S): at 10:14

## 2024-10-27 RX ADMIN — Medication 200 MILLIGRAM(S): at 08:02

## 2024-10-27 RX ADMIN — Medication 100 MILLIGRAM(S): at 10:14

## 2024-10-27 NOTE — BH INPATIENT PSYCHIATRY ASSESSMENT NOTE - NSBHMSERECMEM_PSY_A_CORE
You can access the FollowMyHealth Patient Portal offered by [facility] by registering at the following website: [email or web address]. By joining [employer]’s FollowMyHealth portal, you will also be able to view your health information using other applications (apps) compatible with our system. Normal

## 2024-10-27 NOTE — BH INPATIENT PSYCHIATRY ASSESSMENT NOTE - NSBHPHYSICALEXAM_PSY_ALL_CORE
Constitutional: underweight, frail   ENT: no exudates  CV: regular rate and rhythm, no extra heart sounds  Pulm: CTA b/l, no rales wheezes or rhonchi  Extremities: contractures on hands and feet bilaterally

## 2024-10-27 NOTE — BH INPATIENT PSYCHIATRY ASSESSMENT NOTE - DESCRIPTION
Patient lives alone and has had APS involvement for 2+ years due to constantly forgetting to pay her bills for rent. Her daughter is her healthcare proxy and lives in [place].

## 2024-10-27 NOTE — BH INPATIENT PSYCHIATRY ASSESSMENT NOTE - NSBHROSSYSTEMS_PSY_ALL_CORE
Cannon Memorial Chelsea Murray  Mobile Phone: [phone]  Relation: Daughter  Preferred language: English   needed? No  Secondary Emergency Contact: Jimena Murray  Mobile Phone: [phone]  Relation: Friend  Preferred language: English   needed? No    Discharge Plan A: Skilled Nursing Facility  Discharge Plan B: Skilled Nursing Facility      [facility] [employer] [id number] [place] [id number] [address]  AT [facility]  [address] [address]  Phone: [phone] Fax: [phone]      Skilled Nursing Facility     Discharge Plan B Skilled Nursing Facility     DME Needed Upon Discharge  none     Discharge Plan discussed with: Patient;Adult children     Discharge Barriers Identified None                           Psychiatric

## 2024-10-27 NOTE — BH INPATIENT PSYCHIATRY ASSESSMENT NOTE - OTHER PAST PSYCHIATRIC HISTORY (INCLUDE DETAILS REGARDING ONSET, COURSE OF ILLNESS, INPATIENT/OUTPATIENT TREATMENT)
Diagnoses: bipolar disorder, depression (in 30s)   Inpatient: multiple in the last 10 years, last at [place] in 2020/2021

## 2024-10-27 NOTE — BH INPATIENT PSYCHIATRY ASSESSMENT NOTE - NSBHASSESSSUMMFT_PSY_ALL_CORE
Patient is a 69 year old AA female, single with adult daughter, domiciled alone previously with [facility] services, with PPH of bipolar disorder and depression per daughter and PMH of rheumatoid arthritis and lupus, who was referred by APS due to concern for poor self-care and was admitted to the medicine service for failure to thrive. Psychiatry was consulted for a psychiatric evaluation for bipolar disorder and patient was transferred to [facility] under involuntary status due to poor self-care, mood lability, and psychosis (bizarre delusion of being poisoned by TONEY [place]).     On evaluation, patient is significantly calmer and more cooperative today than yesterday; however, patient appears religiously preoccupied and hyperverbal today. Patient briefly appeared internally preoccupied and appeared to be speaking about being poisoned with medications with an unknown entity in the room as her gaze was focused on a specific area; however, she immediately minimized it. She presents very differently from yesterday though per daughter, patient may be attempting to feign normalcy to expedite discharge. Per collateral from daughter and APS worker supervisor, patient has demonstrated poor self-care that has deteriorated significantly in the last several months resulting in weight loss, disorganized behavior, an untenable living situation, and poor hygiene. Patient has been very irritable and angry for several years, and has a history of good medication effect when hospitalized but poor compliance in the community. Per daughter, patient has also reported auditory hallucinations recently, but has not done so previously. Patient's current presentation may be secondary to decompensation in bipolar disorder as patient's symptoms are slightly manic (hyperverbal, mood lability, Christian preoccupation) and psychotic (auditory hallucinations, paranoia). However, given longstanding history of poor self-care and marked change in behavior a decade ago, schizoaffective disorder is more likely at this time. Patient requires inpatient psychiatric admission as she is unable to care for herself in the setting of psychosis, poor insight and judgment, and medication non-compliance.    - Continue Depakote 250 mg QD and Depakote 500 mg QHS for mood stabilization  - Continue Zyprexa 5 mg QHS for psychosis, consider titrating as patient was previously on 15 mg   - Consider restarting methotrexate and hydrochloroquine (patient's previous home medications), discussed with Dr. Parrish; medicine consult for tomorrow AM   - Supplement meals with Ensure  - PT evaluation and treatment due to marked difficulty in ambulating requiring assistance  - 1:1 observation due to unsteady gait

## 2024-10-27 NOTE — BH INPATIENT PSYCHIATRY ASSESSMENT NOTE - NSBHMETABOLIC_PSY_ALL_CORE_FT
BMI: BMI (kg/m2): 16.5 (10-26-24 @ 22:06)  HbA1c: A1C with Estimated Average Glucose Result: 5.7 % (10-26-24 @ 22:00)    Glucose:   BP: --Vital Signs Last 24 Hrs  T(C): --  T(F): --  HR: --  BP: --  BP(mean): --  RR: --  SpO2: --      Lipid Panel: Date/Time: 10-26-24 @ 22:00  Cholesterol, Serum: 158  LDL Cholesterol Calculated: 85  HDL Cholesterol, Serum: 62  Total Cholesterol/HDL Ration Measurement: --  Triglycerides, Serum: 54

## 2024-10-27 NOTE — BH INPATIENT PSYCHIATRY ASSESSMENT NOTE - CURRENT MEDICATION
MEDICATIONS  (STANDING):  cholecalciferol 600 Unit(s) Oral daily  divalproex  milliGRAM(s) Oral two times a day  folic acid 1 milliGRAM(s) Oral daily  multivitamin 1 Tablet(s) Oral daily  OLANZapine 5 milliGRAM(s) Oral at bedtime  thiamine 100 milliGRAM(s) Oral daily    MEDICATIONS  (PRN):  aluminum hydroxide/magnesium hydroxide/simethicone Suspension 30 milliLiter(s) Oral every 6 hours PRN Dyspepsia  ibuprofen  Tablet. 200 milliGRAM(s) Oral three times a day PRN Moderate Pain (4 - 6)  LORazepam     Tablet 1 milliGRAM(s) Oral every 1 hour PRN CIWA-Ar score 8 or greater  OLANZapine 5 milliGRAM(s) Oral every 8 hours PRN agitation

## 2024-10-27 NOTE — BH INPATIENT PSYCHIATRY ASSESSMENT NOTE - VIOLENCE RISK FACTORS:
Feeling of being under threat and being unable to control threat/Substance abuse/Affective dysregulation/Impulsivity/Lack of insight into violence risk/need for treatment/Noncompliance with treatment

## 2024-10-27 NOTE — BH INPATIENT PSYCHIATRY ASSESSMENT NOTE - RISK ASSESSMENT
Patient is at low acute suicide risk as she denies any depressed mood or SI. She is at elevated risk for violence due to impulsivity and psychosis.

## 2024-10-27 NOTE — BH INPATIENT PSYCHIATRY ASSESSMENT NOTE - SELF INJURIOUS BEHAVIOR WITHOUT SUICIDAL INTENT:
Strength: [facility] plus Cantharone Plus Duration Text (Please Remove Duration From Postcare): The patient was instructed to leave the Cantharone Plus on for 6-8 hours and then wash the area well with soap and water. Curette Before Application?: No Medical Necessity Clause: This procedure was medically necessary because the lesions that were treated were: Consent: The patient's consent was obtained including but not limited to risks of crusting, scabbing, scarring, blistering, darker or lighter pigmentary change, recurrence, incomplete removal and infection. Canthacur Duration Text (Please Remove Duration From Postcare): The patient was instructed to leave the Canthacur on for 6-8 hours and then wash the area well with soap and water. Post-Care Instructions: I reviewed with the patient in detail post-care instructions. The patient understands that the treated areas should be washed off 3-4 hours after application. Curette Text: Prior to application of cantharidin the lesions were pared with a #15 blade Medical Necessity Information: It is in your best interest to select a reason for this procedure from the list below. All of these items fulfill various CMS LCD requirements except the new and changing color options. Detail Level: Detailed Canthacur Ps Duration Text (Please Remove Duration From Postcare): The patient was instructed to leave the Canthacur PS on for 6-8 hours and then wash the area well with soap and water. Cantharone Forte Duration Text (Please Remove Duration From Postcare): The patient was instructed to leave the Cantharone Forte on for 6-8 hours and then wash the area well with soap and water. Cantharone Duration Text (Please Remove Duration From Postcare): The patient was instructed to leave the Cantharone on for 6-8 hours and then wash the area well with soap and water. None known

## 2024-10-27 NOTE — BH INPATIENT PSYCHIATRY ASSESSMENT NOTE - HPI (INCLUDE ILLNESS QUALITY, SEVERITY, DURATION, TIMING, CONTEXT, MODIFYING FACTORS, ASSOCIATED SIGNS AND SYMPTOMS)
Patient is a 69 year old AA female, single with adult daughter, domiciled alone previously with [facility] services, with PPH of bipolar disorder and depression per daughter and PMH of rheumatoid arthritis and lupus, who was referred by APS due to concern for poor self-care and was admitted to the medicine service for failure to thrive. Psychiatry was consulted for a psychiatric evaluation for bipolar disorder and patient was transferred to [facility] under involuntary status due to poor self-care, mood lability, and psychosis (bizarre delusion of being poisoned by ghosts, ).     On evaluation, patient is sitting calmly in a chair and smiling. She states that she is feeling "much better" and attributes it to the medication that she took last night. She states that it feels like she "woke up from a nightmare" and wants to "leave it all behind." She is reluctant to discuss what that means; when writer addresses her thoughts of being poisoned by ghosts, she states that "it was part of that nightmare, I was hallucinating" and she does not wish to discuss further. She states that she is happy to stay here as long as she needs to to "get placement" and get the help she needs with her medications and her health. She asks for Ensure because she would like to gain weight. She reports sleeping well. She asks if she is able to shower. She makes several references to God throughout the evaluation, stating that "God told me" and "I prayed about what to do last night." She states that she is willing to continue taking medications, and is open to taking medications for her rheumatoid arthritis and lupus as they did help her previously. She states that she will only take them here as they could be poison and she will not take them at home. Her speech changes slightly while saying this and she sounds mildly angry while she stares at a specific point; when asked if she was seeing anything and who she was talking to, she responds "I was just thinking out loud." When writer redirected her, she explained that she would be willing to take medications and be monitored here before continuing at home. She looks at the PCA and states "You heard that? You weren't supposed to hear that" and winks at her. She adamantly denies any desire to harm herself or others. She asks slightly impatiently if writer has more questions for her as she wants to go to the bathroom; when writer tells her that she can go first and continue the evaluation later, she states that she would not want to continue afterward. She denies any auditory or visual hallucinations at this time. She states that she is willing to talk to her mother and daughter again, and states that even though she has not forgiven them, she is okay with them and would like to speak with them on the phone. She will consider letting her daughter visit depending on how she feels after talking to her on the phone.     Per discussion with RN Roman Valero, patient was calm yesterday after a discussion with her about her belongings; she was initially very preoccupied with them and was very irritable. However, after it was explained where they were going and she was given receipts, she was cooperative. She made several allusions to God and explained that she grew up Mu-ism, and attends [employer] every weekend.     Per collateral from daughter Abril Trinidad, patient is not Episcopal at all. She states that she was Episcopal as a child and her grandmother is Episcopal, but her mother is not. She states that her mother does not attend [employer] at all, and has not been Episcopal since adulthood. She states that her mother does not make references to God. She states that her mother is smart and knows what to say to get discharged.     Per writer's initial evaluation of patient yesterday:   Patient is a 69 year old AA female, single with adult daughter, domiciled alone previously with [facility] services, with PPH of bipolar disorder and depression per daughter and PMH of rheumatoid arthritis and lupus, who was referred by APS due to concern for poor self-care and is currently admitted for failure to thrive. Psychiatry was consulted for a psychiatric evaluation at the request of patient's daughter.     Writer spoke with APS worker supervisor Ms. Bergman for collateral on patient's home care needs. She states that patient has been in care with APS for "a long time" for financial management due to patient's history of bipolar disorder and medication non-compliance. She states that the APS worker visits patient once a month, and reported that patient has been getting worse for the last several months, eating less and not taking care of herself. She accompanied her on the last visit and found patient to be weak and her home to be in utter disarray. She states that there is nowhere to step on the floor in the home, and the home requires significant deep cleaning prior to a safe return. She also plans to get a HHA for patient, but cannot do so at this time due to the home situation. She states that she does not have more information but patient's APS worker can be reached on Monday for further collateral. She states that the stove was left on during the visit.     On initial evaluation, patient is lying in bed awake and angry. She states forcefully that she has no family and wants nothing to do with her daughter and her mother. She states that she did sign consent for a deep clean, but she wants to be present when it happens as she does not want APS stealing anything. She states that she wants to go home and does not want to engage in an evaluation. Writer asked why she is so angry with her daughter; she angrily responds "Ask her! She knows why" and refuses to tell writer. She gives permission for writer to speak with her daughter, stating "I don't care, you can talk to whoever you want. Just leave me alone."     Per collateral from patient's daughter Abril Trinidad, patient is calm and nice at baseline, but has been in a "manic state" for the last two and a half years since she stopped medications and has not been in treatment. She states that patient is angry and uses vulgar language around her and her children such that she cannot take her children to see her anymore. She states that patient has been like this for the past 10 years, and pinpoints 2015, when patient's relationship with her then-boyfriend ended, to be a trigger for her change. Patient was diagnosed with bipolar disorder and depression in her 30s but was calm and normal until 2015. Patient has been hospitalized at multiple hospitals, including [facility] and [facility], last at [facility] in 2020 or 2021, and does well on medications, but immediately stops taking medications once she goes home. She states that patient had an ACT team through [facility], but they closed her case as she refused to see them. She states that patient has never forgiven her after she called APS and patient was admitted to [facility] and her home underwent a deep cleaning. She states that patient always says that people are stealing from her and going into her home at night to steal. She states that on Friday, patient reported hearing voices of 2-3 people that they know "in the walls" of the apartment. She states that patient's home has required deep cleaning through APS annually for the last few years, but it has never been this bad. She is concerned for her mother as her mother has left the stove on several times and has lost significant weight; she states that patient is about 115 lbs at baseline. She states that patient also drinks daily and becomes very irritable and provocative such that she has been in several altercations, and she is worried for her safety. She states that last time patient was present during a deep clean, patient got angry after they removed 3 items and forced them to stop. She states that the apartment is the worst that she has ever seen it. Patient tells her "Don't step on my clean clothes" when she tries to walk in the apartment. She states that patient also frequently says "I want to die" but has never acted on it.     On re-evaluation, patient was able to be momentarily redirected when writer summarized what patient's daughter had said about her motive for anger. She responds "Wouldn't you be angry if that happened to you? See why I don't want them to contact me?" When writer explains that we don't have to talk about her family and instead focus on her, she responds "Thank you! I'm the patient, not them, so why does everyone always focus on what they say?" Writer inquired about her medical health and her appetite. She states that she has not been eating much because "they've been trying to poison me" and becomes angry again. When asked who, she responds "Ghosts!" When asked why they would be doing that, she responds irritably "I don't know" and says "I don't want to talk anymore. Leave me alone." She adds that she only wants to be hospitalized for exactly 5 days on her terms, and wants to sign paperwork to indicate that that will happen as she wants to leave when she is ready.

## 2024-10-27 NOTE — BH INPATIENT PSYCHIATRY ASSESSMENT NOTE - PAST PSYCHOTROPIC MEDICATION
Depakote 250 mg QD and 500 mg QHS, Zyprexa 15 mg QHS [id number], previously also Seroquel at unknown dosage

## 2024-10-28 DIAGNOSIS — M32.9 SYSTEMIC LUPUS ERYTHEMATOSUS, UNSPECIFIED: ICD-10-CM

## 2024-10-28 DIAGNOSIS — M06.9 RHEUMATOID ARTHRITIS, UNSPECIFIED: ICD-10-CM

## 2024-10-28 PROCEDURE — 99232 SBSQ HOSP IP/OBS MODERATE 35: CPT

## 2024-10-28 PROCEDURE — 99222 1ST HOSP IP/OBS MODERATE 55: CPT

## 2024-10-28 RX ADMIN — Medication 1 TABLET(S): at 10:11

## 2024-10-28 RX ADMIN — Medication 600 UNIT(S): at 10:12

## 2024-10-28 RX ADMIN — Medication 1 MILLIGRAM(S): at 10:11

## 2024-10-28 RX ADMIN — Medication 200 MILLIGRAM(S): at 02:24

## 2024-10-28 RX ADMIN — Medication 200 MILLIGRAM(S): at 18:06

## 2024-10-28 RX ADMIN — OLANZAPINE 5 MILLIGRAM(S): 20 TABLET ORAL at 22:27

## 2024-10-28 RX ADMIN — Medication 250 MILLIGRAM(S): at 10:11

## 2024-10-28 RX ADMIN — Medication 200 MILLIGRAM(S): at 10:20

## 2024-10-28 RX ADMIN — Medication 200 MILLIGRAM(S): at 00:06

## 2024-10-28 RX ADMIN — Medication 100 MILLIGRAM(S): at 10:11

## 2024-10-28 RX ADMIN — Medication 200 MILLIGRAM(S): at 01:24

## 2024-10-28 NOTE — BH INPATIENT PSYCHIATRY PROGRESS NOTE - NSBHMETABOLIC_PSY_ALL_CORE_FT
BMI: BMI (kg/m2): 16.5 (10-26-24 @ 22:06)  HbA1c: A1C with Estimated Average Glucose Result: 5.7 % (10-26-24 @ 22:00)    Glucose:   BP: 105/65 (10-28-24 @ 09:15) (105/65 - 138/78)Vital Signs Last 24 Hrs  T(C): 36.7 (10-28-24 @ 09:15), Max: 36.9 (10-27-24 @ 16:17)  T(F): 98 (10-28-24 @ 09:15), Max: 98.4 (10-27-24 @ 16:17)  HR: 75 (10-28-24 @ 09:15) (75 - 80)  BP: 105/65 (10-28-24 @ 09:15) (105/65 - 138/78)  BP(mean): --  RR: 19 (10-28-24 @ 09:15) (19 - 19)  SpO2: 97% (10-28-24 @ 09:15) (97% - 98%)      Lipid Panel: Date/Time: 10-26-24 @ 22:00  Cholesterol, Serum: 158  LDL Cholesterol Calculated: 85  HDL Cholesterol, Serum: 62  Total Cholesterol/HDL Ration Measurement: --  Triglycerides, Serum: 54   BMI: BMI (kg/m2): 16.5 (10-26-24 @ 22:06)  HbA1c: A1C with Estimated Average Glucose Result: 5.7 % (10-26-24 @ 22:00)    Glucose:   BP: 135/836 (10-28-24 @ 16:09) (105/65 - 138/78)Vital Signs Last 24 Hrs  T(C): 35.1 (10-28-24 @ 16:09), Max: 36.7 (10-28-24 @ 09:15)  T(F): 95.1 (10-28-24 @ 16:09), Max: 98 (10-28-24 @ 09:15)  HR: 85 (10-28-24 @ 16:09) (75 - 85)  BP: 135/836 (10-28-24 @ 16:09) (105/65 - 135/836)  BP(mean): --  RR: 18 (10-28-24 @ 16:09) (18 - 19)  SpO2: 99% (10-28-24 @ 16:09) (97% - 99%)      Lipid Panel: Date/Time: 10-26-24 @ 22:00  Cholesterol, Serum: 158  LDL Cholesterol Calculated: 85  HDL Cholesterol, Serum: 62  Total Cholesterol/HDL Ration Measurement: --  Triglycerides, Serum: 54
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
## 2024-10-28 NOTE — BH INPATIENT PSYCHIATRY PROGRESS NOTE - NSICDXBHSECONDARYDX_PSY_ALL_CORE
Alcohol abuse   F10.10   Alcohol abuse   F10.10  Rheumatoid arthritis   M06.9  Systemic lupus   M32.9

## 2024-10-28 NOTE — PHYSICAL THERAPY INITIAL EVALUATION ADULT - PREDICTED DURATION OF THERAPY (DAYS/WKS), PT EVAL
Pt. currently remains functionally independent including ambulation with RW and does not require further inpatient PT f/u at this time.

## 2024-10-28 NOTE — CONSULT NOTE ADULT - ATTENDING COMMENTS
69 y.o F w/ PMHx of bipolar d/o, SLE, rheumatoid arthritis w/ initial admission to medicine(10/24) for FTT, brought in by APS for concerns of poor self-care. Pt on no home meds for RA, bipolar or SLE despite being previously prescribed Plaquenil and methotrexate (for SLE) and depakote for Bipolar. Pt was transferred to inpatient psych ( 10/26)  under involuntary status due to poor self-care, mood lability, and psychosis (bizarre delusion of being poisoned by ghosts, ).     Pt interviewed with . Pt awake, alert, disorganized thoughts but gave us permission to speak to her daughter to verify her medications.     # SLE  # RA  Pt previously on plaquenil and methotrexate  - Will verify medication with her daughter   - CBC normal, no contraindication to resume methotrexate, will verify dosage from daughter  - resume Plaquenil 200mg PO daily, QTc  428ms   - outpt Rheum follow up     # Vit D def  Pt with Vit D def. Current Vit D at 25.5  - c/w 600U daily    # Bipolar d/o  Pt on depakote, restarted during inpatient admission  - care per primary team    Medicine will continue to follow, Thank you.   Dr. Kristian Caputo covering 10/29-10/31

## 2024-10-28 NOTE — BH SOCIAL WORK INITIAL PSYCHOSOCIAL EVALUATION - NSBHHOUSECOMMENTFT_PSY_ALL_CORE
Pt lives alone in a private apartment in [place]; discharge likely to daughter's apartment in [place], [place]

## 2024-10-28 NOTE — DIETITIAN INITIAL EVALUATION ADULT - ADD RECOMMEND
1. Continue Regular diet, recommend decrease Ensure to 3x/day   2. Encourage and monitor PO intake, honor preferences as able   3. Monitor wt trends, GI function, skin integrity  4. Monitor lytes, renal indices, blood glucose, LFTs    5. Pain and bowel regimen per team  1. Continue Regular diet, recommend decrease Ensure to 3x/day. Add magic cup BID. Continue MVI and thiamin supplementation. Allow for double portions as requested.   2. Encourage and monitor PO intake, honor preferences as able   3. Monitor wt trends, GI function, skin integrity  4. Monitor lytes, renal indices, blood glucose, LFTs    5. Pain and bowel regimen per team
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
## 2024-10-28 NOTE — DIETITIAN NUTRITION RISK NOTIFICATION - TREATMENT: THE FOLLOWING DIET HAS BEEN RECOMMENDED
1. Continue Regular diet, recommend decrease Ensure to 3x/day   2. Encourage and monitor PO intake, honor preferences as able   3. Monitor wt trends, GI function, skin integrity  4. Monitor lytes, renal indices, blood glucose, LFTs    5. Pain and bowel regimen per team  patient instructed; verbal instruction; Continued PO intake as tolerated; Importance of adequate kcal/protein intake for weight gain   1. Continue Regular diet, recommend decrease Ensure to 3x/day. Add magic cup BID. Continue MVI and thiamin supplementation.   2. Encourage and monitor PO intake, honor preferences as able   3. Monitor wt trends, GI function, skin integrity  4. Monitor lytes, renal indices, blood glucose, LFTs    5. Pain and bowel regimen per team  patient instructed; verbal instruction; Continued PO intake as tolerated; Importance of adequate kcal/protein intake for weight gain   1. Continue Regular diet, recommend decrease Ensure to 3x/day. Add magic cup BID. Continue MVI and thiamin supplementation. Allow for double portions as requested.   2. Encourage and monitor PO intake, honor preferences as able   3. Monitor wt trends, GI function, skin integrity  4. Monitor lytes, renal indices, blood glucose, LFTs    5. Pain and bowel regimen per team  patient instructed; verbal instruction; Continued PO intake as tolerated; Importance of adequate kcal/protein intake for weight gain

## 2024-10-28 NOTE — BH INPATIENT PSYCHIATRY PROGRESS NOTE - NSBHATTESTBILLING_PSY_A_CORE
74450-[id number] diagnostic evaluation with medical services [id number]-Emhassuoyd OBS or IP - moderate complexity OR 35-49 mins

## 2024-10-28 NOTE — CONSULT NOTE ADULT - SUBJECTIVE AND OBJECTIVE BOX
*** INCOMPLETE ***    Patient is a 69y old  Female who presents with a chief complaint of     HPI/HOSPITAL COURSE:  HPI:        INTERVAL EVENTS:    SUBJECTIVE HPI: Patient seen and examined at bedside. Patient resting comfortably, no complaints at this time. Patient denies: fever, chills, weakness, dizziness, headaches, changes in vision, chest pain, palpitations, shortness of breath, cough, N/V, diarrhea or constipation, dysuria, LE edema. ROS otherwise negative.      PAST MEDICAL & SURGICAL HISTORY:  Bipolar affective      Lupus      Anemia      Rheumatoid arthritis      H/O:  section          FAMILY HISTORY:      SOCIAL HISTORY:     -Cigarrettes:     -Alcohol:     -Ilicit Drug Use:    Home Medications:  cholecalciferol oral tablet: 600 unit(s) orally once a day (26 Oct 2024 15:20)  Multiple Vitamins oral tablet: 1 tab(s) orally once a day (26 Oct 2024 15:20)      MEDICATIONS  (STANDING):  cholecalciferol 600 Unit(s) Oral daily  divalproex  milliGRAM(s) Oral two times a day  folic acid 1 milliGRAM(s) Oral daily  multivitamin 1 Tablet(s) Oral daily  OLANZapine 5 milliGRAM(s) Oral at bedtime  thiamine 100 milliGRAM(s) Oral daily    MEDICATIONS  (PRN):  aluminum hydroxide/magnesium hydroxide/simethicone Suspension 30 milliLiter(s) Oral every 6 hours PRN Dyspepsia  ibuprofen  Tablet. 200 milliGRAM(s) Oral three times a day PRN Moderate Pain (4 - 6)  LORazepam     Tablet 1 milliGRAM(s) Oral every 1 hour PRN CIWA-Ar score 8 or greater  OLANZapine 5 milliGRAM(s) Oral every 8 hours PRN agitation      VITAL SIGNS:  Vital Signs Last 24 Hrs  T(C): 36.7 (28 Oct 2024 09:15), Max: 36.9 (27 Oct 2024 16:17)  T(F): 98 (28 Oct 2024 09:15), Max: 98.4 (27 Oct 2024 16:17)  HR: 75 (28 Oct 2024 09:15) (75 - 80)  BP: 105/65 (28 Oct 2024 09:15) (105/65 - 138/78)  BP(mean): --  RR: 19 (28 Oct 2024 09:15) (19 - 19)  SpO2: 97% (28 Oct 2024 09:15) (97% - 98%)    Parameters below as of 28 Oct 2024 09:15  Patient On (Oxygen Delivery Method): room air        I&O's Detail      PHYSICAL EXAM:  General: Comfortable, pleasant/anxious/agitated, Ill-appearing, well-nourished/frail/cachectic, comfortable / in distress  Neurological: AAOx3, no focal deficits  HEENT: NC/AT; EOMI, PERRL, clear conjunctiva, no nasal or oropharyngeal discharge or exudates, MMM  Neck: supple, no cervical or post-auricular lymphadenopathy  Cardiovascular: +S1/S2, no murmurs/rubs/gallops, RRR  Respiratory: CTA B/L, no diminished breath sounds, no wheezes/rales/rhonchi, no increased work of breathing or accessory muscle use  Gastrointestinal: soft, NT/ND; active BSx4 quadrants  Genitourinary: no suprapubic tenderness, no CVA tenderness  Extremities: WWP; no edema, clubbing or cyanosis  Vascular: 2+ radial, DP/PT pulses B/L  Skin: no rashes  Lines/Drains:     LABS:                  BNP            Microbiology:    Urinalysis with Rflx Culture (collected 10-24-24 @ 15:41)          RADIOLOGY & ADDITIONAL STUDIES: Reviewed. HPI:  69 y.o F w/ PMHx of bipolar d/o, SLE, rheumatoid arthritis w/ initial admission to medicine for FTT, brought in by APS. Pt on no home meds for RA, bipolar or SLE despite being previously prescribed Plaquenil and methotrexate (for SLE) and depakote for Bipolar. Pt was transferred to inpatient psych for further evaluation.    INTERVAL EVENTS:    SUBJECTIVE HPI: Patient seen and examined at bedside.Pt appears very agitated, using verbal profanity. ROS neg    PAST MEDICAL & SURGICAL HISTORY:  Bipolar affective      Lupus      Anemia      Rheumatoid arthritis      H/O:  section          FAMILY HISTORY:        Home Medications:  cholecalciferol oral tablet: 600 unit(s) orally once a day (26 Oct 2024 15:20)  Multiple Vitamins oral tablet: 1 tab(s) orally once a day (26 Oct 2024 15:20)      MEDICATIONS  (STANDING):  cholecalciferol 600 Unit(s) Oral daily  divalproex  milliGRAM(s) Oral two times a day  folic acid 1 milliGRAM(s) Oral daily  multivitamin 1 Tablet(s) Oral daily  OLANZapine 5 milliGRAM(s) Oral at bedtime  thiamine 100 milliGRAM(s) Oral daily    MEDICATIONS  (PRN):  aluminum hydroxide/magnesium hydroxide/simethicone Suspension 30 milliLiter(s) Oral every 6 hours PRN Dyspepsia  ibuprofen  Tablet. 200 milliGRAM(s) Oral three times a day PRN Moderate Pain (4 - 6)  LORazepam     Tablet 1 milliGRAM(s) Oral every 1 hour PRN CIWA-Ar score 8 or greater  OLANZapine 5 milliGRAM(s) Oral every 8 hours PRN agitation      VITAL SIGNS:  Vital Signs Last 24 Hrs  T(C): 36.7 (28 Oct 2024 09:15), Max: 36.9 (27 Oct 2024 16:17)  T(F): 98 (28 Oct 2024 09:15), Max: 98.4 (27 Oct 2024 16:17)  HR: 75 (28 Oct 2024 09:15) (75 - 80)  BP: 105/65 (28 Oct 2024 09:15) (105/65 - 138/78)  BP(mean): --  RR: 19 (28 Oct 2024 09:15) (19 - 19)  SpO2: 97% (28 Oct 2024 09:15) (97% - 98%)    Parameters below as of 28 Oct 2024 09:15  Patient On (Oxygen Delivery Method): room air        I&O's Detail      PHYSICAL EXAM:  Not done due to patient agitation    LABS:                  BNP            Microbiology:    Urinalysis with Rflx Culture (collected 10-24-24 @ 15:41)          RADIOLOGY & ADDITIONAL STUDIES: Reviewed.

## 2024-10-28 NOTE — PHYSICAL THERAPY INITIAL EVALUATION ADULT - PERTINENT HX OF CURRENT PROBLEM, REHAB EVAL
Pt. is a 69 y.o female initially admitted to medicine for FTT, who was subsequently transferred to [facility] on involuntary basis for management of psychosis, bipolar d/o.

## 2024-10-28 NOTE — CONSULT NOTE ADULT - ASSESSMENT
69 y.o F w/ PMHx of bipolar d/o, SLE, rheumatoid arthritis w/ initial admission to medicine for FTT, brought in by APS. Pt on no home meds for RA, bipolar or SLE despite being previously prescribed Plaquenil and methotrexate (for SLE) and depakote for Bipolar. Pt was transferred to inpatient psych for further evaluation.    # SLE  Pt previously on plaquenil and methotrexate. Currently WBC WNL, no contraindication from medical standpoint in restarting meds  - Confirm with family the home dose of plaquenil and methotrexate and okay to restart  - outpt Rheum follow up   - f/u daily labs upon initiation of meds    # Vit D def  Pt with Vit D def. Current Vit D at 25.5  - c/w 600U daily    # Bipolar d/o  Pt on depakote, restarted during inpatient admission  - care per primary team    Medicine will continue to follow

## 2024-10-28 NOTE — DIETITIAN INITIAL EVALUATION ADULT - PERSON TAUGHT/METHOD
Continued PO intake as tolerated; Importance of adequate kcal/protein intake for weight gain/verbal instruction/patient instructed

## 2024-10-28 NOTE — DIETITIAN INITIAL EVALUATION ADULT - PERTINENT LABORATORY DATA
A1C with Estimated Average Glucose Result: 5.7 % (10-26-24 @ 22:00)  A1C with Estimated Average Glucose Result: 5.5 % (10-25-24 @ 06:30)

## 2024-10-28 NOTE — DIETITIAN INITIAL EVALUATION ADULT - OTHER CALCULATIONS
pounds, %IBW 82%  Pt within % IBW thus actual body weight used for all calculations. Needs adjusted for advanced age and malnutrition.

## 2024-10-28 NOTE — BH INPATIENT PSYCHIATRY PROGRESS NOTE - OTHER
appears briefly internally preoccupied brief paranoia about being poisoned with medications  uses walker

## 2024-10-28 NOTE — BH INPATIENT PSYCHIATRY PROGRESS NOTE - NSBHASSESSSUMMFT_PSY_ALL_CORE
Patient is a 69 year old AA female, single with adult daughter, domiciled alone previously with [facility] services, with PPH of bipolar disorder and depression per daughter and PMH of rheumatoid arthritis and lupus, who was referred by APS due to concern for poor self-care and was admitted to the medicine service for failure to thrive. Psychiatry was consulted for a psychiatric evaluation for bipolar disorder and patient was transferred to [facility] under involuntary status due to poor self-care, mood lability, and psychosis (bizarre delusion of being poisoned by TONEY [place]).     On evaluation, patient is significantly calmer and more cooperative today than yesterday; however, patient appears religiously preoccupied and hyperverbal today. Patient briefly appeared internally preoccupied and appeared to be speaking about being poisoned with medications with an unknown entity in the room as her gaze was focused on a specific area; however, she immediately minimized it. She presents very differently from yesterday though per daughter, patient may be attempting to feign normalcy to expedite discharge. Per collateral from daughter and APS worker supervisor, patient has demonstrated poor self-care that has deteriorated significantly in the last several months resulting in weight loss, disorganized behavior, an untenable living situation, and poor hygiene. Patient has been very irritable and angry for several years, and has a history of good medication effect when hospitalized but poor compliance in the community. Per daughter, patient has also reported auditory hallucinations recently, but has not done so previously. Patient's current presentation may be secondary to decompensation in bipolar disorder as patient's symptoms are slightly manic (hyperverbal, mood lability, Anabaptist preoccupation) and psychotic (auditory hallucinations, paranoia). However, given longstanding history of poor self-care and marked change in behavior a decade ago, schizoaffective disorder is more likely at this time. Patient requires inpatient psychiatric admission as she is unable to care for herself in the setting of psychosis, poor insight and judgment, and medication non-compliance.    - Continue Depakote 250 mg QD and Depakote 500 mg QHS for mood stabilization  - Continue Zyprexa 5 mg QHS for psychosis, consider titrating as patient was previously on 15 mg   - Consider restarting methotrexate and hydrochloroquine (patient's previous home medications), discussed with Dr. Parrish; medicine consult for tomorrow AM   - Supplement meals with Ensure  - PT evaluation and treatment due to marked difficulty in ambulating requiring assistance  - 1:1 observation due to unsteady gait  Patient is a 69 year old AA female, single with adult daughter, domiciled alone previously with [facility] services, with PPH of bipolar disorder and depression per daughter and PMH of rheumatoid arthritis and lupus, who was referred by APS due to concern for poor self-care and was admitted to the medicine service for failure to thrive. Psychiatry was consulted for a psychiatric evaluation for bipolar disorder and patient was transferred to [facility] under involuntary status due to poor self-care, mood lability, and psychosis (bizarre delusion of being poisoned by TONE[place]).     On evaluation, patient is significantly calmer and more cooperative today than yesterday; however, patient appears religiously preoccupied and hyperverbal today. Patient briefly appeared internally preoccupied and appeared to be speaking about being poisoned with medications with an unknown entity in the room as her gaze was focused on a specific area; however, she immediately minimized it. She presents very differently from yesterday though per daughter, patient may be attempting to feign normalcy to expedite discharge. Per collateral from daughter and APS worker supervisor, patient has demonstrated poor self-care that has deteriorated significantly in the last several months resulting in weight loss, disorganized behavior, an untenable living situation, and poor hygiene. Patient has been very irritable and angry for several years, and has a history of good medication effect when hospitalized but poor compliance in the community. Per daughter, patient has also reported auditory hallucinations recently, but has not done so previously. Patient's current presentation may be secondary to decompensation in bipolar disorder as patient's symptoms are slightly manic (hyperverbal, mood lability, [employer] preoccupation) and psychotic (auditory hallucinations, paranoia). However, given longstanding history of poor self-care and marked change in behavior a decade ago, schizoaffective disorder is more likely at this time. Patient requires inpatient psychiatric admission as she is unable to care for herself in the setting of psychosis, poor insight and judgment, and medication non-compliance.    - Continue Depakote 250 mg QD and Depakote 500 mg QHS for mood stabilization  - Continue Zyprexa 5 mg QHS for psychosis, consider titrating as patient was previously on 15 mg   - Consider restarting methotrexate and hydrochloroquine (patient's previous home medications), discussed with Dr. Parrish; medicine consult for tomorrow AM   - Supplement meals with Ensure  - PT evaluation and treatment due to marked difficulty in ambulating requiring assistance  - 1:1 observation due to unsteady gait     10/28: Pt is "angry" at her daughter for "tricking" her into admission. Concurrently, pt claims to be happy about her admittance as she is hopeful she will receive the recourses she feels are necessary for her self-care. She admits to poor eating due to her malodorous home. She admits to prior AVH in the setting of visual hallucinations of zombies, "naked people, bugs and AH of people cussing and people in her wall at home "shooting up" with last presentation 2 days prior to evaluation. She states she is sleeping better since her arrival. She denies current SI/HI/AVH/PI.   MMSE: 23 - Mild Cognitive Impairment.   Ordered repeat UA and Cx after viewing abnormal results on 10/24 Patient is a 69 year old AA female, single with adult daughter, domiciled alone previously with [facility] services, with PPH of bipolar disorder and depression per daughter and PMH of rheumatoid arthritis and lupus, who was referred by APS due to concern for poor self-care and was admitted to the medicine service for failure to thrive. Psychiatry was consulted for a psychiatric evaluation for bipolar disorder and patient was transferred to [facility] under involuntary status due to poor self-care, mood lability, and psychosis (bizarre delusion of being poisoned by TONE[place]). On evaluation, patient is significantly calmer and more cooperative today than yesterday; however, patient appears religiously preoccupied and hyperverbal today. Patient briefly appeared internally preoccupied and appeared to be speaking about being poisoned with medications with an unknown entity in the room as her gaze was focused on a specific area; however, she immediately minimized it. She presents very differently from yesterday though per daughter, patient may be attempting to feign normalcy to expedite discharge. Per collateral from daughter and APS worker supervisor, patient has demonstrated poor self-care that has deteriorated significantly in the last several months resulting in weight loss, disorganized behavior, an untenable living situation, and poor hygiene. Patient has been very irritable and angry for several years, and has a history of good medication effect when hospitalized but poor compliance in the community. Per daughter, patient has also reported auditory hallucinations recently, but has not done so previously. Patient's current presentation may be secondary to decompensation in bipolar disorder as patient's symptoms are slightly manic (hyperverbal, mood lability, Adventist preoccupation) and psychotic (auditory hallucinations, paranoia). However, given longstanding history of poor self-care and marked change in behavior a decade ago, schizoaffective disorder is more likely at this time. Patient requires inpatient psychiatric admission as she is unable to care for herself in the setting of psychosis, poor insight and judgment, and medication non-compliance.    - Continue Depakote 250 mg QD and Depakote 500 mg QHS for mood stabilization  - Continue Zyprexa 5 mg QHS for psychosis, consider titrating as patient was previously on 15 mg   - Consider restarting methotrexate and hydrochloroquine (patient's previous home medications), discussed with Dr. Parrish; medicine consult for tomorrow AM   - Supplement meals with Ensure  - PT evaluation and treatment due to marked difficulty in ambulating requiring assistance  - 1:1 observation due to unsteady gait     10/28: Pt is "angry" at her daughter for "tricking" her into admission. Concurrently, pt claims to be happy about her admittance as she is hopeful she will receive the recourses she feels are necessary for her self-care. She admits to poor eating due to her malodorous home. She admits to prior AVH in the setting of visual hallucinations of zombies, "naked people, bugs and AH of people cussing and people in her wall at home "shooting up" with last presentation 2 days prior to evaluation. She states she is sleeping better since her arrival. She denies current SI/HI/AVH/PI.   MMSE: 23 - Mild Cognitive Impairment.   Ordered repeat UA and Cx after viewing abnormal results on 10/24

## 2024-10-28 NOTE — DIETITIAN INITIAL EVALUATION ADULT - OTHER INFO
69 year old AA female, single with adult daughter, domiciled alone previously with [facility], with PPH of bipolar disorder and depression per daughter and PMH of rheumatoid arthritis and lupus, who was referred by APS due to concern for poor self-care and was admitted to the medicine service for failure to thrive. Psychiatry was consulted for a psychiatric evaluation for bipolar disorder and patient was transferred to [facility] under involuntary status due to poor self-care, mood lability, and psychosis (bizarre delusion of being poisoned by ghosts, ).     Patient seen for nutrition assessment. Admitted to [facility] for FTT. At present, pt endorses good appetite and intake. Consumed 3 Khmer toast, 2 cornflakes, 3 milk, and turkey sausage for breakfast this AM. Per prior RD assessment, pt noted with decreased PO intake x 4 months in setting of stress. Reports usual body weight of 100-105 pounds. Current dosing weight: 90 pounds, BMI 16.5. -15 pounds/14% x 4 months, clinically significant. NFPE notable for severe muscle and fat wasting to temples, clavicles, and buccals. Based on ASPEN guidelines, pt meets criteria for severe protein calorie malnutrition- risk note placed. Labs and medications reviewed. Electrolytes WNL, BUN 30<H>, glucose 110<H>, HgbA1c 5.7% (10/26/24). Ordered for vitamin D, folate, MVI, thiamin. Skin: No pressure injuries or edema documented, Sahil score 19. Skin: No report of nausea, vomiting, diarrhea, constipation; last BM 10/26 per flowsheets. No known food allergies documented, no difficulty chewing/swallowing reported. See nutrition recommendations below.

## 2024-10-28 NOTE — BH INPATIENT PSYCHIATRY PROGRESS NOTE - NSBHCHARTREVIEWVS_PSY_A_CORE FT
Vital Signs Last 24 Hrs  T(C): 36.7 (10-28-24 @ 09:15), Max: 36.9 (10-27-24 @ 16:17)  T(F): 98 (10-28-24 @ 09:15), Max: 98.4 (10-27-24 @ 16:17)  HR: 75 (10-28-24 @ 09:15) (75 - 80)  BP: 105/65 (10-28-24 @ 09:15) (105/65 - 138/78)  BP(mean): --  RR: 19 (10-28-24 @ 09:15) (19 - 19)  SpO2: 97% (10-28-24 @ 09:15) (97% - 98%)     Vital Signs Last 24 Hrs  T(C): 35.1 (10-28-24 @ 16:09), Max: 36.7 (10-28-24 @ 09:15)  T(F): 95.1 (10-28-24 @ 16:09), Max: 98 (10-28-24 @ 09:15)  HR: 85 (10-28-24 @ 16:09) (75 - 85)  BP: 135/836 (10-28-24 @ 16:09) (105/65 - 135/836)  BP(mean): --  RR: 18 (10-28-24 @ 16:09) (18 - 19)  SpO2: 99% (10-28-24 @ 16:09) (97% - 99%)

## 2024-10-29 DIAGNOSIS — E43 UNSPECIFIED SEVERE PROTEIN-CALORIE MALNUTRITION: ICD-10-CM

## 2024-10-29 PROCEDURE — 99232 SBSQ HOSP IP/OBS MODERATE 35: CPT

## 2024-10-29 RX ORDER — OLANZAPINE 20 MG/1
7.5 TABLET ORAL AT BEDTIME
Refills: 0 | Status: DISCONTINUED | OUTPATIENT
Start: 2024-10-29 | End: 2024-10-30

## 2024-10-29 RX ORDER — HYDROXYCHLOROQUINE SULFATE 200 MG/1
200 TABLET, FILM COATED ORAL
Refills: 0 | Status: DISCONTINUED | OUTPATIENT
Start: 2024-10-29 | End: 2024-11-11

## 2024-10-29 RX ORDER — METHOTREXATE 2.5 MG/1
7.5 TABLET ORAL
Refills: 0 | Status: DISCONTINUED | OUTPATIENT
Start: 2024-10-29 | End: 2024-11-29

## 2024-10-29 RX ADMIN — Medication 200 MILLIGRAM(S): at 15:04

## 2024-10-29 RX ADMIN — Medication 250 MILLIGRAM(S): at 22:36

## 2024-10-29 RX ADMIN — Medication 200 MILLIGRAM(S): at 14:04

## 2024-10-29 NOTE — BH INPATIENT PSYCHIATRY PROGRESS NOTE - NSICDXBHSECONDARYDX_PSY_ALL_CORE
Alcohol abuse   F10.10  Rheumatoid arthritis   M06.9  Systemic lupus   M32.9   Alcohol abuse   F10.10  Rheumatoid arthritis   M06.9  Systemic lupus   M32.9  Severe protein-calorie malnutrition   E43

## 2024-10-29 NOTE — PROGRESS NOTE ADULT - ASSESSMENT
69 y.o F w/ PMHx of bipolar d/o, SLE, rheumatoid arthritis w/ initial admission to medicine for FTT, brought in by APS. Pt on no home meds for RA, bipolar or SLE despite being previously prescribed Plaquenil and methotrexate (for SLE) and depakote for Bipolar. Pt was transferred to inpatient psych for further evaluation.    # SLE  Pt previously on plaquenil and methotrexate. Currently WBC WNL, no contraindication from medical standpoint in restarting meds  - Confirm with family the home dose of plaquenil and methotrexate and restart these medications (Daughter [phone])?  - outpt Rheum follow up   - f/u daily labs upon initiation of meds    # Vit D def  Pt with Vit D def. Current Vit D at 25.5  - c/w 600U daily    # Bipolar d/o  Pt on depakote, restarted during inpatient admission  - care per primary team    Medicine will continue to follow

## 2024-10-29 NOTE — BH INPATIENT PSYCHIATRY PROGRESS NOTE - NSBHFUPINTERVALHXFT_PSY_A_CORE
On presentation, patient is found in bed placing lotion on her feet. She appears euthymic and pleasant throughout conversation although she does continue to have circumstantial speech. She continues to bring up the gospel and praising God for his help throughout conversation. Pt states she is happy to be here and receive care. She admits to sleeping well, sleeping 4 hours, and states this is a great improvement from how she was sleeping at home. She admits she was unable to sleep most of the time, and was only sleeping in small bouts. Though she slept well, she admits she often wakes up due to pain from her Rheumatoid Arthritis. She states the ibuprofen prescribed helps but unfortunately wares off, causing a return of symptoms while asleep. Pt admits to eating well and is able to list multiple meals she has consumed since her admission. When inquired about her comments regarding medication non-compliance after discharge, she states she does not want that poison in her system. She explains she doesn't want to use the medication she has at home because it is  but states with a new prescription she will take the medication when she deems it necessary. 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 When explained about the importance of her health, she responded explaining the tasks she does for herself on her own, including purchasing healthy foods such as vitamin D and weight lionel. Pt pauses after speaking for a long period of time and reflects on her ability to continue "babbling." She admits her "talking so much" causes her to have throat pain and is hopeful her daughter brings her lozenges during visiting hours. When inquired, the pt admits to a pleasant conversation with her daughter yesterday where her daughter apologized. She admits to feeling pleased after hearing these statements. She also states that although she was incapable of speaking to her mother, her daughter spoke with her and she was pleased to learn her mother was happy as well. Pt reflects on an issue with a patient that was briefly housed in the same room as her. She laughs and states "I was a bad girl yesterday" before becoming agitated, stating the pt was prejudice and that she had to defend herself. Pt goes on to discuss a similar instance with her pharmacist of multiple years, and asks that the conversation terminate as she was becoming agitated and wanted to calm down. Pt denies SI/HI/AVH or PI.

## 2024-10-29 NOTE — BH INPATIENT PSYCHIATRY PROGRESS NOTE - NSBHATTESTCOMMENTATTENDFT_PSY_A_CORE
I was present for interaction and agree with notation I agree with notation. I also saw patient, noted her to be intense, irritable and labile, frequently cursing and using foul speech, sarcastic. MAR reviewed and she refused all morning meds today as well as depakote last night. Will plan to further titrate zyprexa to 7.5mg qhs. Confirm with family her plaquenil and methotrexate doses and restart. Medicine consult continues to follow and recs are appreciated.

## 2024-10-29 NOTE — BH CHART NOTE - NSEVENTNOTEFT_PSY_ALL_CORE
Writer spoke with pt's daughter, Abril Trinidad, to discuss Plaquenil and Methotrexate doses. Per Abril, mother has not been to her PCP or compliant with medication in a long period of time. She provided the information to pt's pharmacist - Dr. Landon [phone].     Writer spoke with Dr. Landon and learned that the patient had not received medication since 2023, when she took prednisone. The information for Plaquenil and Methotrexate dose are as follows:    Plaquenil   - last picked up in 2022   - 200 mg PO BID    Methotrexate  - last picked up in 2021  - 3 2.5 mg tablets (total 7.5 mg) once a week

## 2024-10-29 NOTE — BH INPATIENT PSYCHIATRY PROGRESS NOTE - NSBHMETABOLIC_PSY_ALL_CORE_FT
BMI: BMI (kg/m2): 17.4 (10-29-24 @ 09:01)  HbA1c: A1C with Estimated Average Glucose Result: 5.7 % (10-26-24 @ 22:00)    Glucose:   BP: 112/71 (10-29-24 @ 09:01) (105/65 - 138/78)Vital Signs Last 24 Hrs  T(C): 36.5 (10-29-24 @ 09:01), Max: 36.5 (10-29-24 @ 09:01)  T(F): 97.7 (10-29-24 @ 09:01), Max: 97.7 (10-29-24 @ 09:01)  HR: 79 (10-29-24 @ 09:01) (79 - 85)  BP: 112/71 (10-29-24 @ 09:01) (112/71 - 135/836)  BP(mean): --  RR: 18 (10-28-24 @ 16:09) (18 - 18)  SpO2: 99% (10-29-24 @ 09:01) (99% - 99%)      Lipid Panel: Date/Time: 10-26-24 @ 22:00  Cholesterol, Serum: 158  LDL Cholesterol Calculated: 85  HDL Cholesterol, Serum: 62  Total Cholesterol/HDL Ration Measurement: --  Triglycerides, Serum: 54

## 2024-10-29 NOTE — BH INPATIENT PSYCHIATRY PROGRESS NOTE - NSBHCHARTREVIEWVS_PSY_A_CORE FT
Vital Signs Last 24 Hrs  T(C): 36.5 (10-29-24 @ 09:01), Max: 36.5 (10-29-24 @ 09:01)  T(F): 97.7 (10-29-24 @ 09:01), Max: 97.7 (10-29-24 @ 09:01)  HR: 79 (10-29-24 @ 09:01) (79 - 85)  BP: 112/71 (10-29-24 @ 09:01) (112/71 - 135/836)  BP(mean): --  RR: 18 (10-28-24 @ 16:09) (18 - 18)  SpO2: 99% (10-29-24 @ 09:01) (99% - 99%)

## 2024-10-29 NOTE — BH INPATIENT PSYCHIATRY PROGRESS NOTE - NSBHASSESSSUMMFT_PSY_ALL_CORE
Patient is a 69 year old AA female, single with adult daughter, domiciled alone previously with [facility] services, with PPH of bipolar disorder and depression per daughter and PMH of rheumatoid arthritis and lupus, who was referred by APS due to concern for poor self-care and was admitted to the medicine service for failure to thrive. Psychiatry was consulted for a psychiatric evaluation for bipolar disorder and patient was transferred to [facility] under involuntary status due to poor self-care, mood lability, and psychosis (bizarre delusion of being poisoned by TONEY newton). On evaluation, patient is significantly calmer and more cooperative today than yesterday; however, patient appears religiously preoccupied and hyperverbal today. Patient briefly appeared internally preoccupied and appeared to be speaking about being poisoned with medications with an unknown entity in the room as her gaze was focused on a specific area; however, she immediately minimized it. She presents very differently from yesterday though per daughter, patient may be attempting to feign normalcy to expedite discharge. Per collateral from daughter and APS worker supervisor, patient has demonstrated poor self-care that has deteriorated significantly in the last several months resulting in weight loss, disorganized behavior, an untenable living situation, and poor hygiene. Patient has been very irritable and angry for several years, and has a history of good medication effect when hospitalized but poor compliance in the community. Per daughter, patient has also reported auditory hallucinations recently, but has not done so previously. Patient's current presentation may be secondary to decompensation in bipolar disorder as patient's symptoms are slightly manic (hyperverbal, mood lability, [place] preoccupation) and psychotic (auditory hallucinations, paranoia). However, given longstanding history of poor self-care and marked change in behavior a decade ago, schizoaffective disorder is more likely at this time. Patient requires inpatient psychiatric admission as she is unable to care for herself in the setting of psychosis, poor insight and judgment, and medication non-compliance.    - Continue Depakote 250 mg QD and Depakote 500 mg QHS for mood stabilization  - Continue Zyprexa 5 mg QHS for psychosis, consider titrating as patient was previously on 15 mg   - Consider restarting methotrexate and hydroxychlorquine (patient's previous home medications), discussed with Dr. Parrish; medicine consult for tomorrow AM   - Supplement meals with Ensure  - PT evaluation and treatment due to marked difficulty in ambulating requiring assistance  - 1:1 observation due to unsteady gait     10/28: Pt is "angry" at her daughter for "tricking" her into admission. Concurrently, pt claims to be happy about her admittance as she is hopeful she will receive the recourses she feels are necessary for her self-care. She admits to poor eating due to her malodorous home. She admits to prior AVH in the setting of visual hallucinations of zombies, "naked people, bugs and AH of people cussing and people in her wall at home "shooting up" with last presentation 2 days prior to evaluation. She states she is sleeping better since her arrival. She denies current SI/HI/AVH/PI.   MMSE: 23 - Mild Cognitive Impairment.   Ordered repeat UA and Cx after viewing abnormal results on 10/24  10/29: Pt is "happy" to be here and get the care she needs. She continues to have circumstantial thought but appears to be aware of this. She admits to eating and sleeping well stating she is eating and sleeping the best she has in some time. She admits to having an issue with a pt but states she is happy to move on from it. She denies SI/HI/AVH or PI.  Patient is a 69 year old AA female, single with adult daughter, domiciled alone previously with [facility] services, with PPH of bipolar disorder and depression per daughter and PMH of rheumatoid arthritis and lupus, who was referred by APS due to concern for poor self-care and was admitted to the medicine service for failure to thrive. Psychiatry was consulted for a psychiatric evaluation for bipolar disorder and patient was transferred to [facility] under involuntary status due to poor self-care, mood lability, and psychosis (bizarre delusion of being poisoned by TONE[place] new[place]). On evaluation, patient is significantly calmer and more cooperative today than yesterday; however, patient appears religiously preoccupied and hyperverbal today. Patient briefly appeared internally preoccupied and appeared to be speaking about being poisoned with medications with an unknown entity in the room as her gaze was focused on a specific area; however, she immediately minimized it. She presents very differently from yesterday though per daughter, patient may be attempting to feign normalcy to expedite discharge. Per collateral from daughter and APS worker supervisor, patient has demonstrated poor self-care that has deteriorated significantly in the last several months resulting in weight loss, disorganized behavior, an untenable living situation, and poor hygiene. Patient has been very irritable and angry for several years, and has a history of good medication effect when hospitalized but poor compliance in the community. Per daughter, patient has also reported auditory hallucinations recently, but has not done so previously. Patient's current presentation may be secondary to decompensation in bipolar disorder as patient's symptoms are slightly manic (hyperverbal, mood lability, Congregation preoccupation) and psychotic (auditory hallucinations, paranoia). However, given longstanding history of poor self-care and marked change in behavior a decade ago, schizoaffective disorder is more likely at this time. Patient requires inpatient psychiatric admission as she is unable to care for herself in the setting of psychosis, poor insight and judgment, and medication non-compliance.    - Continue Depakote 250 bid for mood stabilization  - Increase Zyprexa to 7.5mg QHS for psychosis, consider titrating as patient was previously on 15 mg   - Consider restarting methotrexate and hydroxychlorquine (patient's previous home medications), discussed with Dr. Parrish; medicine consult for tomorrow AM   - Supplement meals with Ensure  - PT evaluation and treatment due to marked difficulty in ambulating requiring assistance  - 1:1 observation discontinued    10/28: Pt is "angry" at her daughter for "tricking" her into admission. Concurrently, pt claims to be happy about her admittance as she is hopeful she will receive the recourses she feels are necessary for her self-care. She admits to poor eating due to her malodorous home. She admits to prior AVH in the setting of visual hallucinations of zombies, "naked people, bugs and AH of people cussing and people in her wall at home "shooting up" with last presentation 2 days prior to evaluation. She states she is sleeping better since her arrival. She denies current SI/HI/AVH/PI.   MMSE: 23 - Mild Cognitive Impairment.   Ordered repeat UA and Cx after viewing abnormal results on 10/24  10/29: Pt is "happy" to be here and get the care she needs. She continues to have circumstantial thought but appears to be aware of this. She admits to eating and sleeping well stating she is eating and sleeping the best she has in some time. She admits to having an issue with a pt but states she is happy to move on from it. She denies SI/HI/AVH or PI.

## 2024-10-30 LAB
ALBUMIN SERPL ELPH-MCNC: 3.5 G/DL — SIGNIFICANT CHANGE UP (ref 3.3–5)
ALP SERPL-CCNC: 123 U/L — HIGH (ref 40–120)
ALT FLD-CCNC: 14 U/L — SIGNIFICANT CHANGE UP (ref 10–45)
ANION GAP SERPL CALC-SCNC: 8 MMOL/L — SIGNIFICANT CHANGE UP (ref 5–17)
APPEARANCE UR: CLEAR — SIGNIFICANT CHANGE UP
AST SERPL-CCNC: 25 U/L — SIGNIFICANT CHANGE UP (ref 10–40)
BILIRUB SERPL-MCNC: <0.2 MG/DL — SIGNIFICANT CHANGE UP (ref 0.2–1.2)
BILIRUB UR-MCNC: NEGATIVE — SIGNIFICANT CHANGE UP
BUN SERPL-MCNC: 32 MG/DL — HIGH (ref 7–23)
CALCIUM SERPL-MCNC: 9 MG/DL — SIGNIFICANT CHANGE UP (ref 8.4–10.5)
CHLORIDE SERPL-SCNC: 105 MMOL/L — SIGNIFICANT CHANGE UP (ref 96–108)
CO2 SERPL-SCNC: 26 MMOL/L — SIGNIFICANT CHANGE UP (ref 22–31)
COLOR SPEC: YELLOW — SIGNIFICANT CHANGE UP
CREAT SERPL-MCNC: 0.63 MG/DL — SIGNIFICANT CHANGE UP (ref 0.5–1.3)
DIFF PNL FLD: NEGATIVE — SIGNIFICANT CHANGE UP
EGFR: 96 ML/MIN/1.73M2 — SIGNIFICANT CHANGE UP
GLUCOSE SERPL-MCNC: 106 MG/DL — HIGH (ref 70–99)
GLUCOSE UR QL: NEGATIVE MG/DL — SIGNIFICANT CHANGE UP
HCT VFR BLD CALC: 32.8 % — LOW (ref 34.5–45)
HGB BLD-MCNC: 10.2 G/DL — LOW (ref 11.5–15.5)
KETONES UR-MCNC: NEGATIVE MG/DL — SIGNIFICANT CHANGE UP
LEUKOCYTE ESTERASE UR-ACNC: NEGATIVE — SIGNIFICANT CHANGE UP
MCHC RBC-ENTMCNC: 28.1 PG — SIGNIFICANT CHANGE UP (ref 27–34)
MCHC RBC-ENTMCNC: 31.1 G/DL — LOW (ref 32–36)
MCV RBC AUTO: 90.4 FL — SIGNIFICANT CHANGE UP (ref 80–100)
NITRITE UR-MCNC: NEGATIVE — SIGNIFICANT CHANGE UP
NRBC # BLD: 0 /100 WBCS — SIGNIFICANT CHANGE UP (ref 0–0)
PH UR: 6 — SIGNIFICANT CHANGE UP (ref 5–8)
PLATELET # BLD AUTO: 335 K/UL — SIGNIFICANT CHANGE UP (ref 150–400)
POTASSIUM SERPL-MCNC: 4.5 MMOL/L — SIGNIFICANT CHANGE UP (ref 3.5–5.3)
POTASSIUM SERPL-SCNC: 4.5 MMOL/L — SIGNIFICANT CHANGE UP (ref 3.5–5.3)
PROT SERPL-MCNC: 7.1 G/DL — SIGNIFICANT CHANGE UP (ref 6–8.3)
PROT UR-MCNC: NEGATIVE MG/DL — SIGNIFICANT CHANGE UP
RBC # BLD: 3.63 M/UL — LOW (ref 3.8–5.2)
RBC # FLD: 17.1 % — HIGH (ref 10.3–14.5)
SODIUM SERPL-SCNC: 139 MMOL/L — SIGNIFICANT CHANGE UP (ref 135–145)
SP GR SPEC: 1.02 — SIGNIFICANT CHANGE UP (ref 1–1.03)
UROBILINOGEN FLD QL: 0.2 MG/DL — SIGNIFICANT CHANGE UP (ref 0.2–1)
WBC # BLD: 5.67 K/UL — SIGNIFICANT CHANGE UP (ref 3.8–10.5)
WBC # FLD AUTO: 5.67 K/UL — SIGNIFICANT CHANGE UP (ref 3.8–10.5)

## 2024-10-30 PROCEDURE — 99232 SBSQ HOSP IP/OBS MODERATE 35: CPT

## 2024-10-30 RX ORDER — OLANZAPINE 20 MG/1
10 TABLET ORAL AT BEDTIME
Refills: 0 | Status: DISCONTINUED | OUTPATIENT
Start: 2024-10-30 | End: 2024-11-29

## 2024-10-30 RX ADMIN — Medication 1 TABLET(S): at 10:41

## 2024-10-30 RX ADMIN — METHOTREXATE 7.5 MILLIGRAM(S): 2.5 TABLET ORAL at 13:55

## 2024-10-30 RX ADMIN — Medication 1 MILLIGRAM(S): at 10:41

## 2024-10-30 RX ADMIN — HYDROXYCHLOROQUINE SULFATE 200 MILLIGRAM(S): 200 TABLET, FILM COATED ORAL at 10:41

## 2024-10-30 RX ADMIN — Medication 200 MILLIGRAM(S): at 03:41

## 2024-10-30 RX ADMIN — Medication 100 MILLIGRAM(S): at 10:41

## 2024-10-30 RX ADMIN — Medication 600 UNIT(S): at 10:40

## 2024-10-30 RX ADMIN — Medication 250 MILLIGRAM(S): at 10:41

## 2024-10-30 NOTE — BH INPATIENT PSYCHIATRY PROGRESS NOTE - NSBHASSESSSUMMFT_PSY_ALL_CORE
Patient is a 69 year old AA female, single with adult daughter, domiciled alone previously with [facility] services, with PPH of bipolar disorder and depression per daughter and PMH of rheumatoid arthritis and lupus, who was referred by APS due to concern for poor self-care and was admitted to the medicine service for failure to thrive. Psychiatry was consulted for a psychiatric evaluation for bipolar disorder and patient was transferred to [facility] under involuntary status due to poor self-care, mood lability, and psychosis (bizarre delusion of being poisoned by TONE[place] new[place]). On evaluation, patient is significantly calmer and more cooperative today than yesterday; however, patient appears religiously preoccupied and hyperverbal today. Patient briefly appeared internally preoccupied and appeared to be speaking about being poisoned with medications with an unknown entity in the room as her gaze was focused on a specific area; however, she immediately minimized it. She presents very differently from yesterday though per daughter, patient may be attempting to feign normalcy to expedite discharge. Per collateral from daughter and APS worker supervisor, patient has demonstrated poor self-care that has deteriorated significantly in the last several months resulting in weight loss, disorganized behavior, an untenable living situation, and poor hygiene. Patient has been very irritable and angry for several years, and has a history of good medication effect when hospitalized but poor compliance in the community. Per daughter, patient has also reported auditory hallucinations recently, but has not done so previously. Patient's current presentation may be secondary to decompensation in bipolar disorder as patient's symptoms are slightly manic (hyperverbal, mood lability, Hindu preoccupation) and psychotic (auditory hallucinations, paranoia). However, given longstanding history of poor self-care and marked change in behavior a decade ago, schizoaffective disorder is more likely at this time. Patient requires inpatient psychiatric admission as she is unable to care for herself in the setting of psychosis, poor insight and judgment, and medication non-compliance.    - Continue Depakote 250 bid for mood stabilization  - Increase Zyprexa to 7.5mg QHS for psychosis, consider titrating as patient was previously on 15 mg   - Consider restarting methotrexate and hydroxychlorquine (patient's previous home medications), discussed with Dr. Parrish; medicine consult for tomorrow AM   - Supplement meals with Ensure  - PT evaluation and treatment due to marked difficulty in ambulating requiring assistance  - 1:1 observation discontinued    10/28: Pt is "angry" at her daughter for "tricking" her into admission. Concurrently, pt claims to be happy about her admittance as she is hopeful she will receive the recourses she feels are necessary for her self-care. She admits to poor eating due to her malodorous home. She admits to prior AVH in the setting of visual hallucinations of zombies, "naked people, bugs and AH of people cussing and people in her wall at home "shooting up" with last presentation 2 days prior to evaluation. She states she is sleeping better since her arrival. She denies current SI/HI/AVH/PI.   MMSE: 23 - Mild Cognitive Impairment.   Ordered repeat UA and Cx after viewing abnormal results on 10/24  10/29: Pt is "happy" to be here and get the care she needs. She continues to have circumstantial thought but appears to be aware of this. She admits to eating and sleeping well stating she is eating and sleeping the best she has in some time. She admits to having an issue with a pt but states she is happy to move on from it. She denies SI/HI/AVH or PI.   10/30: Pt is "excited" to learn of new roommate which she hopes to byrd with in an attempt to feel better about missing her granddaughter. She reports having issues with continence and requests adult diapers as she is unable to make it to the bathroom in time. She described an event of incontinence occurring last night. After conversation about medication, pt agrees to comply, despite her reluctance due to belief her psychiatric medication is "poison." She is sleeping well (4 hours), and eating well, though she is concerned she is eating too much. Pt denies SI/HI/AVH/PI.  Patient is a 69 year old AA female, single with adult daughter, domiciled alone previously with [facility] services, with PPH of bipolar disorder and depression per daughter and PMH of rheumatoid arthritis and lupus, who was referred by APS due to concern for poor self-care and was admitted to the medicine service for failure to thrive. Psychiatry was consulted for a psychiatric evaluation for bipolar disorder and patient was transferred to [facility] under involuntary status due to poor self-care, mood lability, and psychosis (bizarre delusion of being poisoned by TONEY new[place]). On evaluation, patient is significantly calmer and more cooperative today than yesterday; however, patient appears religiously preoccupied and hyperverbal today. Patient briefly appeared internally preoccupied and appeared to be speaking about being poisoned with medications with an unknown entity in the room as her gaze was focused on a specific area; however, she immediately minimized it. She presents very differently from yesterday though per daughter, patient may be attempting to feign normalcy to expedite discharge. Per collateral from daughter and APS worker supervisor, patient has demonstrated poor self-care that has deteriorated significantly in the last several months resulting in weight loss, disorganized behavior, an untenable living situation, and poor hygiene. Patient has been very irritable and angry for several years, and has a history of good medication effect when hospitalized but poor compliance in the community. Per daughter, patient has also reported auditory hallucinations recently, but has not done so previously. Patient's current presentation may be secondary to decompensation in bipolar disorder as patient's symptoms are slightly manic (hyperverbal, mood lability, Islam preoccupation) and psychotic (auditory hallucinations, paranoia). However, given longstanding history of poor self-care and marked change in behavior a decade ago, schizoaffective disorder is more likely at this time. Patient requires inpatient psychiatric admission as she is unable to care for herself in the setting of psychosis, poor insight and judgment, and medication non-compliance.    - Continue Depakote 250 bid for mood stabilization  - Increase Zyprexa to 7.5mg QHS for psychosis, consider titrating as patient was previously on 15 mg   - Consider restarting methotrexate and hydroxychlorquine (patient's previous home medications), discussed with Dr. Parrish; medicine consult for tomorrow AM   - Supplement meals with Ensure  - PT evaluation and treatment due to marked difficulty in ambulating requiring assistance  - 1:1 observation discontinued    10/28: Pt is "angry" at her daughter for "tricking" her into admission. Concurrently, pt claims to be happy about her admittance as she is hopeful she will receive the recourses she feels are necessary for her self-care. She admits to poor eating due to her malodorous home. She admits to prior AVH in the setting of visual hallucinations of zombies, "naked people, bugs and AH of people cussing and people in her wall at home "shooting up" with last presentation 2 days prior to evaluation. She states she is sleeping better since her arrival. She denies current SI/HI/AVH/PI.   MMSE: 23 - Mild Cognitive Impairment.   Ordered repeat UA and Cx after viewing abnormal results on 10/24  10/29: Pt is "happy" to be here and get the care she needs. She continues to have circumstantial thought but appears to be aware of this. She admits to eating and sleeping well stating she is eating and sleeping the best she has in some time. She admits to having an issue with a pt but states she is happy to move on from it. She denies SI/HI/AVH or PI.   10/30: Pt is "excited" to learn of new roommate which she hopes to byrd with in an attempt to feel better about missing her granddaughter. She reports having issues with continence and requests adult diapers as she is unable to make it to the bathroom in time. She described an event of incontinence occurring last night. After conversation about medication, pt agrees to comply, despite her reluctance due to belief her psychiatric medication is "poison." She is sleeping well (4 hours), and eating well, though she is concerned she is eating too much. Pt denies SI/HI/AVH/PI.   Urine cup provided for pt to obtain repeat UA and Cx Patient is a 69 year old AA female, single with adult daughter, domiciled alone previously with [facility] services, with PPH of bipolar disorder and depression per daughter and PMH of rheumatoid arthritis and lupus, who was referred by APS due to concern for poor self-care and was admitted to the medicine service for failure to thrive. Psychiatry was consulted for a psychiatric evaluation for bipolar disorder and patient was transferred to [facility] under involuntary status due to poor self-care, mood lability, and psychosis (bizarre delusion of being poisoned by TONEY new[place]). On evaluation, patient is significantly calmer and more cooperative today than yesterday; however, patient appears religiously preoccupied and hyperverbal today. Patient briefly appeared internally preoccupied and appeared to be speaking about being poisoned with medications with an unknown entity in the room as her gaze was focused on a specific area; however, she immediately minimized it. She presents very differently from yesterday though per daughter, patient may be attempting to feign normalcy to expedite discharge. Per collateral from daughter and APS worker supervisor, patient has demonstrated poor self-care that has deteriorated significantly in the last several months resulting in weight loss, disorganized behavior, an untenable living situation, and poor hygiene. Patient has been very irritable and angry for several years, and has a history of good medication effect when hospitalized but poor compliance in the community. Per daughter, patient has also reported auditory hallucinations recently, but has not done so previously. Patient's current presentation may be secondary to decompensation in bipolar disorder as patient's symptoms are slightly manic (hyperverbal, mood lability, Latter-day preoccupation) and psychotic (auditory hallucinations, paranoia). However, given longstanding history of poor self-care and marked change in behavior a decade ago, schizoaffective disorder is more likely at this time. Patient requires inpatient psychiatric admission as she is unable to care for herself in the setting of psychosis, poor insight and judgment, and medication non-compliance.    - Continue Depakote 250 bid for mood stabilization  - Increase Zyprexa to 10mg QHS for psychosis, consider titrating as patient was previously on 15 mg   - Consider restarting methotrexate and hydroxychlorquine (patient's previous home medications), discussed with Dr. Parrish; medicine consult for tomorrow AM   - Supplement meals with Ensure  - PT evaluation and treatment due to marked difficulty in ambulating requiring assistance  - 1:1 observation discontinued    10/28: Pt is "angry" at her daughter for "tricking" her into admission. Concurrently, pt claims to be happy about her admittance as she is hopeful she will receive the recourses she feels are necessary for her self-care. She admits to poor eating due to her malodorous home. She admits to prior AVH in the setting of visual hallucinations of zombies, "naked people, bugs and AH of people cussing and people in her wall at home "shooting up" with last presentation 2 days prior to evaluation. She states she is sleeping better since her arrival. She denies current SI/HI/AVH/PI.   MMSE: 23 - Mild Cognitive Impairment.   Ordered repeat UA and Cx after viewing abnormal results on 10/24  10/29: Pt is "happy" to be here and get the care she needs. She continues to have circumstantial thought but appears to be aware of this. She admits to eating and sleeping well stating she is eating and sleeping the best she has in some time. She admits to having an issue with a pt but states she is happy to move on from it. She denies SI/HI/AVH or PI.   10/30: Pt is "excited" to learn of new roommate which she hopes to byrd with in an attempt to feel better about missing her granddaughter. She reports having issues with continence and requests adult diapers as she is unable to make it to the bathroom in time. She described an event of incontinence occurring last night. After conversation about medication, pt agrees to comply, despite her reluctance due to belief her psychiatric medication is "poison." She is sleeping well (4 hours), and eating well, though she is concerned she is eating too much. Pt denies SI/HI/AVH/PI.   Urine cup provided for pt to obtain repeat UA and Cx

## 2024-10-30 NOTE — BH INPATIENT PSYCHIATRY PROGRESS NOTE - CURRENT MEDICATION
MEDICATIONS  (STANDING):  cholecalciferol 600 Unit(s) Oral daily  divalproex  milliGRAM(s) Oral two times a day  folic acid 1 milliGRAM(s) Oral daily  hydroxychloroquine 200 milliGRAM(s) Oral two times a day  methotrexate 7.5 milliGRAM(s) Oral every week  multivitamin 1 Tablet(s) Oral daily  OLANZapine 7.5 milliGRAM(s) Oral at bedtime  thiamine 100 milliGRAM(s) Oral daily    MEDICATIONS  (PRN):  aluminum hydroxide/magnesium hydroxide/simethicone Suspension 30 milliLiter(s) Oral every 6 hours PRN Dyspepsia  ibuprofen  Tablet. 200 milliGRAM(s) Oral three times a day PRN Moderate Pain (4 - 6)  LORazepam     Tablet 1 milliGRAM(s) Oral every 1 hour PRN CIWA-Ar score 8 or greater  OLANZapine 5 milliGRAM(s) Oral every 8 hours PRN agitation   MEDICATIONS  (STANDING):  cholecalciferol 600 Unit(s) Oral daily  divalproex  milliGRAM(s) Oral two times a day  folic acid 1 milliGRAM(s) Oral daily  hydroxychloroquine 200 milliGRAM(s) Oral two times a day  methotrexate 7.5 milliGRAM(s) Oral every week  multivitamin 1 Tablet(s) Oral daily  OLANZapine 10 milliGRAM(s) Oral at bedtime  thiamine 100 milliGRAM(s) Oral daily    MEDICATIONS  (PRN):  aluminum hydroxide/magnesium hydroxide/simethicone Suspension 30 milliLiter(s) Oral every 6 hours PRN Dyspepsia  ibuprofen  Tablet. 200 milliGRAM(s) Oral three times a day PRN Moderate Pain (4 - 6)  LORazepam     Tablet 1 milliGRAM(s) Oral every 1 hour PRN CIWA-Ar score 8 or greater  OLANZapine 5 milliGRAM(s) Oral every 8 hours PRN agitation

## 2024-10-30 NOTE — BH INPATIENT PSYCHIATRY PROGRESS NOTE - NSBHFUPINTERVALHXFT_PSY_A_CORE
On presentation, patient is found in bed appearing to be in a pleasant mood, smiling at writer. She continues to have circumstantial speech which she is aware of calling it "babble." She shares that she is excited to get a new roommate today and looks forward to having someone close to her granddaughters age, as she misses her. She speaks about offering her roommate advice to stay on a good path and make wise decision. The pt is advised to give her roommate time to settle in and let the conversation flow naturally. She acknowledges this advice, agreeing to wait for her roommate to open up in her own time.  When asked about her sleep, the pt reports resting well. She received medication (Depakote) at 10 PM and fell asleep within an hour, sleeping until 3 AM, when she awoke with pain from a tight wristband and RA pain, describing the pain as a 12/10. The pt requests adult diapers, mentioning she "had an accident yesterday." She states she used them at home and finds them helpful when needed. She also shares that at home, she has a bedside "piss pot" and pads she calls "doo-doo pads" on the floor due to difficulty reaching the restroom in time. She admits to having a restroom full of urine and feces that she avoids as well. Ms. Trinidad feels she would benefit from having a walker and a stool to shower at home and is advised to discuss this with her family. Mentioning her daughter, however, leads to immediate frustration. She expresses resentment toward her daughter for her admission, and requests that the conversation shift to a different topic.   The patient is informed of the addition of new medication for her dx of SLE and RA and agrees to take them. She provides the names of another pharmacy ([facility] on [address]), requesting that staff coordinate with them to ensure all her medication are accounted for. She acknowledges previously refusing medications, expressing reluctance toward psychiatric medications, which she describes as "poison." She indicates she'll only take them if deemed necessary. After discussing the teams belief that she will benefit from the medication, she ultimately agrees to comply.   Patient admits she hs been eating well, perhaps "too much," and while she wants to gain weight, she aims to keep it moderate, with a goal of reaching 110 pounds (only a 13-pound increase).   Patient hasn't attended group sessions, as she prefers to focus on herself rather than hearing about other patients challenges. She agrees to participate in open studio session but expresses reluctance to make any firm commitments.   Pt denies SI/HI/AVH or PI.  On presentation, patient is found in bed appearing to be in a pleasant mood, smiling at writer. She continues to have circumstantial speech which she is aware of calling it "babble." She shares that she is excited to get a new roommate today and looks forward to having someone close to her granddaughters age, as she misses her. She speaks about offering her roommate advice to stay on a good path and make wise decision. The pt is advised to give her roommate time to settle in and let the conversation flow naturally. She acknowledges this advice, agreeing to wait for her roommate to open up in her own time.  When asked about her sleep, the pt reports resting well. She received medication (Depakote) at 10 PM and fell asleep within an hour, sleeping until 3 AM, when she awoke with pain from a tight wristband and RA pain, describing the pain as a 12/10. The pt requests adult diapers, mentioning she "had an accident yesterday." She states she used them at home and finds them helpful when needed. She also shares that at home, she has a bedside "piss pot" and pads she calls "doo-doo pads" on the floor due to difficulty reaching the restroom in time. She admits to having a restroom full of urine and feces that she avoids as well. Ms. Trinidad feels she would benefit from having a walker and a stool to shower at home and is advised to discuss this with her family. Mentioning her daughter, however, leads to immediate frustration. She expresses resentment toward her daughter for her admission, and requests that the conversation shift to a different topic.   The patient is informed of the addition of new medication for her dx of SLE and RA and agrees to take them. She provides the names of another pharmacy ([facility] on [address]), requesting that staff coordinate with them to ensure all her medication are accounted for. She acknowledges previously refusing medications, expressing reluctance toward psychiatric medications, which she describes as "poison." She indicates she'll only take them if deemed necessary. After discussing the teams belief that she will benefit from the medication, she ultimately agrees to comply.   Patient admits she hs been eating well, perhaps "too much," and while she wants to gain weight, she aims to keep it moderate, with a goal of reaching 110 pounds (only a 13-pound increase).   Patient hasn't attended group sessions, as she prefers to focus on herself rather than hearing about other patients challenges. She agrees to participate in open studio session but expresses reluctance to make any firm commitments.   Pt denies SI/HI/AVH or PI.   Adult diapers were provided by staff. On presentation, patient is found in bed appearing to be in a pleasant mood, smiling at writer. She continues to have circumstantial speech which she is aware of calling it "babble." She shares that she is excited to get a new roommate today and looks forward to having someone close to her granddaughters age, as she misses her. She speaks about offering her roommate advice to stay on a good path and make wise decision. The pt is advised to give her roommate time to settle in and let the conversation flow naturally. She acknowledges this advice, agreeing to wait for her roommate to open up in her own time.  When asked about her sleep, the pt reports resting well. She received medication (Depakote) at 10 PM and fell asleep within an hour, sleeping until 3 AM, when she awoke with pain from a tight wristband and RA pain, describing the pain as a 12/10. The pt requests adult diapers, mentioning she "had an accident yesterday." She states she used them at home and finds them helpful when needed. She also shares that at home, she has a bedside "piss pot" and pads she calls "doo-doo pads" on the floor due to difficulty reaching the restroom in time. She admits to having a restroom full of urine and feces that she avoids as well. Ms. Trinidad feels she would benefit from having a walker and a stool to shower at home and is advised to discuss this with her family. Mentioning her daughter, however, leads to immediate frustration. She expresses resentment toward her daughter for her admission, and requests that the conversation shift to a different topic.   The patient is informed of the addition of new medication for her dx of SLE and RA and agrees to take them. She provides the names of another pharmacy ([facility] on [address]), requesting that staff coordinate with them to ensure all her medication are accounted for. She acknowledges previously refusing medications, expressing reluctance toward psychiatric medications, which she describes as "poison." She indicates she'll only take them if deemed necessary. After discussing the teams belief that she will benefit from the medication, she ultimately agrees to comply.   Patient admits she hs been eating well, perhaps "too much," and while she wants to gain weight, she aims to keep it moderate, with a goal of reaching 110 pounds (only a 13-pound increase).   Patient hasn't attended group sessions, as she prefers to focus on herself rather than hearing about other patients challenges. She agrees to participate in open studio session but expresses reluctance to make any firm commitments.   Pt denies SI/HI/AVH or PI.   Adult diapers were provided by staff.  Urine Cup provided for pt to obtain repeat UA and Cx

## 2024-10-30 NOTE — BH INPATIENT PSYCHIATRY PROGRESS NOTE - NSBHMETABOLIC_PSY_ALL_CORE_FT
BMI: BMI (kg/m2): 17.4 (10-29-24 @ 09:01)  HbA1c: A1C with Estimated Average Glucose Result: 5.7 % (10-26-24 @ 22:00)    Glucose:   BP: 127/75 (10-30-24 @ 09:49) (105/65 - 138/78)Vital Signs Last 24 Hrs  T(C): 36.5 (10-30-24 @ 09:49), Max: 36.5 (10-30-24 @ 09:49)  T(F): 97.7 (10-30-24 @ 09:49), Max: 97.7 (10-30-24 @ 09:49)  HR: 75 (10-30-24 @ 09:49) (75 - 96)  BP: 127/75 (10-30-24 @ 09:49) (113/66 - 127/75)  BP(mean): --  RR: 18 (10-30-24 @ 09:49) (18 - 18)  SpO2: 95% (10-30-24 @ 09:49) (95% - 97%)      Lipid Panel: Date/Time: 10-26-24 @ 22:00  Cholesterol, Serum: 158  LDL Cholesterol Calculated: 85  HDL Cholesterol, Serum: 62  Total Cholesterol/HDL Ration Measurement: --  Triglycerides, Serum: 54   BMI: BMI (kg/m2): 17.4 (10-29-24 @ 09:01)  HbA1c: A1C with Estimated Average Glucose Result: 5.7 % (10-26-24 @ 22:00)    Glucose:   BP: 137/76 (10-31-24 @ 09:45) (112/71 - 137/76)Vital Signs Last 24 Hrs  T(C): 36.4 (10-31-24 @ 09:45), Max: 36.5 (10-30-24 @ 17:01)  T(F): 97.5 (10-31-24 @ 09:45), Max: 97.7 (10-30-24 @ 17:01)  HR: 83 (10-31-24 @ 09:45) (83 - 107)  BP: 137/76 (10-31-24 @ 09:45) (124/77 - 137/76)  BP(mean): --  RR: 17 (10-31-24 @ 09:45) (17 - 18)  SpO2: 100% (10-31-24 @ 09:45) (100% - 100%)      Lipid Panel: Date/Time: 10-26-24 @ 22:00  Cholesterol, Serum: 158  LDL Cholesterol Calculated: 85  HDL Cholesterol, Serum: 62  Total Cholesterol/HDL Ration Measurement: --  Triglycerides, Serum: 54

## 2024-10-30 NOTE — PROGRESS NOTE ADULT - ASSESSMENT
69 y.o F w/ PMHx of bipolar d/o, SLE, rheumatoid arthritis w/ initial admission to medicine for FTT, brought in by APS. Pt on no home meds for RA, bipolar or SLE despite being previously prescribed Plaquenil and methotrexate (for SLE) and depakote for Bipolar. Pt was transferred to inpatient psych for further evaluation.    # SLE  Pt previously on plaquenil and methotrexate. Currently WBC WNL, no contraindication from medical standpoint in restarting meds  - Please obtain EKG today, assess QTc. (QTc was WNL on 10/26).   - C/w home doses of plaquenil and methotrexate.   - outpt Rheum follow up   - f/u daily labs upon initiation of meds    # Vit D def  Pt with Vit D def. Current Vit D at 25.5  - c/w 600U daily    # Bipolar d/o  Pt on depakote, restarted during inpatient admission  - care per primary team    Medicine will continue to follow

## 2024-10-30 NOTE — BH INPATIENT PSYCHIATRY PROGRESS NOTE - NSBHATTESTCOMMENTATTENDFT_PSY_A_CORE
I agree with notation. I also saw patient, noted her to be intense, irritable and labile, frequently cursing and using foul speech, sarcastic. MAR reviewed and she refused all morning meds today as well as depakote last night. Will plan to further titrate zyprexa to 7.5mg qhs. Confirm with family her plaquenil and methotrexate doses and restart. Medicine consult continues to follow and recs are appreciated.  I agree with notation. I also saw patient, noted her to be very pleasant, smiling widely, states that she needs some assistance with getting her phone so that she can call her mother and her aunt. She has no other complaints today. Compliant with med regimen

## 2024-10-30 NOTE — BH INPATIENT PSYCHIATRY PROGRESS NOTE - NSICDXBHSECONDARYDX_PSY_ALL_CORE
Alcohol abuse   F10.10  Rheumatoid arthritis   M06.9  Systemic lupus   M32.9  Severe protein-calorie malnutrition   E43

## 2024-10-30 NOTE — BH INPATIENT PSYCHIATRY PROGRESS NOTE - NSBHCHARTREVIEWVS_PSY_A_CORE FT
Vital Signs Last 24 Hrs  T(C): 36.5 (10-30-24 @ 09:49), Max: 36.5 (10-30-24 @ 09:49)  T(F): 97.7 (10-30-24 @ 09:49), Max: 97.7 (10-30-24 @ 09:49)  HR: 75 (10-30-24 @ 09:49) (75 - 96)  BP: 127/75 (10-30-24 @ 09:49) (113/66 - 127/75)  BP(mean): --  RR: 18 (10-30-24 @ 09:49) (18 - 18)  SpO2: 95% (10-30-24 @ 09:49) (95% - 97%)     Vital Signs Last 24 Hrs  T(C): 36.4 (10-31-24 @ 09:45), Max: 36.5 (10-30-24 @ 17:01)  T(F): 97.5 (10-31-24 @ 09:45), Max: 97.7 (10-30-24 @ 17:01)  HR: 83 (10-31-24 @ 09:45) (83 - 107)  BP: 137/76 (10-31-24 @ 09:45) (124/77 - 137/76)  BP(mean): --  RR: 17 (10-31-24 @ 09:45) (17 - 18)  SpO2: 100% (10-31-24 @ 09:45) (100% - 100%)

## 2024-10-31 PROCEDURE — 99232 SBSQ HOSP IP/OBS MODERATE 35: CPT

## 2024-10-31 RX ADMIN — HYDROXYCHLOROQUINE SULFATE 200 MILLIGRAM(S): 200 TABLET, FILM COATED ORAL at 19:14

## 2024-10-31 RX ADMIN — Medication 1 MILLIGRAM(S): at 11:43

## 2024-10-31 RX ADMIN — Medication 1 TABLET(S): at 11:44

## 2024-10-31 RX ADMIN — Medication 600 UNIT(S): at 11:44

## 2024-10-31 NOTE — BH INPATIENT PSYCHIATRY PROGRESS NOTE - NSBHCHARTREVIEWVS_PSY_A_CORE FT
Vital Signs Last 24 Hrs  T(C): 36.4 (10-31-24 @ 09:45), Max: 36.5 (10-30-24 @ 17:01)  T(F): 97.5 (10-31-24 @ 09:45), Max: 97.7 (10-30-24 @ 17:01)  HR: 83 (10-31-24 @ 09:45) (83 - 107)  BP: 137/76 (10-31-24 @ 09:45) (124/77 - 137/76)  BP(mean): --  RR: 17 (10-31-24 @ 09:45) (17 - 18)  SpO2: 100% (10-31-24 @ 09:45) (100% - 100%)     Vital Signs Last 24 Hrs  T(C): 35.7 (11-01-24 @ 09:21), Max: 35.7 (11-01-24 @ 09:21)  T(F): 96.3 (11-01-24 @ 09:21), Max: 96.3 (11-01-24 @ 09:21)  HR: 79 (11-01-24 @ 09:21) (79 - 79)  BP: 126/80 (11-01-24 @ 09:21) (126/80 - 126/80)  BP(mean): --  RR: 18 (11-01-24 @ 09:21) (18 - 18)  SpO2: 100% (11-01-24 @ 09:21) (100% - 100%)

## 2024-10-31 NOTE — BH INPATIENT PSYCHIATRY PROGRESS NOTE - NSBHFUPINTERVALHXFT_PSY_A_CORE
On presentation, patient appeared cheerful, saying "good morning." She states she will return to conversation after receiving vitals. Writer walked with patient, but was informed by other staff member that vitals are done for the morning. Ms. Trinidad appeared frustrated and returned to room. When patient was asked about her sleep, she stated she slept well but was up at 4 AM due to pain. She expressed frustration with staff because she states to have informed them of her pain, but the staff member never returned to offer her medication. Pt went on to inform writer, that she refused medication this morning because plenty of time had passed since her pain began and at this point she would prefer to handle it rather than take medication. When asked about why the patient also refused medication last night, she stated she wanted to sleep and prefers not to have medication anyway. Pt began to get frustrated and asked writer to leave, refusing to continue the conversation. She says she would not like to speak to anyone until tonight when she speaks to her . Pt used a paper to block visual from the writer and said "I know how to block someone, you are blocked, bye." She used profanity while writer exited room.     After receiving collateral from staff, writer was informed staff member took Ms. Trinidad's vitals this AM.     Urine results reviewed and WNL

## 2024-10-31 NOTE — BH INPATIENT PSYCHIATRY PROGRESS NOTE - NSBHASSESSSUMMFT_PSY_ALL_CORE
Patient is a 69 year old AA female, single with adult daughter, domiciled alone previously with [facility] services, with PPH of bipolar disorder and depression per daughter and PMH of rheumatoid arthritis and lupus, who was referred by APS due to concern for poor self-care and was admitted to the medicine service for failure to thrive. Psychiatry was consulted for a psychiatric evaluation for bipolar disorder and patient was transferred to [facility] under involuntary status due to poor self-care, mood lability, and psychosis (bizarre delusion of being poisoned by [employer]). On evaluation, patient is significantly calmer and more cooperative today than yesterday; however, patient appears religiously preoccupied and hyperverbal today. Patient briefly appeared internally preoccupied and appeared to be speaking about being poisoned with medications with an unknown entity in the room as her gaze was focused on a specific area; however, she immediately minimized it. She presents very differently from yesterday though per daughter, patient may be attempting to feign normalcy to expedite discharge. Per collateral from daughter and APS worker supervisor, patient has demonstrated poor self-care that has deteriorated significantly in the last several months resulting in weight loss, disorganized behavior, an untenable living situation, and poor hygiene. Patient has been very irritable and angry for several years, and has a history of good medication effect when hospitalized but poor compliance in the community. Per daughter, patient has also reported auditory hallucinations recently, but has not done so previously. Patient's current presentation may be secondary to decompensation in bipolar disorder as patient's symptoms are slightly manic (hyperverbal, mood lability, Christian preoccupation) and psychotic (auditory hallucinations, paranoia). However, given longstanding history of poor self-care and marked change in behavior a decade ago, schizoaffective disorder is more likely at this time. Patient requires inpatient psychiatric admission as she is unable to care for herself in the setting of psychosis, poor insight and judgment, and medication non-compliance.    - Continue Depakote 250 bid for mood stabilization  - Increase Zyprexa to 7.5mg QHS for psychosis, consider titrating as patient was previously on 15 mg   - Consider restarting methotrexate and hydroxychlorquine (patient's previous home medications), discussed with Dr. Parrish; medicine consult for tomorrow AM   - Supplement meals with Ensure  - PT evaluation and treatment due to marked difficulty in ambulating requiring assistance  - 1:1 observation discontinued    10/28: Pt is "angry" at her daughter for "tricking" her into admission. Concurrently, pt claims to be happy about her admittance as she is hopeful she will receive the recourses she feels are necessary for her self-care. She admits to poor eating due to her malodorous home. She admits to prior AVH in the setting of visual hallucinations of zombies, "naked people, bugs and AH of people cussing and people in her wall at home "shooting up" with last presentation 2 days prior to evaluation. She states she is sleeping better since her arrival. She denies current SI/HI/AVH/PI.   MMSE: 23 - Mild Cognitive Impairment.   Ordered repeat UA and Cx after viewing abnormal results on 10/24  10/29: Pt is "happy" to be here and get the care she needs. She continues to have circumstantial thought but appears to be aware of this. She admits to eating and sleeping well stating she is eating and sleeping the best she has in some time. She admits to having an issue with a pt but states she is happy to move on from it. She denies SI/HI/AVH or PI.   10/30: Pt is "excited" to learn of new roommate which she hopes to byrd with in an attempt to feel better about missing her granddaughter. She reports having issues with continence and requests adult diapers as she is unable to make it to the bathroom in time. She described an event of incontinence occurring last night. After conversation about medication, pt agrees to comply, despite her reluctance due to belief her psychiatric medication is "poison." She is sleeping well (4 hours), and eating well, though she is concerned she is eating too much. Pt denies SI/HI/AVH/PI.   Urine cup provided for pt to obtain repeat UA and Cx  10/31: Pt is confused, stating she needed to get vitals done when she already had them done this AM. She is irritable when discussing medications and refuses to speak about it with writer, ending the conversation early, due to frustration.   Urine lab results reviewed and WNL.

## 2024-10-31 NOTE — BH INPATIENT PSYCHIATRY PROGRESS NOTE - CURRENT MEDICATION
MEDICATIONS  (STANDING):  cholecalciferol 600 Unit(s) Oral daily  divalproex  milliGRAM(s) Oral two times a day  folic acid 1 milliGRAM(s) Oral daily  hydroxychloroquine 200 milliGRAM(s) Oral two times a day  methotrexate 7.5 milliGRAM(s) Oral every week  multivitamin 1 Tablet(s) Oral daily  OLANZapine 10 milliGRAM(s) Oral at bedtime  thiamine 100 milliGRAM(s) Oral daily    MEDICATIONS  (PRN):  aluminum hydroxide/magnesium hydroxide/simethicone Suspension 30 milliLiter(s) Oral every 6 hours PRN Dyspepsia  ibuprofen  Tablet. 200 milliGRAM(s) Oral three times a day PRN Moderate Pain (4 - 6)  LORazepam     Tablet 1 milliGRAM(s) Oral every 1 hour PRN CIWA-Ar score 8 or greater  OLANZapine 5 milliGRAM(s) Oral every 8 hours PRN agitation   MEDICATIONS  (STANDING):  cholecalciferol 600 Unit(s) Oral daily  divalproex  milliGRAM(s) Oral two times a day  folic acid 1 milliGRAM(s) Oral daily  hydroxychloroquine 200 milliGRAM(s) Oral two times a day  methotrexate 7.5 milliGRAM(s) Oral every week  multivitamin 1 Tablet(s) Oral daily  OLANZapine 2.5 milliGRAM(s) Oral once  OLANZapine 10 milliGRAM(s) Oral at bedtime  thiamine 100 milliGRAM(s) Oral daily    MEDICATIONS  (PRN):  aluminum hydroxide/magnesium hydroxide/simethicone Suspension 30 milliLiter(s) Oral every 6 hours PRN Dyspepsia  ibuprofen  Tablet. 200 milliGRAM(s) Oral three times a day PRN Moderate Pain (4 - 6)  LORazepam     Tablet 1 milliGRAM(s) Oral every 1 hour PRN CIWA-Ar score 8 or greater  OLANZapine 5 milliGRAM(s) Oral every 8 hours PRN agitation

## 2024-10-31 NOTE — BH INPATIENT PSYCHIATRY PROGRESS NOTE - NSBHMETABOLIC_PSY_ALL_CORE_FT
BMI: BMI (kg/m2): 17.4 (10-29-24 @ 09:01)  HbA1c: A1C with Estimated Average Glucose Result: 5.7 % (10-26-24 @ 22:00)    Glucose:   BP: 137/76 (10-31-24 @ 09:45) (112/71 - 137/76)Vital Signs Last 24 Hrs  T(C): 36.4 (10-31-24 @ 09:45), Max: 36.5 (10-30-24 @ 17:01)  T(F): 97.5 (10-31-24 @ 09:45), Max: 97.7 (10-30-24 @ 17:01)  HR: 83 (10-31-24 @ 09:45) (83 - 107)  BP: 137/76 (10-31-24 @ 09:45) (124/77 - 137/76)  BP(mean): --  RR: 17 (10-31-24 @ 09:45) (17 - 18)  SpO2: 100% (10-31-24 @ 09:45) (100% - 100%)      Lipid Panel: Date/Time: 10-26-24 @ 22:00  Cholesterol, Serum: 158  LDL Cholesterol Calculated: 85  HDL Cholesterol, Serum: 62  Total Cholesterol/HDL Ration Measurement: --  Triglycerides, Serum: 54   BMI: BMI (kg/m2): 17.4 (10-29-24 @ 09:01)  HbA1c: A1C with Estimated Average Glucose Result: 5.7 % (10-26-24 @ 22:00)    Glucose:   BP: 126/80 (11-01-24 @ 09:21) (113/66 - 137/76)Vital Signs Last 24 Hrs  T(C): 35.7 (11-01-24 @ 09:21), Max: 35.7 (11-01-24 @ 09:21)  T(F): 96.3 (11-01-24 @ 09:21), Max: 96.3 (11-01-24 @ 09:21)  HR: 79 (11-01-24 @ 09:21) (79 - 79)  BP: 126/80 (11-01-24 @ 09:21) (126/80 - 126/80)  BP(mean): --  RR: 18 (11-01-24 @ 09:21) (18 - 18)  SpO2: 100% (11-01-24 @ 09:21) (100% - 100%)      Lipid Panel: Date/Time: 10-26-24 @ 22:00  Cholesterol, Serum: 158  LDL Cholesterol Calculated: 85  HDL Cholesterol, Serum: 62  Total Cholesterol/HDL Ration Measurement: --  Triglycerides, Serum: 54

## 2024-10-31 NOTE — BH INPATIENT PSYCHIATRY PROGRESS NOTE - NSBHMSETHTPROC_PSY_A_CORE
Patient came to specials procedures for lumbar myelogram.    Procedure explained to patient, questions were answered. Patient was educated about the amount of radiation used with today's procedure. Patient positioned prone on table, secured and prepped for procedure. Emotional support given. 1054 start procedure /83 86 16 99% on room air, prone    1100 End procedure /75 87 16 100% on room air, prone    Patient tolerated procedure. bandaid place on back. Patient transferred back on cart supine.     Nurse to nurse called to [facility] spoke with nurse Marcus notified of above information    Patient taken to CT scan Circumstantial/Tangential

## 2024-10-31 NOTE — BH INPATIENT PSYCHIATRY PROGRESS NOTE - NSBHATTESTCOMMENTATTENDFT_PSY_A_CORE
I agree with notation. I also saw patient, noted her to be intense, irritable and labile, frequently cursing and using foul speech, sarcastic. MAR reviewed and she refused all morning meds today as well as depakote last night. Will plan to further titrate zyprexa to 7.5mg qhs. Confirm with family her plaquenil and methotrexate doses and restart. Medicine consult continues to follow and recs are appreciated.  I agree with notation. Patient noted to be labile, highly irritable, noted to be non-compliant with med regimen

## 2024-11-01 PROCEDURE — 99232 SBSQ HOSP IP/OBS MODERATE 35: CPT

## 2024-11-01 PROCEDURE — 99232 SBSQ HOSP IP/OBS MODERATE 35: CPT | Mod: GC

## 2024-11-01 RX ORDER — OLANZAPINE 20 MG/1
2.5 TABLET ORAL ONCE
Refills: 0 | Status: COMPLETED | OUTPATIENT
Start: 2024-11-01 | End: 2024-11-01

## 2024-11-01 RX ADMIN — Medication 1 MILLIGRAM(S): at 11:37

## 2024-11-01 RX ADMIN — HYDROXYCHLOROQUINE SULFATE 200 MILLIGRAM(S): 200 TABLET, FILM COATED ORAL at 23:01

## 2024-11-01 RX ADMIN — OLANZAPINE 10 MILLIGRAM(S): 20 TABLET ORAL at 23:01

## 2024-11-01 RX ADMIN — Medication 1 TABLET(S): at 11:37

## 2024-11-01 RX ADMIN — Medication 250 MILLIGRAM(S): at 23:01

## 2024-11-01 NOTE — BH INPATIENT PSYCHIATRY PROGRESS NOTE - CURRENT MEDICATION
MEDICATIONS  (STANDING):  cholecalciferol 600 Unit(s) Oral daily  divalproex  milliGRAM(s) Oral two times a day  folic acid 1 milliGRAM(s) Oral daily  hydroxychloroquine 200 milliGRAM(s) Oral two times a day  methotrexate 7.5 milliGRAM(s) Oral every week  multivitamin 1 Tablet(s) Oral daily  OLANZapine 10 milliGRAM(s) Oral at bedtime  OLANZapine 2.5 milliGRAM(s) Oral once  thiamine 100 milliGRAM(s) Oral daily    MEDICATIONS  (PRN):  aluminum hydroxide/magnesium hydroxide/simethicone Suspension 30 milliLiter(s) Oral every 6 hours PRN Dyspepsia  ibuprofen  Tablet. 200 milliGRAM(s) Oral three times a day PRN Moderate Pain (4 - 6)  LORazepam     Tablet 1 milliGRAM(s) Oral every 1 hour PRN CIWA-Ar score 8 or greater  OLANZapine 5 milliGRAM(s) Oral every 8 hours PRN agitation   MEDICATIONS  (STANDING):  cholecalciferol 600 Unit(s) Oral daily  divalproex  milliGRAM(s) Oral two times a day  folic acid 1 milliGRAM(s) Oral daily  hydroxychloroquine 200 milliGRAM(s) Oral two times a day  methotrexate 7.5 milliGRAM(s) Oral every week  multivitamin 1 Tablet(s) Oral daily  OLANZapine 10 milliGRAM(s) Oral at bedtime  thiamine 100 milliGRAM(s) Oral daily    MEDICATIONS  (PRN):  aluminum hydroxide/magnesium hydroxide/simethicone Suspension 30 milliLiter(s) Oral every 6 hours PRN Dyspepsia  ibuprofen  Tablet. 200 milliGRAM(s) Oral three times a day PRN Moderate Pain (4 - 6)  LORazepam     Tablet 1 milliGRAM(s) Oral every 1 hour PRN CIWA-Ar score 8 or greater  OLANZapine 5 milliGRAM(s) Oral every 8 hours PRN agitation

## 2024-11-01 NOTE — BH INPATIENT PSYCHIATRY PROGRESS NOTE - NSBHCHARTREVIEWLAB_PSY_A_CORE FT
Urine Appearance: Clear  Color: Yellow  Spec Gravity: 1.018  pH Urine: 6.0  Protein, Urine: negative  Ketone - Urine: negative   blood, urine: negative  Bilirubin: negative   Urobilinogen: 0.2  Leukocyte Esterase Concentration: negative  Nitrate: negative  Glucose Qualitative, Urine: negative
Urine Appearance: Clear  Color: Yellow  Spec Gravity: 1.018  pH Urine: 6.0  Protein, Urine: negative  Ketone - Urine: negative   blood, urine: negative  Bilirubin: negative   Urobilinogen: 0.2  Leukocyte Esterase Concentration: negative  Nitrate: negative  Glucose Qualitative, Urine: negative

## 2024-11-01 NOTE — PROGRESS NOTE ADULT - ASSESSMENT
69 y.o F w/ PMHx of bipolar d/o, SLE, rheumatoid arthritis w/ initial admission to medicine for FTT, brought in by APS. Pt on no home meds for RA, bipolar or SLE despite being previously prescribed Plaquenil and methotrexate (for SLE) and depakote for Bipolar. Pt was transferred to inpatient psych for further evaluation.    # SLE  Pt previously on plaquenil and methotrexate. Currently WBC WNL, no contraindication from medical standpoint in restarting meds  - Please obtain EKG today, assess QTc. (QTc was WNL on 10/26).   - C/w home doses of plaquenil and methotrexate.   - outpt Rheum follow up   - f/u daily labs upon initiation of meds    # Vit D def  Pt with Vit D def. Current Vit D at 25.5  - c/w 600U daily    # Bipolar d/o  Pt on depakote, restarted during inpatient admission  - care per primary team

## 2024-11-01 NOTE — BH CHART NOTE - NSEVENTNOTEFT_PSY_ALL_CORE
Spoke with Carmen Trinidad, patient's mother, regarding concerns about patient. Carmen is an elderly woman living in [place], and requests that any history from patient that is taken into serious consideration come from patient's daughter, Abril, as patient is not currently at her baseline and is in denial of her medical issues. She states the patient has bipolar disorder and alcohol abuse and has not taken medication in about 1 year. She states patient's baseline is a very sweet and loving person and she is aware patient is not doing well when she becomes irritable regularly. She requests that patient's medication be given to patient via IM/IV to ensure medication is administered. She was made aware of current plan to continue to encourage PO medications and to proceed with paperwork for treatment over objection if deemed necessary.     She is open to receive phone calls with any questions or updates and is available any time at:  [phone] - Cell Phone  [phone] - House Phone

## 2024-11-01 NOTE — BH INPATIENT PSYCHIATRY PROGRESS NOTE - NSBHMETABOLIC_PSY_ALL_CORE_FT
BMI: BMI (kg/m2): 17.4 (10-29-24 @ 09:01)  HbA1c: A1C with Estimated Average Glucose Result: 5.7 % (10-26-24 @ 22:00)    Glucose:   BP: 126/80 (11-01-24 @ 09:21) (113/66 - 137/76)Vital Signs Last 24 Hrs  T(C): 35.7 (11-01-24 @ 09:21), Max: 35.7 (11-01-24 @ 09:21)  T(F): 96.3 (11-01-24 @ 09:21), Max: 96.3 (11-01-24 @ 09:21)  HR: 79 (11-01-24 @ 09:21) (79 - 79)  BP: 126/80 (11-01-24 @ 09:21) (126/80 - 126/80)  BP(mean): --  RR: 18 (11-01-24 @ 09:21) (18 - 18)  SpO2: 100% (11-01-24 @ 09:21) (100% - 100%)      Lipid Panel: Date/Time: 10-26-24 @ 22:00  Cholesterol, Serum: 158  LDL Cholesterol Calculated: 85  HDL Cholesterol, Serum: 62  Total Cholesterol/HDL Ration Measurement: --  Triglycerides, Serum: 54
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
## 2024-11-01 NOTE — BH INPATIENT PSYCHIATRY PROGRESS NOTE - NSBHATTESTCOMMENTATTENDFT_PSY_A_CORE
I agree with notation. Patient noted to be labile, highly irritable, noted to be non-compliant with med regimen

## 2024-11-01 NOTE — BH INPATIENT PSYCHIATRY PROGRESS NOTE - NSBHASSESSSUMMFT_PSY_ALL_CORE
Patient is a 69 year old AA female, single with adult daughter, domiciled alone previously with [facility] services, with PPH of bipolar disorder and depression per daughter and PMH of rheumatoid arthritis and lupus, who was referred by APS due to concern for poor self-care and was admitted to the medicine service for failure to thrive. Psychiatry was consulted for a psychiatric evaluation for bipolar disorder and patient was transferred to [facility] under involuntary status due to poor self-care, mood lability, and psychosis (bizarre delusion of being poisoned by TONEY newton). On evaluation, patient is significantly calmer and more cooperative today than yesterday; however, patient appears religiously preoccupied and hyperverbal today. Patient briefly appeared internally preoccupied and appeared to be speaking about being poisoned with medications with an unknown entity in the room as her gaze was focused on a specific area; however, she immediately minimized it. She presents very differently from yesterday though per daughter, patient may be attempting to feign normalcy to expedite discharge. Per collateral from daughter and APS worker supervisor, patient has demonstrated poor self-care that has deteriorated significantly in the last several months resulting in weight loss, disorganized behavior, an untenable living situation, and poor hygiene. Patient has been very irritable and angry for several years, and has a history of good medication effect when hospitalized but poor compliance in the community. Per daughter, patient has also reported auditory hallucinations recently, but has not done so previously. Patient's current presentation may be secondary to decompensation in bipolar disorder as patient's symptoms are slightly manic (hyperverbal, mood lability, Hinduism preoccupation) and psychotic (auditory hallucinations, paranoia). However, given longstanding history of poor self-care and marked change in behavior a decade ago, schizoaffective disorder is more likely at this time. Patient requires inpatient psychiatric admission as she is unable to care for herself in the setting of psychosis, poor insight and judgment, and medication non-compliance.    - Continue Depakote 250 bid for mood stabilization  - Increase Zyprexa to 7.5mg QHS for psychosis, consider titrating as patient was previously on 15 mg   - Consider restarting methotrexate and hydroxychlorquine (patient's previous home medications), discussed with Dr. Parrish; medicine consult for tomorrow AM   - Supplement meals with Ensure  - PT evaluation and treatment due to marked difficulty in ambulating requiring assistance  - 1:1 observation discontinued    10/28: Pt is "angry" at her daughter for "tricking" her into admission. Concurrently, pt claims to be happy about her admittance as she is hopeful she will receive the recourses she feels are necessary for her self-care. She admits to poor eating due to her malodorous home. She admits to prior AVH in the setting of visual hallucinations of zombies, "naked people, bugs and AH of people cussing and people in her wall at home "shooting up" with last presentation 2 days prior to evaluation. She states she is sleeping better since her arrival. She denies current SI/HI/AVH/PI.   MMSE: 23 - Mild Cognitive Impairment.   Ordered repeat UA and Cx after viewing abnormal results on 10/24  10/29: Pt is "happy" to be here and get the care she needs. She continues to have circumstantial thought but appears to be aware of this. She admits to eating and sleeping well stating she is eating and sleeping the best she has in some time. She admits to having an issue with a pt but states she is happy to move on from it. She denies SI/HI/AVH or PI.   10/30: Pt is "excited" to learn of new roommate which she hopes to byrd with in an attempt to feel better about missing her granddaughter. She reports having issues with continence and requests adult diapers as she is unable to make it to the bathroom in time. She described an event of incontinence occurring last night. After conversation about medication, pt agrees to comply, despite her reluctance due to belief her psychiatric medication is "poison." She is sleeping well (4 hours), and eating well, though she is concerned she is eating too much. Pt denies SI/HI/AVH/PI.   Urine cup provided for pt to obtain repeat UA and Cx  10/31: Pt is confused, stating she needed to get vitals done when she already had them done this AM. She is irritable when discussing medications and refuses to speak about it with writer, ending the conversation early, due to frustration.   Urine lab results reviewed and WNL.   11/1: She continues to be labile alternating from kind and cheerful to frustrated rapidly. It appears her mood switches are based off the topic discussed. She has tangential speech and guided the conversation on her own. She admits to smoking since the age of 14 years old, stating she currently smokes 1 pack a week, as a vice when stressed and enjoys accompanying the cigarette with a beer. She speaks of her relationships with family and positive relationships with other patient's on floor. When informed the writer would like to ask just a few more questions and to discuss medications, the patient became irritated and demanded the writer leave, using profanity as the patient exited. Patient was seen on milieu dancing to the sound of the phone, stating it was for exercise. She was also seen by staff walking around with a towel on her head. Added Olanzapine 2.5 mg to be dispensed in the AM. Patient denied the medication, as well as all other medications except for her thiamin and multivitamin. She also refused her lab work.  Patient is a 69 year old AA female, single with adult daughter, domiciled alone previously with [facility] services, with PPH of bipolar disorder and depression per daughter and PMH of rheumatoid arthritis and lupus, who was referred by APS due to concern for poor self-care and was admitted to the medicine service for failure to thrive. Psychiatry was consulted for a psychiatric evaluation for bipolar disorder and patient was transferred to [email or web address] under involuntary status due to poor self-care, mood lability, and psychosis (bizarre delusion of being poisoned by TONE[place] new[place]). On evaluation, patient is significantly calmer and more cooperative today than yesterday; however, patient appears religiously preoccupied and hyperverbal today. Patient briefly appeared internally preoccupied and appeared to be speaking about being poisoned with medications with an unknown entity in the room as her gaze was focused on a specific area; however, she immediately minimized it. She presents very differently from yesterday though per daughter, patient may be attempting to feign normalcy to expedite discharge. Per collateral from daughter and APS worker supervisor, patient has demonstrated poor self-care that has deteriorated significantly in the last several months resulting in weight loss, disorganized behavior, an untenable living situation, and poor hygiene. Patient has been very irritable and angry for several years, and has a history of good medication effect when hospitalized but poor compliance in the community. Per daughter, patient has also reported auditory hallucinations recently, but has not done so previously. Patient's current presentation may be secondary to decompensation in bipolar disorder as patient's symptoms are slightly manic (hyperverbal, mood lability, Roman Catholic preoccupation) and psychotic (auditory hallucinations, paranoia). However, given longstanding history of poor self-care and marked change in behavior a decade ago, schizoaffective disorder is more likely at this time. Patient requires inpatient psychiatric admission as she is unable to care for herself in the setting of psychosis, poor insight and judgment, and medication non-compliance.    - Continue Depakote 250 bid for mood stabilization  - Increase Zyprexa to total of 2.5mg daily/10mg QHS for psychosis, consider titrating as patient was previously on 15 mg   - methotrexate and hydroxychlorquine   - Supplement meals with Ensure  - PT evaluation and treatment due to marked difficulty in ambulating requiring assistance  - 1:1 observation discontinued    10/28: Pt is "angry" at her daughter for "tricking" her into admission. Concurrently, pt claims to be happy about her admittance as she is hopeful she will receive the recourses she feels are necessary for her self-care. She admits to poor eating due to her malodorous home. She admits to prior AVH in the setting of visual hallucinations of zombies, "naked people, bugs and AH of people cussing and people in her wall at home "shooting up" with last presentation 2 days prior to evaluation. She states she is sleeping better since her arrival. She denies current SI/HI/AVH/PI.   MMSE: 23 - Mild Cognitive Impairment.   Ordered repeat UA and Cx after viewing abnormal results on 10/24  10/29: Pt is "happy" to be here and get the care she needs. She continues to have circumstantial thought but appears to be aware of this. She admits to eating and sleeping well stating she is eating and sleeping the best she has in some time. She admits to having an issue with a pt but states she is happy to move on from it. She denies SI/HI/AVH or PI.   10/30: Pt is "excited" to learn of new roommate which she hopes to byrd with in an attempt to feel better about missing her granddaughter. She reports having issues with continence and requests adult diapers as she is unable to make it to the bathroom in time. She described an event of incontinence occurring last night. After conversation about medication, pt agrees to comply, despite her reluctance due to belief her psychiatric medication is "poison." She is sleeping well (4 hours), and eating well, though she is concerned she is eating too much. Pt denies SI/HI/AVH/PI.   Urine cup provided for pt to obtain repeat UA and Cx  10/31: Pt is confused, stating she needed to get vitals done when she already had them done this AM. She is irritable when discussing medications and refuses to speak about it with writer, ending the conversation early, due to frustration.   Urine lab results reviewed and WNL.   11/1: She continues to be labile alternating from kind and cheerful to frustrated rapidly. It appears her mood switches are based off the topic discussed. She has tangential speech and guided the conversation on her own. She admits to smoking since the age of 14 years old, stating she currently smokes 1 pack a week, as a vice when stressed and enjoys accompanying the cigarette with a beer. She speaks of her relationships with family and positive relationships with other patient's on floor. When informed the writer would like to ask just a few more questions and to discuss medications, the patient became irritated and demanded the writer leave, using profanity as the patient exited. Patient was seen on milieu dancing to the sound of the phone, stating it was for exercise. She was also seen by staff walking around with a towel on her head. Added Olanzapine 2.5 mg to be dispensed in the AM. Patient denied the medication, as well as all other medications except for her thiamin and multivitamin. She also refused her lab work.

## 2024-11-01 NOTE — BH INPATIENT PSYCHIATRY PROGRESS NOTE - NSBHCHARTREVIEWVS_PSY_A_CORE FT
Vital Signs Last 24 Hrs  T(C): 35.7 (11-01-24 @ 09:21), Max: 35.7 (11-01-24 @ 09:21)  T(F): 96.3 (11-01-24 @ 09:21), Max: 96.3 (11-01-24 @ 09:21)  HR: 79 (11-01-24 @ 09:21) (79 - 79)  BP: 126/80 (11-01-24 @ 09:21) (126/80 - 126/80)  BP(mean): --  RR: 18 (11-01-24 @ 09:21) (18 - 18)  SpO2: 100% (11-01-24 @ 09:21) (100% - 100%)

## 2024-11-02 PROCEDURE — 99232 SBSQ HOSP IP/OBS MODERATE 35: CPT | Mod: GC

## 2024-11-02 RX ADMIN — Medication 600 UNIT(S): at 10:42

## 2024-11-02 RX ADMIN — HYDROXYCHLOROQUINE SULFATE 200 MILLIGRAM(S): 200 TABLET, FILM COATED ORAL at 22:34

## 2024-11-02 RX ADMIN — Medication 250 MILLIGRAM(S): at 22:35

## 2024-11-02 RX ADMIN — OLANZAPINE 10 MILLIGRAM(S): 20 TABLET ORAL at 22:35

## 2024-11-02 RX ADMIN — Medication 1 TABLET(S): at 10:44

## 2024-11-02 RX ADMIN — Medication 100 MILLIGRAM(S): at 10:42

## 2024-11-03 PROCEDURE — 99232 SBSQ HOSP IP/OBS MODERATE 35: CPT

## 2024-11-03 RX ADMIN — OLANZAPINE 10 MILLIGRAM(S): 20 TABLET ORAL at 22:30

## 2024-11-03 RX ADMIN — Medication 250 MILLIGRAM(S): at 22:31

## 2024-11-03 RX ADMIN — HYDROXYCHLOROQUINE SULFATE 200 MILLIGRAM(S): 200 TABLET, FILM COATED ORAL at 22:31

## 2024-11-03 NOTE — PROGRESS NOTE ADULT - ASSESSMENT
69 y.o F w/ PMHx of bipolar d/o, SLE, rheumatoid arthritis w/ initial admission to medicine for FTT, brought in by APS. Pt on no home meds for RA, bipolar or SLE despite being previously prescribed Plaquenil and methotrexate (for SLE) and depakote for Bipolar. Pt was transferred to inpatient psych for further evaluation.    # SLE  - home dose verified with daughter  - hydroxychloroquine 200 milliGRAM(s) Oral two times a day  - methotrexate 7.5 milliGRAM(s) Oral every week  - please obtain repeat EKG to assess QTc. (QTc was WNL on 10/26).   - outpt Rheum follow up     # Vit D def  Pt with Vit D def. Current Vit D at 25.5  - c/w 600U daily    # Bipolar d/o  Pt on depakote, restarted during inpatient admission  - care per primary team    Medicine will continue to follow, thank you.

## 2024-11-04 LAB
ALBUMIN SERPL ELPH-MCNC: 3.4 G/DL — SIGNIFICANT CHANGE UP (ref 3.3–5)
ALP SERPL-CCNC: 132 U/L — HIGH (ref 40–120)
ALT FLD-CCNC: 31 U/L — SIGNIFICANT CHANGE UP (ref 10–45)
ANION GAP SERPL CALC-SCNC: 9 MMOL/L — SIGNIFICANT CHANGE UP (ref 5–17)
AST SERPL-CCNC: 32 U/L — SIGNIFICANT CHANGE UP (ref 10–40)
BILIRUB SERPL-MCNC: <0.2 MG/DL — SIGNIFICANT CHANGE UP (ref 0.2–1.2)
BUN SERPL-MCNC: 39 MG/DL — HIGH (ref 7–23)
CALCIUM SERPL-MCNC: 9.1 MG/DL — SIGNIFICANT CHANGE UP (ref 8.4–10.5)
CHLORIDE SERPL-SCNC: 106 MMOL/L — SIGNIFICANT CHANGE UP (ref 96–108)
CO2 SERPL-SCNC: 22 MMOL/L — SIGNIFICANT CHANGE UP (ref 22–31)
CREAT SERPL-MCNC: 0.7 MG/DL — SIGNIFICANT CHANGE UP (ref 0.5–1.3)
EGFR: 94 ML/MIN/1.73M2 — SIGNIFICANT CHANGE UP
GLUCOSE SERPL-MCNC: 80 MG/DL — SIGNIFICANT CHANGE UP (ref 70–99)
HCT VFR BLD CALC: 39.9 % — SIGNIFICANT CHANGE UP (ref 34.5–45)
HGB BLD-MCNC: 12.2 G/DL — SIGNIFICANT CHANGE UP (ref 11.5–15.5)
MCHC RBC-ENTMCNC: 28.8 PG — SIGNIFICANT CHANGE UP (ref 27–34)
MCHC RBC-ENTMCNC: 30.6 G/DL — LOW (ref 32–36)
MCV RBC AUTO: 94.1 FL — SIGNIFICANT CHANGE UP (ref 80–100)
NRBC # BLD: 0 /100 WBCS — SIGNIFICANT CHANGE UP (ref 0–0)
PLATELET # BLD AUTO: 262 K/UL — SIGNIFICANT CHANGE UP (ref 150–400)
POTASSIUM SERPL-MCNC: 4.7 MMOL/L — SIGNIFICANT CHANGE UP (ref 3.5–5.3)
POTASSIUM SERPL-SCNC: 4.7 MMOL/L — SIGNIFICANT CHANGE UP (ref 3.5–5.3)
PROT SERPL-MCNC: 7.9 G/DL — SIGNIFICANT CHANGE UP (ref 6–8.3)
RBC # BLD: 4.24 M/UL — SIGNIFICANT CHANGE UP (ref 3.8–5.2)
RBC # FLD: 17.2 % — HIGH (ref 10.3–14.5)
SODIUM SERPL-SCNC: 137 MMOL/L — SIGNIFICANT CHANGE UP (ref 135–145)
WBC # BLD: 7.43 K/UL — SIGNIFICANT CHANGE UP (ref 3.8–10.5)
WBC # FLD AUTO: 7.43 K/UL — SIGNIFICANT CHANGE UP (ref 3.8–10.5)

## 2024-11-04 PROCEDURE — 99232 SBSQ HOSP IP/OBS MODERATE 35: CPT

## 2024-11-04 PROCEDURE — 99232 SBSQ HOSP IP/OBS MODERATE 35: CPT | Mod: GC

## 2024-11-04 PROCEDURE — 93010 ELECTROCARDIOGRAM REPORT: CPT

## 2024-11-04 RX ADMIN — Medication 1 TABLET(S): at 13:17

## 2024-11-04 RX ADMIN — Medication 600 UNIT(S): at 13:17

## 2024-11-04 RX ADMIN — HYDROXYCHLOROQUINE SULFATE 200 MILLIGRAM(S): 200 TABLET, FILM COATED ORAL at 22:11

## 2024-11-04 RX ADMIN — Medication 250 MILLIGRAM(S): at 22:11

## 2024-11-04 RX ADMIN — OLANZAPINE 10 MILLIGRAM(S): 20 TABLET ORAL at 22:10

## 2024-11-04 RX ADMIN — HYDROXYCHLOROQUINE SULFATE 200 MILLIGRAM(S): 200 TABLET, FILM COATED ORAL at 13:16

## 2024-11-04 NOTE — BH INPATIENT PSYCHIATRY PROGRESS NOTE - NSBHCHARTREVIEWVS_PSY_A_CORE FT
Vital Signs Last 24 Hrs  T(C): 36.8 (11-04-24 @ 08:55), Max: 36.8 (11-04-24 @ 08:55)  T(F): 98.2 (11-04-24 @ 08:55), Max: 98.2 (11-04-24 @ 08:55)  HR: 72 (11-04-24 @ 08:55) (72 - 86)  BP: 96/60 (11-04-24 @ 08:55) (96/60 - 117/70)  BP(mean): --  RR: 18 (11-04-24 @ 08:55) (18 - 18)  SpO2: 98% (11-04-24 @ 08:55) (98% - 99%)     Vital Signs Last 24 Hrs  T(C): 36.7 (11-04-24 @ 16:28), Max: 36.8 (11-04-24 @ 08:55)  T(F): 98 (11-04-24 @ 16:28), Max: 98.2 (11-04-24 @ 08:55)  HR: 87 (11-04-24 @ 16:28) (72 - 87)  BP: 112/68 (11-04-24 @ 16:28) (96/60 - 112/68)  BP(mean): --  RR: 18 (11-04-24 @ 08:55) (18 - 18)  SpO2: 99% (11-04-24 @ 16:28) (98% - 99%)

## 2024-11-04 NOTE — PROGRESS NOTE ADULT - ATTENDING COMMENTS
69 y.o F w/ PMHx of bipolar d/o, SLE, rheumatoid arthritis w/ initial admission to medicine for FTT, brought in by APS. Pt on no home meds for RA, bipolar or SLE despite being previously prescribed Plaquenil and methotrexate (for SLE) and depakote for Bipolar. Pt was transferred to inpatient psych for further evaluation.    CC: Pt seen w. , asking if she can go the bathroom without her walker given urinary urgency and often she has urinary incontinence because she is clumpsy and can't make it to the bathroom.  Pt definitely needs walker to get up from sitting position, and actually she can walk independently without assisted device.      # SLE  - home dose verified with daughter  - hydroxychloroquine 200 milliGRAM(s) Oral two times a day  - methotrexate 7.5 milliGRAM(s) Oral every week  - please obtain EKG to assess QTc. (QTc was WNL on 10/26).   - outpt Rheum follow up     # Vit D def  Pt with Vit D def. Current Vit D at 25.5  - c/w 600U daily    # Bipolar d/o  Pt on depakote, restarted during inpatient admission  - care per primary team    Medicine will continue to follow, thank you.
pt seen and examined with Dr. Mendoza, agree with assesment.     tolerating medication, RA meds restarted by team- she reported fingers pain are improved. deformities of finger from RA.   to check QTC- plaquanil can have prolong qtc
69 y.o F w/ PMHx of bipolar d/o, SLE, rheumatoid arthritis w/ initial admission to medicine for FTT, brought in by APS. Pt on no home meds for RA, bipolar or SLE despite being previously prescribed Plaquenil and methotrexate (for SLE) and depakote for Bipolar. Pt was transferred to inpatient psych for further evaluation.    # SLE  - home dose verified with daughter  - hydroxychloroquine 200 milliGRAM(s) Oral two times a day  - methotrexate 7.5 milliGRAM(s) Oral every week  - please obtain EKG today, assess QTc. (QTc was WNL on 10/26).   - outpt Rheum follow up     # Vit D def  Pt with Vit D def. Current Vit D at 25.5  - c/w 600U daily    # Bipolar d/o  Pt on depakote, restarted during inpatient admission  - care per primary team    Medicine will continue to follow, thank you.
seen with Dr. Mendoza   reported pain in the morning, not during encounter   waiting for EKG to check QTC .    clinically well appearing   medicine will continue to follow.
69 y.o F w/ PMHx of bipolar d/o, SLE, rheumatoid arthritis w/ initial admission to medicine for FTT, brought in by APS. Pt on no home meds for RA, bipolar or SLE despite being previously prescribed Plaquenil and methotrexate (for SLE) and depakote for Bipolar. Pt was transferred to inpatient psych for further evaluation.    CC: Pt seen with  and PA student Josemanuel     # SLE  - home dose verified with daughter  - hydroxychloroquine 200 milliGRAM(s) Oral two times a day  - methotrexate 7.5 milliGRAM(s) Oral every week  - repeat EKG w/ QTc 427ms today as reviewed (QTc was WNL on 10/26).   - outpt Rheum follow up     # Vit D def  Pt with Vit D def. Current Vit D at 25.5  - c/w 600U daily    # Bipolar d/o  Pt on depakote, restarted during inpatient admission  - care per primary team    Medicine team to signoff, please re-consult if we can of further assistance as d/w , NM Margoth and ACP Ernestina.  Thank you.

## 2024-11-04 NOTE — BH INPATIENT PSYCHIATRY PROGRESS NOTE - NSBHATTESTCOMMENTATTENDFT_PSY_A_CORE
I agree with notation. Patient noted to be labile, highly irritable, noted to be non-compliant with med regimen I agree with notation. Patient noted to be dysregulated, labile, easily irritated and with poor judgment and insight. Remains intermittently compliant with med regimen. TOO to be pursued. Medicine consult team continues to follow and recs are appreciated. EKG ordered.

## 2024-11-04 NOTE — BH TREATMENT PLAN - NSTXPSYCHOINTERSW_PSY_ALL_CORE
Liaise with family and collateral providers weekly for 1 hour or as needed for discharge planning purposes.  Liaise with family and collateral providers weekly for 1 hour or as needed for discharge planning purposes

## 2024-11-04 NOTE — BH INPATIENT PSYCHIATRY PROGRESS NOTE - NSBHASSESSSUMMFT_PSY_ALL_CORE
Patient is a 69 year old AA female, single with adult daughter, domiciled alone previously with [facility] services, with PPH of bipolar disorder and depression per daughter and PMH of rheumatoid arthritis and lupus, who was referred by APS due to concern for poor self-care and was admitted to the medicine service for failure to thrive. Psychiatry was consulted for a psychiatric evaluation for bipolar disorder and patient was transferred to [facility] under involuntary status due to poor self-care, mood lability, and psychosis (bizarre delusion of being poisoned by TONEY new[place]). On evaluation, patient is significantly calmer and more cooperative today than yesterday; however, patient appears religiously preoccupied and hyperverbal today. Patient briefly appeared internally preoccupied and appeared to be speaking about being poisoned with medications with an unknown entity in the room as her gaze was focused on a specific area; however, she immediately minimized it. She presents very differently from yesterday though per daughter, patient may be attempting to feign normalcy to expedite discharge. Per collateral from daughter and APS worker supervisor, patient has demonstrated poor self-care that has deteriorated significantly in the last several months resulting in weight loss, disorganized behavior, an untenable living situation, and poor hygiene. Patient has been very irritable and angry for several years, and has a history of good medication effect when hospitalized but poor compliance in the community. Per daughter, patient has also reported auditory hallucinations recently, but has not done so previously. Patient's current presentation may be secondary to decompensation in bipolar disorder as patient's symptoms are slightly manic (hyperverbal, mood lability, Judaism preoccupation) and psychotic (auditory hallucinations, paranoia). However, given longstanding history of poor self-care and marked change in behavior a decade ago, schizoaffective disorder is more likely at this time. Patient requires inpatient psychiatric admission as she is unable to care for herself in the setting of psychosis, poor insight and judgment, and medication non-compliance.    - Continue Depakote 250 bid for mood stabilization  - Increase Zyprexa to total of 2.5mg daily/10mg QHS for psychosis, consider titrating as patient was previously on 15 mg   - methotrexate and hydroxychlorquine   - Supplement meals with Ensure  - PT evaluation and treatment due to marked difficulty in ambulating requiring assistance  - 1:1 observation discontinued    10/28: Pt is "angry" at her daughter for "tricking" her into admission. Concurrently, pt claims to be happy about her admittance as she is hopeful she will receive the recourses she feels are necessary for her self-care. She admits to poor eating due to her malodorous home. She admits to prior AVH in the setting of visual hallucinations of zombies, "naked people, bugs and AH of people cussing and people in her wall at home "shooting up" with last presentation 2 days prior to evaluation. She states she is sleeping better since her arrival. She denies current SI/HI/AVH/PI.   MMSE: 23 - Mild Cognitive Impairment.   Ordered repeat UA and Cx after viewing abnormal results on 10/24  10/29: Pt is "happy" to be here and get the care she needs. She continues to have circumstantial thought but appears to be aware of this. She admits to eating and sleeping well stating she is eating and sleeping the best she has in some time. She admits to having an issue with a pt but states she is happy to move on from it. She denies SI/HI/AVH or PI.   10/30: Pt is "excited" to learn of new roommate which she hopes to byrd with in an attempt to feel better about missing her granddaughter. She reports having issues with continence and requests adult diapers as she is unable to make it to the bathroom in time. She described an event of incontinence occurring last night. After conversation about medication, pt agrees to comply, despite her reluctance due to belief her psychiatric medication is "poison." She is sleeping well (4 hours), and eating well, though she is concerned she is eating too much. Pt denies SI/HI/AVH/PI.   Urine cup provided for pt to obtain repeat UA and Cx  10/31: Pt is confused, stating she needed to get vitals done when she already had them done this AM. She is irritable when discussing medications and refuses to speak about it with writer, ending the conversation early, due to frustration.   Urine lab results reviewed and WNL.   11/1: She continues to be labile alternating from kind and cheerful to frustrated rapidly. It appears her mood switches are based off the topic discussed. She has tangential speech and guided the conversation on her own. She admits to smoking since the age of 14 years old, stating she currently smokes 1 pack a week, as a vice when stressed and enjoys accompanying the cigarette with a beer. She speaks of her relationships with family and positive relationships with other patient's on floor. When informed the writer would like to ask just a few more questions and to discuss medications, the patient became irritated and demanded the writer leave, using profanity as the patient exited. Patient was seen on milieu dancing to the sound of the phone, stating it was for exercise. She was also seen by staff walking around with a towel on her head. Added Olanzapine 2.5 mg to be dispensed in the AM. Patient denied the medication, as well as all other medications except for her thiamin and multivitamin. She also refused her lab work.   11/4: She continues to be labile alternating from kind and cheerful to frustrated rapidly. She continues to have tangential thought, bringing up topics on her own that frustrate her before blaming writer and using profanity until writer leaves. She often repeats words after saying them and says random words after her sentence is finished. She continues to refuse medication at times, stating she does not want poison in her body. She requests prednisone and risperidone, and gets frustrated when learning prednisone cannot be provided. When asked about taking her Depakote regularly, patient states in the past it caused her to see double and hallucinate, stating she saw "space monsters." Pt denies current AH and VH. Patient gets frustrated with writer, and asks her to leave using profanity as she exits. Before writer could speak, Ms. Trinidad opened the door and requested that writer leave, stating she will be suing the hospital so that she can leave. When asked if she is referring to a 72 hour letter, Ms. Trinidda ignores writer and continues to use profanity requesting writer leave.  Patient is a 69 year old AA female, single with adult daughter, domiciled alone previously with [facility] services, with PPH of bipolar disorder and depression per daughter and PMH of rheumatoid arthritis and lupus, who was referred by APS due to concern for poor self-care and was admitted to the medicine service for failure to thrive. Psychiatry was consulted for a psychiatric evaluation for bipolar disorder and patient was transferred to [facility] under involuntary status due to poor self-care, mood lability, and psychosis (bizarre delusion of being poisoned by TONE[place]). On evaluation, patient is significantly calmer and more cooperative today than yesterday; however, patient appears religiously preoccupied and hyperverbal today. Patient briefly appeared internally preoccupied and appeared to be speaking about being poisoned with medications with an unknown entity in the room as her gaze was focused on a specific area; however, she immediately minimized it. She presents very differently from yesterday though per daughter, patient may be attempting to feign normalcy to expedite discharge. Per collateral from daughter and APS worker supervisor, patient has demonstrated poor self-care that has deteriorated significantly in the last several months resulting in weight loss, disorganized behavior, an untenable living situation, and poor hygiene. Patient has been very irritable and angry for several years, and has a history of good medication effect when hospitalized but poor compliance in the community. Per daughter, patient has also reported auditory hallucinations recently, but has not done so previously. Patient's current presentation may be secondary to decompensation in bipolar disorder as patient's symptoms are slightly manic (hyperverbal, mood lability, Scientology preoccupation) and psychotic (auditory hallucinations, paranoia). However, given longstanding history of poor self-care and marked change in behavior a decade ago, schizoaffective disorder is more likely at this time. Patient requires inpatient psychiatric admission as she is unable to care for herself in the setting of psychosis, poor insight and judgment, and medication non-compliance.    - Continue Depakote 250 bid for mood stabilization  - Increase Zyprexa to total of 2.5mg daily/10mg QHS for psychosis, consider titrating as patient was previously on 15 mg   - methotrexate and hydroxychlorquine   - Supplement meals with Ensure  - PT evaluation and treatment due to marked difficulty in ambulating requiring assistance  - 1:1 observation discontinued    10/28: Pt is "angry" at her daughter for "tricking" her into admission. Concurrently, pt claims to be happy about her admittance as she is hopeful she will receive the recourses she feels are necessary for her self-care. She admits to poor eating due to her malodorous home. She admits to prior AVH in the setting of visual hallucinations of zombies, "naked people, bugs and AH of people cussing and people in her wall at home "shooting up" with last presentation 2 days prior to evaluation. She states she is sleeping better since her arrival. She denies current SI/HI/AVH/PI.   MMSE: 23 - Mild Cognitive Impairment.   Ordered repeat UA and Cx after viewing abnormal results on 10/24  10/29: Pt is "happy" to be here and get the care she needs. She continues to have circumstantial thought but appears to be aware of this. She admits to eating and sleeping well stating she is eating and sleeping the best she has in some time. She admits to having an issue with a pt but states she is happy to move on from it. She denies SI/HI/AVH or PI.   10/30: Pt is "excited" to learn of new roommate which she hopes to byrd with in an attempt to feel better about missing her granddaughter. She reports having issues with continence and requests adult diapers as she is unable to make it to the bathroom in time. She described an event of incontinence occurring last night. After conversation about medication, pt agrees to comply, despite her reluctance due to belief her psychiatric medication is "poison." She is sleeping well (4 hours), and eating well, though she is concerned she is eating too much. Pt denies SI/HI/AVH/PI.   Urine cup provided for pt to obtain repeat UA and Cx  10/31: Pt is confused, stating she needed to get vitals done when she already had them done this AM. She is irritable when discussing medications and refuses to speak about it with writer, ending the conversation early, due to frustration.   Urine lab results reviewed and WNL.   11/1: She continues to be labile alternating from kind and cheerful to frustrated rapidly. It appears her mood switches are based off the topic discussed. She has tangential speech and guided the conversation on her own. She admits to smoking since the age of 14 years old, stating she currently smokes 1 pack a week, as a vice when stressed and enjoys accompanying the cigarette with a beer. She speaks of her relationships with family and positive relationships with other patient's on floor. When informed the writer would like to ask just a few more questions and to discuss medications, the patient became irritated and demanded the writer leave, using profanity as the patient exited. Patient was seen on milieu dancing to the sound of the phone, stating it was for exercise. She was also seen by staff walking around with a towel on her head. Added Olanzapine 2.5 mg to be dispensed in the AM. Patient denied the medication, as well as all other medications except for her thiamin and multivitamin. She also refused her lab work.   11/4: She continues to be labile alternating from kind and cheerful to frustrated rapidly. She continues to have tangential thought, bringing up topics on her own that frustrate her before blaming writer and using profanity until writer leaves. She often repeats words after saying them and says random words after her sentence is finished. She continues to refuse medication at times, stating she does not want poison in her body. She requests prednisone and risperidone, and gets frustrated when learning prednisone cannot be provided. When asked about taking her Depakote regularly, patient states in the past it caused her to see double and hallucinate, stating she saw "space monsters." Pt denies current AH and VH. Patient gets frustrated with writer, and asks her to leave using profanity as she exits. Before writer could speak, Ms. Trinidad opened the door and requested that writer leave, stating she will be suing the hospital so that she can leave. When asked if she is referring to a 72 hour letter, Ms. Trinidad ignores writer and continues to use profanity requesting writer leave.   Will fill out treatment over objection paper work.

## 2024-11-04 NOTE — PROGRESS NOTE ADULT - ASSESSMENT
69 y.o F w/ PMHx of bipolar d/o, SLE, rheumatoid arthritis w/ initial admission to medicine for FTT, brought in by APS. Pt on no home meds for RA, bipolar or SLE despite being previously prescribed Plaquenil and methotrexate (for SLE) and depakote for Bipolar. Pt was transferred to inpatient psych for further evaluation.    # SLE  - home dose verified with daughter  - hydroxychloroquine 200 milliGRAM(s) Oral two times a day  - methotrexate 7.5 milliGRAM(s) Oral every week  - please obtain repeat EKG to assess QTc. (QTc was WNL on 10/26).   - outpt Rheum follow up     # Vit D def  Pt with Vit D def. Current Vit D at 25.5  - c/w 600U daily    # Bipolar d/o  Pt on depakote, restarted during inpatient admission  - care per primary team       69 y.o F w/ PMHx of bipolar d/o, SLE, rheumatoid arthritis w/ initial admission to medicine for FTT, brought in by APS. Pt on no home meds for RA, bipolar or SLE despite being previously prescribed Plaquenil and methotrexate (for SLE) and depakote for Bipolar. Pt was transferred to inpatient psych for further evaluation.    # SLE  - home dose verified with daughter  - hydroxychloroquine 200 milliGRAM(s) Oral two times a day  - methotrexate 7.5 milliGRAM(s) Oral every week  - 11/4 QTc   - outpt Rheum follow up     # Vit D def  Pt with Vit D def. Current Vit D at 25.5  - c/w 600U daily    # Bipolar d/o  Pt on depakote, restarted during inpatient admission  - care per primary team      Med consult will sign off at this time. Please reach out if you have further questions PRN

## 2024-11-04 NOTE — BH INPATIENT PSYCHIATRY PROGRESS NOTE - NSBHMETABOLIC_PSY_ALL_CORE_FT
BMI: BMI (kg/m2): 17.4 (10-29-24 @ 09:01)  HbA1c: A1C with Estimated Average Glucose Result: 5.7 % (10-26-24 @ 22:00)    Glucose:   BP: 96/60 (11-04-24 @ 08:55) (93/61 - 144/77)Vital Signs Last 24 Hrs  T(C): 36.8 (11-04-24 @ 08:55), Max: 36.8 (11-04-24 @ 08:55)  T(F): 98.2 (11-04-24 @ 08:55), Max: 98.2 (11-04-24 @ 08:55)  HR: 72 (11-04-24 @ 08:55) (72 - 86)  BP: 96/60 (11-04-24 @ 08:55) (96/60 - 117/70)  BP(mean): --  RR: 18 (11-04-24 @ 08:55) (18 - 18)  SpO2: 98% (11-04-24 @ 08:55) (98% - 99%)      Lipid Panel: Date/Time: 10-26-24 @ 22:00  Cholesterol, Serum: 158  LDL Cholesterol Calculated: 85  HDL Cholesterol, Serum: 62  Total Cholesterol/HDL Ration Measurement: --  Triglycerides, Serum: 54   BMI: BMI (kg/m2): 17.4 (10-29-24 @ 09:01)  HbA1c: A1C with Estimated Average Glucose Result: 5.7 % (10-26-24 @ 22:00)    Glucose:   BP: 112/68 (11-04-24 @ 16:28) (96/60 - 144/77)Vital Signs Last 24 Hrs  T(C): 36.7 (11-04-24 @ 16:28), Max: 36.8 (11-04-24 @ 08:55)  T(F): 98 (11-04-24 @ 16:28), Max: 98.2 (11-04-24 @ 08:55)  HR: 87 (11-04-24 @ 16:28) (72 - 87)  BP: 112/68 (11-04-24 @ 16:28) (96/60 - 112/68)  BP(mean): --  RR: 18 (11-04-24 @ 08:55) (18 - 18)  SpO2: 99% (11-04-24 @ 16:28) (98% - 99%)      Lipid Panel: Date/Time: 10-26-24 @ 22:00  Cholesterol, Serum: 158  LDL Cholesterol Calculated: 85  HDL Cholesterol, Serum: 62  Total Cholesterol/HDL Ration Measurement: --  Triglycerides, Serum: 54

## 2024-11-04 NOTE — BH INPATIENT PSYCHIATRY PROGRESS NOTE - CURRENT MEDICATION
MEDICATIONS  (STANDING):  cholecalciferol 600 Unit(s) Oral daily  divalproex  milliGRAM(s) Oral two times a day  folic acid 1 milliGRAM(s) Oral daily  hydroxychloroquine 200 milliGRAM(s) Oral two times a day  methotrexate 7.5 milliGRAM(s) Oral every week  multivitamin 1 Tablet(s) Oral daily  OLANZapine 10 milliGRAM(s) Oral at bedtime  thiamine 100 milliGRAM(s) Oral daily    MEDICATIONS  (PRN):  aluminum hydroxide/magnesium hydroxide/simethicone Suspension 30 milliLiter(s) Oral every 6 hours PRN Dyspepsia  ibuprofen  Tablet. 200 milliGRAM(s) Oral three times a day PRN Moderate Pain (4 - 6)  OLANZapine 5 milliGRAM(s) Oral every 8 hours PRN agitation   MEDICATIONS  (STANDING):  cholecalciferol 600 Unit(s) Oral daily  divalproex  milliGRAM(s) Oral two times a day  folic acid 1 milliGRAM(s) Oral daily  hydroxychloroquine 200 milliGRAM(s) Oral two times a day  methotrexate 7.5 milliGRAM(s) Oral <User Schedule>  multivitamin 1 Tablet(s) Oral daily  OLANZapine 10 milliGRAM(s) Oral at bedtime  thiamine 100 milliGRAM(s) Oral daily    MEDICATIONS  (PRN):  aluminum hydroxide/magnesium hydroxide/simethicone Suspension 30 milliLiter(s) Oral every 6 hours PRN Dyspepsia  ibuprofen  Tablet. 200 milliGRAM(s) Oral three times a day PRN Moderate Pain (4 - 6)  OLANZapine 5 milliGRAM(s) Oral every 8 hours PRN agitation

## 2024-11-04 NOTE — PROGRESS NOTE ADULT - SUBJECTIVE AND OBJECTIVE BOX
SUBJECTIVE:  Patient seen and examined at bedside.  ROS: Patient denies h/n/v/d, fever, chills, cp, palpitations, sob, abd pain, leg swelling, rashes, dysuria, and changes in BM.     Vital Signs Last 12 Hrs  T(F): 97.6 (11-02-24 @ 09:06), Max: 97.6 (11-02-24 @ 09:06)  HR: 84 (11-02-24 @ 09:06) (84 - 84)  BP: 93/61 (11-02-24 @ 09:06) (93/61 - 93/61)  BP(mean): --  RR: --  SpO2: 96% (11-02-24 @ 09:06) (96% - 96%)  I&O's Summary      PHYSICAL EXAM:  Constitutional: NAD, comfortable in bed.  HEENT: NC/AT, PERRLA, EOMI, no conjunctival pallor or scleral icterus, MMM  Neck: Supple, no JVD  Respiratory: CTA B/L. No w/r/r.   Cardiovascular: RRR, normal S1 and S2, no m/r/g.   Gastrointestinal: +BS, soft NTND, no guarding or rebound tenderness, no palpable masses   Extremities: wwp; no cyanosis, clubbing or edema.   Vascular: Pulses equal and strong throughout.   Neurological: AAOx3, no CN deficits, strength and sensation intact throughout.   Skin: No gross skin abnormalities or rashes        LABS:                  RADIOLOGY & ADDITIONAL TESTS:    MEDICATIONS  (STANDING):  cholecalciferol 600 Unit(s) Oral daily  divalproex  milliGRAM(s) Oral two times a day  folic acid 1 milliGRAM(s) Oral daily  hydroxychloroquine 200 milliGRAM(s) Oral two times a day  methotrexate 7.5 milliGRAM(s) Oral every week  multivitamin 1 Tablet(s) Oral daily  OLANZapine 10 milliGRAM(s) Oral at bedtime  thiamine 100 milliGRAM(s) Oral daily    MEDICATIONS  (PRN):  aluminum hydroxide/magnesium hydroxide/simethicone Suspension 30 milliLiter(s) Oral every 6 hours PRN Dyspepsia  ibuprofen  Tablet. 200 milliGRAM(s) Oral three times a day PRN Moderate Pain (4 - 6)  LORazepam     Tablet 1 milliGRAM(s) Oral every 1 hour PRN CIWA-Ar score 8 or greater  OLANZapine 5 milliGRAM(s) Oral every 8 hours PRN agitation  
Patient is a 69y old  Female who presents with a chief complaint of RECURRENT FALLS    PSY     (28 Oct 2024 15:50)    INTERVAL EVENTS: NAEON    SUBJECTIVE:  Patient was seen and examined at bedside. Pleasant, no complaints.     Review of systems: No fever, chills, dizziness, HA, Changes in vision, CP, dyspnea, nausea or vomiting, dysuria, changes in bowel movements, LE edema. Rest of 12 point Review of systems negative unless otherwise documented elsewhere in note.     Diet, Regular:   Supplement Feeding Modality:  Oral  Ensure Plus High Protein Cans or Servings Per Day:  1       Frequency:  Three Times a day (10-29-24 @ 15:45) [Active]      MEDICATIONS:  MEDICATIONS  (STANDING):  cholecalciferol 600 Unit(s) Oral daily  divalproex  milliGRAM(s) Oral two times a day  folic acid 1 milliGRAM(s) Oral daily  hydroxychloroquine 200 milliGRAM(s) Oral two times a day  methotrexate 7.5 milliGRAM(s) Oral every week  multivitamin 1 Tablet(s) Oral daily  OLANZapine 10 milliGRAM(s) Oral at bedtime  thiamine 100 milliGRAM(s) Oral daily    MEDICATIONS  (PRN):  aluminum hydroxide/magnesium hydroxide/simethicone Suspension 30 milliLiter(s) Oral every 6 hours PRN Dyspepsia  ibuprofen  Tablet. 200 milliGRAM(s) Oral three times a day PRN Moderate Pain (4 - 6)  OLANZapine 5 milliGRAM(s) Oral every 8 hours PRN agitation      Allergies    penicillin (Anaphylaxis)    Intolerances        OBJECTIVE:  Vital Signs Last 24 Hrs  T(C): 36.6 (03 Nov 2024 10:09), Max: 36.9 (02 Nov 2024 17:10)  T(F): 97.9 (03 Nov 2024 10:09), Max: 98.4 (02 Nov 2024 17:10)  HR: 60 (03 Nov 2024 10:09) (60 - 97)  BP: 120/75 (03 Nov 2024 10:09) (120/75 - 144/77)  BP(mean): --  RR: 18 (02 Nov 2024 17:10) (18 - 18)  SpO2: 99% (03 Nov 2024 10:09) (99% - 100%)    Parameters below as of 02 Nov 2024 17:10  Patient On (Oxygen Delivery Method): room air      I&O's Summary      PHYSICAL EXAM:  Gen: Reclining in bed at time of exam, appears stated age  HEENT: NCAT, MMM, clear OP  Neck: supple, trachea at midline  CV: RRR, +S1/S2  Pulm: adequate respiratory effort, no increase in work of breathing  Abd: soft, NTND  Skin: warm and dry,   Ext: WWP, no LE edema  Neuro: AOx3, no gross focal neurological deficits  Psych: affect and behavior appropriate, pleasant at time of interview  :     LABS:              CAPILLARY BLOOD GLUCOSE            MICRODATA:      RADIOLOGY/OTHER STUDIES:    PCP  Pharmacy:   Emergency contact:   
SUBJECTIVE:  Patient seen and examined at bedside.  ROS: Patient denies h/n/v/d, fever, chills, cp, palpitations, sob, abd pain, leg swelling, rashes, dysuria, and changes in BM. +Intermittent hand pain iso RA    Vital Signs Last 12 Hrs  T(F): 97.5 (10-31-24 @ 09:45), Max: 97.5 (10-31-24 @ 09:45)  HR: 83 (10-31-24 @ 09:45) (83 - 83)  BP: 137/76 (10-31-24 @ 09:45) (137/76 - 137/76)  BP(mean): --  RR: 17 (10-31-24 @ 09:45) (17 - 17)  SpO2: 100% (10-31-24 @ 09:45) (100% - 100%)  I&O's Summary      PHYSICAL EXAM:  Constitutional: NAD, comfortable in bed.  HEENT: NC/AT, PERRLA, EOMI, no conjunctival pallor or scleral icterus, MMM  Neck: Supple, no JVD  Respiratory: CTA B/L. No w/r/r.   Cardiovascular: RRR, normal S1 and S2, no m/r/g.   Gastrointestinal: +BS, soft NTND, no guarding or rebound tenderness, no palpable masses   Extremities: wwp; no cyanosis, clubbing or edema.   Vascular: Pulses equal and strong throughout.   Neurological: AAOx3, no CN deficits, strength and sensation intact throughout.   Skin: No gross skin abnormalities or rashes        LABS:                        10.2   5.67  )-----------( 335      ( 30 Oct 2024 17:23 )             32.8     10    139  |  105  |  32[H]  ----------------------------<  106[H]  4.5   |  26  |  0.63    Ca    9.0      30 Oct 2024 17:23    TPro  7.1  /  Alb  3.5  /  TBili  <0.2  /  DBili  x   /  AST  25  /  ALT  14  /  AlkPhos  123[H]  10-      Urinalysis Basic - ( 30 Oct 2024 20:13 )    Color: Yellow / Appearance: Clear / S.018 / pH: x  Gluc: x / Ketone: Negative mg/dL  / Bili: Negative / Urobili: 0.2 mg/dL   Blood: x / Protein: Negative mg/dL / Nitrite: Negative   Leuk Esterase: Negative / RBC: x / WBC x   Sq Epi: x / Non Sq Epi: x / Bacteria: x          RADIOLOGY & ADDITIONAL TESTS:    MEDICATIONS  (STANDING):  cholecalciferol 600 Unit(s) Oral daily  divalproex  milliGRAM(s) Oral two times a day  folic acid 1 milliGRAM(s) Oral daily  hydroxychloroquine 200 milliGRAM(s) Oral two times a day  methotrexate 7.5 milliGRAM(s) Oral every week  multivitamin 1 Tablet(s) Oral daily  OLANZapine 10 milliGRAM(s) Oral at bedtime  thiamine 100 milliGRAM(s) Oral daily    MEDICATIONS  (PRN):  aluminum hydroxide/magnesium hydroxide/simethicone Suspension 30 milliLiter(s) Oral every 6 hours PRN Dyspepsia  ibuprofen  Tablet. 200 milliGRAM(s) Oral three times a day PRN Moderate Pain (4 - 6)  LORazepam     Tablet 1 milliGRAM(s) Oral every 1 hour PRN CIWA-Ar score 8 or greater  OLANZapine 5 milliGRAM(s) Oral every 8 hours PRN agitation  
SUBJECTIVE:  Patient seen and examined at bedside.  ROS: Patient denies h/n/v/d, fever, chills, cp, palpitations, sob, abd pain, leg swelling, rashes, dysuria, and changes in BM.     Vital Signs Last 12 Hrs  T(F): 96.3 (24 @ 09:21), Max: 96.3 (24 @ 09:21)  HR: 79 (24 @ 09:21) (79 - 79)  BP: 126/80 (24 @ 09:21) (126/80 - 126/80)  BP(mean): --  RR: 18 (24 @ 09:21) (18 - 18)  SpO2: 100% (24 @ 09:21) (100% - 100%)  I&O's Summary      PHYSICAL EXAM:  Constitutional: NAD, comfortable in bed.  HEENT: NC/AT, PERRLA, EOMI, no conjunctival pallor or scleral icterus, MMM  Neck: Supple, no JVD  Respiratory: CTA B/L. No w/r/r.   Cardiovascular: RRR, normal S1 and S2, no m/r/g.   Gastrointestinal: +BS, soft NTND, no guarding or rebound tenderness, no palpable masses   Extremities: wwp; no cyanosis, clubbing or edema.   Vascular: Pulses equal and strong throughout.   Neurological: AAOx3, no CN deficits, strength and sensation intact throughout.   Skin: No gross skin abnormalities or rashes        LABS:                        10.2   5.67  )-----------( 335      ( 30 Oct 2024 17:23 )             32.8     10    139  |  105  |  32[H]  ----------------------------<  106[H]  4.5   |  26  |  0.63    Ca    9.0      30 Oct 2024 17:23    TPro  7.1  /  Alb  3.5  /  TBili  <0.2  /  DBili  x   /  AST  25  /  ALT  14  /  AlkPhos  123[H]  10-30      Urinalysis Basic - ( 30 Oct 2024 20:13 )    Color: Yellow / Appearance: Clear / S.018 / pH: x  Gluc: x / Ketone: Negative mg/dL  / Bili: Negative / Urobili: 0.2 mg/dL   Blood: x / Protein: Negative mg/dL / Nitrite: Negative   Leuk Esterase: Negative / RBC: x / WBC x   Sq Epi: x / Non Sq Epi: x / Bacteria: x          RADIOLOGY & ADDITIONAL TESTS:    MEDICATIONS  (STANDING):  cholecalciferol 600 Unit(s) Oral daily  divalproex  milliGRAM(s) Oral two times a day  folic acid 1 milliGRAM(s) Oral daily  hydroxychloroquine 200 milliGRAM(s) Oral two times a day  methotrexate 7.5 milliGRAM(s) Oral every week  multivitamin 1 Tablet(s) Oral daily  OLANZapine 10 milliGRAM(s) Oral at bedtime  OLANZapine 2.5 milliGRAM(s) Oral once  thiamine 100 milliGRAM(s) Oral daily    MEDICATIONS  (PRN):  aluminum hydroxide/magnesium hydroxide/simethicone Suspension 30 milliLiter(s) Oral every 6 hours PRN Dyspepsia  ibuprofen  Tablet. 200 milliGRAM(s) Oral three times a day PRN Moderate Pain (4 - 6)  LORazepam     Tablet 1 milliGRAM(s) Oral every 1 hour PRN CIWA-Ar score 8 or greater  OLANZapine 5 milliGRAM(s) Oral every 8 hours PRN agitation  
SUBJECTIVE:  Patient seen and examined at bedside.  ROS: Patient denies h/n/v/d, fever, chills, cp, palpitations, sob, abd pain, leg swelling, rashes, dysuria, and changes in BM.     Vital Signs Last 12 Hrs  T(F): 97.6 (10-29-24 @ 16:42), Max: 97.7 (10-29-24 @ 09:01)  HR: 96 (10-29-24 @ 16:42) (79 - 96)  BP: 113/66 (10-29-24 @ 16:42) (112/71 - 113/66)  BP(mean): --  RR: 18 (10-29-24 @ 16:42) (18 - 18)  SpO2: 97% (10-29-24 @ 16:42) (97% - 99%)  I&O's Summary      PHYSICAL EXAM:  Constitutional: NAD, comfortable in bed.  HEENT: NC/AT, PERRLA, EOMI, no conjunctival pallor or scleral icterus, MMM  Neck: Supple, no JVD  Respiratory: CTA B/L. No w/r/r.   Cardiovascular: RRR, normal S1 and S2, no m/r/g.   Gastrointestinal: +BS, soft NTND, no guarding or rebound tenderness, no palpable masses   Extremities: wwp; no cyanosis, clubbing or edema. Hands with characteristic bone changes of rheumatoid arthritis  Vascular: Pulses equal and strong throughout.   Neurological: AAOx3, no CN deficits, strength and sensation intact throughout.   Skin: No gross skin abnormalities or rashes        LABS:                  RADIOLOGY & ADDITIONAL TESTS:    MEDICATIONS  (STANDING):  cholecalciferol 600 Unit(s) Oral daily  divalproex  milliGRAM(s) Oral two times a day  folic acid 1 milliGRAM(s) Oral daily  hydroxychloroquine 200 milliGRAM(s) Oral two times a day  methotrexate 7.5 milliGRAM(s) Oral every week  multivitamin 1 Tablet(s) Oral daily  OLANZapine 7.5 milliGRAM(s) Oral at bedtime  thiamine 100 milliGRAM(s) Oral daily    MEDICATIONS  (PRN):  aluminum hydroxide/magnesium hydroxide/simethicone Suspension 30 milliLiter(s) Oral every 6 hours PRN Dyspepsia  ibuprofen  Tablet. 200 milliGRAM(s) Oral three times a day PRN Moderate Pain (4 - 6)  LORazepam     Tablet 1 milliGRAM(s) Oral every 1 hour PRN CIWA-Ar score 8 or greater  OLANZapine 5 milliGRAM(s) Oral every 8 hours PRN agitation  
SUBJECTIVE:  Patient seen and examined at bedside.  ROS: Patient denies h/n/v/d, fever, chills, cp, palpitations, sob, abd pain, leg swelling, rashes, dysuria, and changes in BM.     Vital Signs Last 12 Hrs  T(F): 97.7 (10-30-24 @ 09:49), Max: 97.7 (10-30-24 @ 09:49)  HR: 75 (10-30-24 @ 09:49) (75 - 75)  BP: 127/75 (10-30-24 @ 09:49) (127/75 - 127/75)  BP(mean): --  RR: 18 (10-30-24 @ 09:49) (18 - 18)  SpO2: 95% (10-30-24 @ 09:49) (95% - 95%)  I&O's Summary      PHYSICAL EXAM:  Constitutional: NAD, comfortable in bed.  HEENT: NC/AT, PERRLA, EOMI, no conjunctival pallor or scleral icterus, MMM  Neck: Supple, no JVD  Respiratory: CTA B/L. No w/r/r.   Cardiovascular: RRR, normal S1 and S2, no m/r/g.   Gastrointestinal: +BS, soft NTND, no guarding or rebound tenderness, no palpable masses   Extremities: wwp; no cyanosis, clubbing or edema.   Vascular: Pulses equal and strong throughout.   Neurological: AAOx3, no CN deficits, strength and sensation intact throughout.   Skin: No gross skin abnormalities or rashes        LABS:                  RADIOLOGY & ADDITIONAL TESTS:    MEDICATIONS  (STANDING):  cholecalciferol 600 Unit(s) Oral daily  divalproex  milliGRAM(s) Oral two times a day  folic acid 1 milliGRAM(s) Oral daily  hydroxychloroquine 200 milliGRAM(s) Oral two times a day  methotrexate 7.5 milliGRAM(s) Oral every week  multivitamin 1 Tablet(s) Oral daily  OLANZapine 7.5 milliGRAM(s) Oral at bedtime  thiamine 100 milliGRAM(s) Oral daily    MEDICATIONS  (PRN):  aluminum hydroxide/magnesium hydroxide/simethicone Suspension 30 milliLiter(s) Oral every 6 hours PRN Dyspepsia  ibuprofen  Tablet. 200 milliGRAM(s) Oral three times a day PRN Moderate Pain (4 - 6)  LORazepam     Tablet 1 milliGRAM(s) Oral every 1 hour PRN CIWA-Ar score 8 or greater  OLANZapine 5 milliGRAM(s) Oral every 8 hours PRN agitation  
SUBJECTIVE:  Patient seen and examined at bedside.  ROS: Patient denies h/n/v/d, fever, chills, cp, palpitations, sob, abd pain, leg swelling, rashes, dysuria, and changes in BM.     Vital Signs Last 12 Hrs  T(F): 98.2 (11-04-24 @ 08:55), Max: 98.2 (11-04-24 @ 08:55)  HR: 72 (11-04-24 @ 08:55) (72 - 72)  BP: 96/60 (11-04-24 @ 08:55) (96/60 - 96/60)  BP(mean): --  RR: 18 (11-04-24 @ 08:55) (18 - 18)  SpO2: 98% (11-04-24 @ 08:55) (98% - 98%)  I&O's Summary      PHYSICAL EXAM:  Constitutional: NAD, comfortable in bed.  HEENT: NC/AT, PERRLA, EOMI, no conjunctival pallor or scleral icterus, MMM  Neck: Supple, no JVD  Respiratory: CTA B/L. No w/r/r.   Cardiovascular: RRR, normal S1 and S2, no m/r/g.   Gastrointestinal: +BS, soft NTND, no guarding or rebound tenderness, no palpable masses   Extremities: wwp; no cyanosis, clubbing or edema.   Vascular: Pulses equal and strong throughout.   Neurological: AAOx3, no CN deficits, strength and sensation intact throughout.   Skin: No gross skin abnormalities or rashes        LABS:                        12.2   7.43  )-----------( 262      ( 04 Nov 2024 10:00 )             39.9     11-04    137  |  106  |  39[H]  ----------------------------<  80  4.7   |  22  |  0.70    Ca    9.1      04 Nov 2024 10:00    TPro  7.9  /  Alb  3.4  /  TBili  <0.2  /  DBili  x   /  AST  32  /  ALT  31  /  AlkPhos  132[H]  11-04      Urinalysis Basic - ( 04 Nov 2024 10:00 )    Color: x / Appearance: x / SG: x / pH: x  Gluc: 80 mg/dL / Ketone: x  / Bili: x / Urobili: x   Blood: x / Protein: x / Nitrite: x   Leuk Esterase: x / RBC: x / WBC x   Sq Epi: x / Non Sq Epi: x / Bacteria: x          RADIOLOGY & ADDITIONAL TESTS:    MEDICATIONS  (STANDING):  cholecalciferol 600 Unit(s) Oral daily  divalproex  milliGRAM(s) Oral two times a day  folic acid 1 milliGRAM(s) Oral daily  hydroxychloroquine 200 milliGRAM(s) Oral two times a day  methotrexate 7.5 milliGRAM(s) Oral every week  multivitamin 1 Tablet(s) Oral daily  OLANZapine 10 milliGRAM(s) Oral at bedtime  thiamine 100 milliGRAM(s) Oral daily    MEDICATIONS  (PRN):  aluminum hydroxide/magnesium hydroxide/simethicone Suspension 30 milliLiter(s) Oral every 6 hours PRN Dyspepsia  ibuprofen  Tablet. 200 milliGRAM(s) Oral three times a day PRN Moderate Pain (4 - 6)  OLANZapine 5 milliGRAM(s) Oral every 8 hours PRN agitation

## 2024-11-04 NOTE — BH INPATIENT PSYCHIATRY PROGRESS NOTE - NSBHFUPINTERVALHXFT_PSY_A_CORE
On presentation, patient appeared cheerful, dancing in her room. She continues to be labile alternating from kind and cheerful to frustrated rapidly. She continues to have tangential thought, bringing up topics on her own that frustrate her before blaming writer and using profanity until writer leaves. She often repeats words after saying them and says random words after her sentence is finished.     When speaking with Ms. Trinidad, she discusses her room change, stating she likes the room better and feels she was moved because of her age. She goes on to mention wanting to get a hotel and have everything about "me me me." She states this will be a vacation for her to get a break from everyone. When asked about her weekend, Ms. Trinidad states it was a wonderful weekend, and goes on to explain this is so because she was able to help her "children," two younger patients on the floor that remind her of her family members. Though she speaks with them regularly, she refers to them as "tall girl" and "little girl." She goes on to state she enjoyed helping them as she learned a lot about herself and states she learned "20-30 years of knowledge in 10 days." Ms. Trinidad went on to discuss the "vampires" that left a needle in her for "24 hours" when discussing the blood work that was collected from her this AM. She states "my arms are crying out and saying 'get me out'." She demands writer tell her when the blood work will be discontinued and is frustrated to learn it has to do with her methotrexate. She states she does not like the medication she is on, and requested prednisone, getting frustrated when learning this cannot be a long-term medication for her. When discussing her current medications, Ms. Trinidad first agrees to take medications for her SLE and RA, but retracts this statement and agrees to take vitamins, as she does not want poison in her body any longer. Ms. Trinidad goes on to request Risperidone, as she feels she was able to sleep well with it. When asked about taking her Depakote regularly, patient states in the past it caused her to see double and hallucinate, stating she saw "space monsters." Pt denies current AH and VH. Before writer could speak further, Ms. Trinidad expressed frustration with the conversation and asks when she will be discharged from the hospital. She asks to know who her primary provider is, and gets frustrated when hearing the NP she has been meeting with is. She states that the NP is "a make believe doctor" and states that the physician from her medical team will be the only provider she will speak to moving forward. Before writer could speak, Ms. Trinidad opened the door and requested that writer leave, stating she will be suing the hospital so that she can leave. When asked if she is referring to a 72 hour letter, Ms. Trinidad ignores writer and continues to use profanity requesting writer leave.  On presentation, patient appeared cheerful, dancing in her room. She continues to be labile alternating from kind and cheerful to frustrated rapidly. She continues to have tangential thought, bringing up topics on her own that frustrate her before blaming writer and using profanity until writer leaves. She often repeats words after saying them and says random words after her sentence is finished.     When speaking with Ms. Trinidad, she discusses her room change, stating she likes the room better and feels she was moved because of her age. She goes on to mention wanting to get a hotel and have everything about "me me me." She states this will be a vacation for her to get a break from everyone. When asked about her weekend, Ms. Trinidad states it was a wonderful weekend, and goes on to explain this is so because she was able to help her "children," two younger patients on the floor that remind her of her family members. Though she speaks with them regularly, she refers to them as "tall girl" and "little girl." She goes on to state she enjoyed helping them as she learned a lot about herself and states she learned "20-30 years of knowledge in 10 days." Ms. Trinidad went on to discuss the "vampires" that left a needle in her for "24 hours" when discussing the blood work that was collected from her this AM. She states "my arms are crying out and saying 'get me out'." She demands writer tell her when the blood work will be discontinued and is frustrated to learn it has to do with her methotrexate. She states she does not like the medication she is on, and requested prednisone, getting frustrated when learning this cannot be a long-term medication for her. When discussing her current medications, Ms. Trinidad first agrees to take medications for her SLE and RA, but retracts this statement and agrees to take vitamins, as she does not want poison in her body any longer. Ms. Trinidad goes on to request Risperidone, as she feels she was able to sleep well with it. When asked about taking her Depakote regularly, patient states in the past it caused her to see double and hallucinate, stating she saw "space monsters." Pt denies current AH and VH. Before writer could speak further, Ms. Trinidad expressed frustration with the conversation and asks when she will be discharged from the hospital. She asks to know who her primary provider is, and gets frustrated when hearing the NP she has been meeting with is. She states that the NP is "a make believe doctor" and states that the physician from her medical team will be the only provider she will speak to moving forward. Before writer could speak, Ms. Trinidad opened the door and requested that writer leave, stating she will be suing the hospital so that she can leave. When asked if she is referring to a 72 hour letter, Ms. Trinidad ignores writer and continues to use profanity requesting writer leave.     Will move forward with treatment over objection.

## 2024-11-05 DIAGNOSIS — R62.7 ADULT FAILURE TO THRIVE: ICD-10-CM

## 2024-11-05 DIAGNOSIS — M06.9 RHEUMATOID ARTHRITIS, UNSPECIFIED: ICD-10-CM

## 2024-11-05 DIAGNOSIS — M32.9 SYSTEMIC LUPUS ERYTHEMATOSUS, UNSPECIFIED: ICD-10-CM

## 2024-11-05 DIAGNOSIS — Z91.81 HISTORY OF FALLING: ICD-10-CM

## 2024-11-05 DIAGNOSIS — D64.9 ANEMIA, UNSPECIFIED: ICD-10-CM

## 2024-11-05 DIAGNOSIS — R54 AGE-RELATED PHYSICAL DEBILITY: ICD-10-CM

## 2024-11-05 DIAGNOSIS — F10.10 ALCOHOL ABUSE, UNCOMPLICATED: ICD-10-CM

## 2024-11-05 DIAGNOSIS — R32 UNSPECIFIED URINARY INCONTINENCE: ICD-10-CM

## 2024-11-05 DIAGNOSIS — R64 CACHEXIA: ICD-10-CM

## 2024-11-05 DIAGNOSIS — E55.9 VITAMIN D DEFICIENCY, UNSPECIFIED: ICD-10-CM

## 2024-11-05 DIAGNOSIS — E43 UNSPECIFIED SEVERE PROTEIN-CALORIE MALNUTRITION: ICD-10-CM

## 2024-11-05 PROCEDURE — 99232 SBSQ HOSP IP/OBS MODERATE 35: CPT

## 2024-11-05 RX ADMIN — Medication 1 TABLET(S): at 11:21

## 2024-11-05 RX ADMIN — HYDROXYCHLOROQUINE SULFATE 200 MILLIGRAM(S): 200 TABLET, FILM COATED ORAL at 11:18

## 2024-11-05 RX ADMIN — Medication 250 MILLIGRAM(S): at 22:22

## 2024-11-05 RX ADMIN — OLANZAPINE 10 MILLIGRAM(S): 20 TABLET ORAL at 22:22

## 2024-11-05 RX ADMIN — METHOTREXATE 7.5 MILLIGRAM(S): 2.5 TABLET ORAL at 22:21

## 2024-11-05 NOTE — BH INPATIENT PSYCHIATRY PROGRESS NOTE - NSBHCHARTREVIEWVS_PSY_A_CORE FT
Vital Signs Last 24 Hrs  T(C): 37.1 (11-05-24 @ 08:51), Max: 37.1 (11-05-24 @ 08:51)  T(F): 98.7 (11-05-24 @ 08:51), Max: 98.7 (11-05-24 @ 08:51)  HR: 87 (11-05-24 @ 08:51) (87 - 87)  BP: 119/73 (11-05-24 @ 08:51) (112/68 - 119/73)  BP(mean): --  RR: --  SpO2: 98% (11-05-24 @ 08:51) (98% - 99%)     Vital Signs Last 24 Hrs  T(C): 36.7 (11-06-24 @ 09:30), Max: 36.7 (11-06-24 @ 09:30)  T(F): 98 (11-06-24 @ 09:30), Max: 98 (11-06-24 @ 09:30)  HR: 96 (11-06-24 @ 09:30) (96 - 96)  BP: 121/76 (11-06-24 @ 09:30) (121/76 - 121/76)  BP(mean): --  RR: 18 (11-06-24 @ 09:30) (18 - 18)  SpO2: 98% (11-06-24 @ 09:30) (98% - 98%)

## 2024-11-05 NOTE — BH INPATIENT PSYCHIATRY PROGRESS NOTE - CURRENT MEDICATION
MEDICATIONS  (STANDING):  cholecalciferol 600 Unit(s) Oral daily  divalproex  milliGRAM(s) Oral two times a day  folic acid 1 milliGRAM(s) Oral daily  hydroxychloroquine 200 milliGRAM(s) Oral two times a day  methotrexate 7.5 milliGRAM(s) Oral <User Schedule>  multivitamin 1 Tablet(s) Oral daily  OLANZapine 10 milliGRAM(s) Oral at bedtime  thiamine 100 milliGRAM(s) Oral daily    MEDICATIONS  (PRN):  aluminum hydroxide/magnesium hydroxide/simethicone Suspension 30 milliLiter(s) Oral every 6 hours PRN Dyspepsia  ibuprofen  Tablet. 200 milliGRAM(s) Oral three times a day PRN Moderate Pain (4 - 6)  OLANZapine 5 milliGRAM(s) Oral every 8 hours PRN agitation

## 2024-11-05 NOTE — BH INPATIENT PSYCHIATRY PROGRESS NOTE - NSBHMETABOLIC_PSY_ALL_CORE_FT
BMI: BMI (kg/m2): 18.5 (11-05-24 @ 08:51)  HbA1c: A1C with Estimated Average Glucose Result: 5.7 % (10-26-24 @ 22:00)    Glucose:   BP: 119/73 (11-05-24 @ 08:51) (96/60 - 144/77)Vital Signs Last 24 Hrs  T(C): 37.1 (11-05-24 @ 08:51), Max: 37.1 (11-05-24 @ 08:51)  T(F): 98.7 (11-05-24 @ 08:51), Max: 98.7 (11-05-24 @ 08:51)  HR: 87 (11-05-24 @ 08:51) (87 - 87)  BP: 119/73 (11-05-24 @ 08:51) (112/68 - 119/73)  BP(mean): --  RR: --  SpO2: 98% (11-05-24 @ 08:51) (98% - 99%)      Lipid Panel: Date/Time: 10-26-24 @ 22:00  Cholesterol, Serum: 158  LDL Cholesterol Calculated: 85  HDL Cholesterol, Serum: 62  Total Cholesterol/HDL Ration Measurement: --  Triglycerides, Serum: 54   BMI: BMI (kg/m2): 18.5 (11-05-24 @ 08:51)  HbA1c: A1C with Estimated Average Glucose Result: 5.7 % (10-26-24 @ 22:00)    Glucose:   BP: 121/76 (11-06-24 @ 09:30) (96/60 - 121/76)Vital Signs Last 24 Hrs  T(C): 36.7 (11-06-24 @ 09:30), Max: 36.7 (11-06-24 @ 09:30)  T(F): 98 (11-06-24 @ 09:30), Max: 98 (11-06-24 @ 09:30)  HR: 96 (11-06-24 @ 09:30) (96 - 96)  BP: 121/76 (11-06-24 @ 09:30) (121/76 - 121/76)  BP(mean): --  RR: 18 (11-06-24 @ 09:30) (18 - 18)  SpO2: 98% (11-06-24 @ 09:30) (98% - 98%)      Lipid Panel: Date/Time: 10-26-24 @ 22:00  Cholesterol, Serum: 158  LDL Cholesterol Calculated: 85  HDL Cholesterol, Serum: 62  Total Cholesterol/HDL Ration Measurement: --  Triglycerides, Serum: 54

## 2024-11-05 NOTE — BH INPATIENT PSYCHIATRY PROGRESS NOTE - NSBHATTESTCOMMENTATTENDFT_PSY_A_CORE
I agree with notation. Patient noted to be dysregulated, labile, easily irritated and with poor judgment and insight. Remains intermittently compliant with med regimen. TOO to be pursued. Medicine consult team continues to follow and recs are appreciated. EKG ordered.  As above.   Suspect Neurocognitive Disorder as contributing factor to pt's recent decompensation.

## 2024-11-05 NOTE — BH INPATIENT PSYCHIATRY PROGRESS NOTE - NSBHFUPINTERVALHXFT_PSY_A_CORE
Patient is seen by DELMA and MD after being found in her room standing. Patient continues to have tangential speech. She reports having a "good" mood and sleeping "very well" stating she slept for 4 hrs hours last night. When inquired about her medication regimen, patient reports her medication is helping her bones and mentions prednisone, which she reports relieved her symptoms the most. When informed the medication is not used as a long-term medication, patient understands and references her past provider, discontinued the medication in the past due to this reasoning. Patient recalls being on lithium in the past, which has since been changed to Depakote, and states she is unhappy being on Risperidone as it is causing her to be sleepy, despite not being on the medication. Patient requests that she be discharged home and claims her provider let her know she will be leaving today. She then goes on to state, she understands it is a 3 day process. She reports wanting to leave because she has been here for several days without receiving the help she wanted (housing, transportation and a primary care provider) and goes on to complain about having to have blood drawn yesterday, stating her arms are begging for help to get them out of this "looney tune." Patient complains stating she is "always in a hospital." Pt denies SI/HI/AVH or PI. Patient is seen by DELMA and MD after being found in her room standing, she is dressed in hospital clothing, calm and cooperative. Patient continues to have at times tangential speech that is noted to be improving. She reports having a "good" mood and sleeping "very well" stating she slept for 4 hrs hours last night. When inquired about her medication regimen, patient reports her medication is helping her bones and mentions prednisone, which she reports relieved her symptoms the most and she took most recently 2 years ago. When informed the medication is not used as a long-term medication, patient understands and references her past provider, discontinued the medication in the past due to this reasoning. Patient recalls being on lithium in the past, which has since been changed to Depakote, and states she was unhappy being on Risperidone as it caused her to be sleepy. Patient requests that she be discharged home.  She reports wanting to leave because she has been here for several days without receiving the help she wanted (housing, transportation and a primary care provider) and goes on to complain about having to have blood drawn yesterday, stating her arms are begging for help to get them out of this "looney tune." Patient complains stating she is "always in a hospital." Pt denies SI/HI/AVH or PI.

## 2024-11-05 NOTE — BH INPATIENT PSYCHIATRY PROGRESS NOTE - NSBHATTESTTYPEVISIT_PSY_A_CORE
On-site Attending with Resident/Fellow/Student and EDI (99XXX codes) Attending with Resident/Fellow/Student

## 2024-11-05 NOTE — BH INPATIENT PSYCHIATRY PROGRESS NOTE - NSBHASSESSSUMMFT_PSY_ALL_CORE
Patient is a 69 year old AA female, single with adult daughter, domiciled alone previously with [facility] services, with PPH of bipolar disorder and depression per daughter and PMH of rheumatoid arthritis and lupus, who was referred by APS due to concern for poor self-care and was admitted to the medicine service for failure to thrive. Psychiatry was consulted for a psychiatric evaluation for bipolar disorder and patient was transferred to [facility] under involuntary status due to poor self-care, mood lability, and psychosis (bizarre delusion of being poisoned by [employer]). On evaluation, patient is significantly calmer and more cooperative today than yesterday; however, patient appears religiously preoccupied and hyperverbal today. Patient briefly appeared internally preoccupied and appeared to be speaking about being poisoned with medications with an unknown entity in the room as her gaze was focused on a specific area; however, she immediately minimized it. She presents very differently from yesterday though per daughter, patient may be attempting to feign normalcy to expedite discharge. Per collateral from daughter and APS worker supervisor, patient has demonstrated poor self-care that has deteriorated significantly in the last several months resulting in weight loss, disorganized behavior, an untenable living situation, and poor hygiene. Patient has been very irritable and angry for several years, and has a history of good medication effect when hospitalized but poor compliance in the community. Per daughter, patient has also reported auditory hallucinations recently, but has not done so previously. Patient's current presentation may be secondary to decompensation in bipolar disorder as patient's symptoms are slightly manic (hyperverbal, mood lability, Jainism preoccupation) and psychotic (auditory hallucinations, paranoia). However, given longstanding history of poor self-care and marked change in behavior a decade ago, schizoaffective disorder is more likely at this time. Patient requires inpatient psychiatric admission as she is unable to care for herself in the setting of psychosis, poor insight and judgment, and medication non-compliance.    - Continue Depakote 250 bid for mood stabilization  - Increase Zyprexa to total of 2.5mg daily/10mg QHS for psychosis, consider titrating as patient was previously on 15 mg   - methotrexate and hydroxychlorquine   - Supplement meals with Ensure  - PT evaluation and treatment due to marked difficulty in ambulating requiring assistance  - 1:1 observation discontinued    10/28: Pt is "angry" at her daughter for "tricking" her into admission. Concurrently, pt claims to be happy about her admittance as she is hopeful she will receive the recourses she feels are necessary for her self-care. She admits to poor eating due to her malodorous home. She admits to prior AVH in the setting of visual hallucinations of zombies, "naked people, bugs and AH of people cussing and people in her wall at home "shooting up" with last presentation 2 days prior to evaluation. She states she is sleeping better since her arrival. She denies current SI/HI/AVH/PI.   MMSE: 23 - Mild Cognitive Impairment.   Ordered repeat UA and Cx after viewing abnormal results on 10/24  10/29: Pt is "happy" to be here and get the care she needs. She continues to have circumstantial thought but appears to be aware of this. She admits to eating and sleeping well stating she is eating and sleeping the best she has in some time. She admits to having an issue with a pt but states she is happy to move on from it. She denies SI/HI/AVH or PI.   10/30: Pt is "excited" to learn of new roommate which she hopes to byrd with in an attempt to feel better about missing her granddaughter. She reports having issues with continence and requests adult diapers as she is unable to make it to the bathroom in time. She described an event of incontinence occurring last night. After conversation about medication, pt agrees to comply, despite her reluctance due to belief her psychiatric medication is "poison." She is sleeping well (4 hours), and eating well, though she is concerned she is eating too much. Pt denies SI/HI/AVH/PI.   Urine cup provided for pt to obtain repeat UA and Cx  10/31: Pt is confused, stating she needed to get vitals done when she already had them done this AM. She is irritable when discussing medications and refuses to speak about it with writer, ending the conversation early, due to frustration.   Urine lab results reviewed and WNL.   11/1: She continues to be labile alternating from kind and cheerful to frustrated rapidly. It appears her mood switches are based off the topic discussed. She has tangential speech and guided the conversation on her own. She admits to smoking since the age of 14 years old, stating she currently smokes 1 pack a week, as a vice when stressed and enjoys accompanying the cigarette with a beer. She speaks of her relationships with family and positive relationships with other patient's on floor. When informed the writer would like to ask just a few more questions and to discuss medications, the patient became irritated and demanded the writer leave, using profanity as the patient exited. Patient was seen on milieu dancing to the sound of the phone, stating it was for exercise. She was also seen by staff walking around with a towel on her head. Added Olanzapine 2.5 mg to be dispensed in the AM. Patient denied the medication, as well as all other medications except for her thiamin and multivitamin. She also refused her lab work.   11/4: She continues to be labile alternating from kind and cheerful to frustrated rapidly. She continues to have tangential thought, bringing up topics on her own that frustrate her before blaming writer and using profanity until writer leaves. She often repeats words after saying them and says random words after her sentence is finished. She continues to refuse medication at times, stating she does not want poison in her body. She requests prednisone and risperidone, and gets frustrated when learning prednisone cannot be provided. When asked about taking her Depakote regularly, patient states in the past it caused her to see double and hallucinate, stating she saw "space monsters." Pt denies current AH and VH. Patient gets frustrated with writer, and asks her to leave using profanity as she exits. Before writer could speak, Ms. Trinidad opened the door and requested that writer leave, stating she will be suing the hospital so that she can leave. When asked if she is referring to a 72 hour letter, Ms. Trinidad ignores writer and continues to use profanity requesting writer leave.   Will fill out treatment over objection paper work.   11/5: Patient continues to have tangential speech. She reports having a "good" mood and sleeping "very well" stating she slept for 4 hrs hours last night. Patient requests that she be discharged home but understands the 72 hour letter placed yesterday can take up to 3 days to be approved. Pt denies SI/HI/AVH or PI.

## 2024-11-06 PROCEDURE — 99232 SBSQ HOSP IP/OBS MODERATE 35: CPT

## 2024-11-06 RX ADMIN — Medication 250 MILLIGRAM(S): at 22:21

## 2024-11-06 RX ADMIN — HYDROXYCHLOROQUINE SULFATE 200 MILLIGRAM(S): 200 TABLET, FILM COATED ORAL at 22:21

## 2024-11-06 RX ADMIN — OLANZAPINE 10 MILLIGRAM(S): 20 TABLET ORAL at 22:21

## 2024-11-06 NOTE — BH INPATIENT PSYCHIATRY PROGRESS NOTE - NSICDXBHTERTIARYDX_PSY_ALL_CORE
R/O Schizoaffective disorder   F25.9   R/O Schizoaffective disorder   F25.9  R/O Neurocognitive deficits   R29.815

## 2024-11-06 NOTE — BH INPATIENT PSYCHIATRY PROGRESS NOTE - NSBHASSESSSUMMFT_PSY_ALL_CORE
Patient is a 69 year old AA female, single with adult daughter, domiciled alone previously with [facility] services, with PPH of bipolar disorder and depression per daughter and PMH of rheumatoid arthritis and lupus, who was referred by APS due to concern for poor self-care and was admitted to the medicine service for failure to thrive. Psychiatry was consulted for a psychiatric evaluation for bipolar disorder and patient was transferred to [facility] under involuntary status due to poor self-care, mood lability, and psychosis (bizarre delusion of being poisoned by TONEY newton). On evaluation, patient is significantly calmer and more cooperative today than yesterday; however, patient appears religiously preoccupied and hyperverbal today. Patient briefly appeared internally preoccupied and appeared to be speaking about being poisoned with medications with an unknown entity in the room as her gaze was focused on a specific area; however, she immediately minimized it. She presents very differently from yesterday though per daughter, patient may be attempting to feign normalcy to expedite discharge. Per collateral from daughter and APS worker supervisor, patient has demonstrated poor self-care that has deteriorated significantly in the last several months resulting in weight loss, disorganized behavior, an untenable living situation, and poor hygiene. Patient has been very irritable and angry for several years, and has a history of good medication effect when hospitalized but poor compliance in the community. Per daughter, patient has also reported auditory hallucinations recently, but has not done so previously. Patient's current presentation may be secondary to decompensation in bipolar disorder as patient's symptoms are slightly manic (hyperverbal, mood lability, Spiritism preoccupation) and psychotic (auditory hallucinations, paranoia). However, given longstanding history of poor self-care and marked change in behavior a decade ago, schizoaffective disorder is more likely at this time. Patient requires inpatient psychiatric admission as she is unable to care for herself in the setting of psychosis, poor insight and judgment, and medication non-compliance.    - Continue Depakote 250 bid for mood stabilization  - Increase Zyprexa to total of 2.5mg daily/10mg QHS for psychosis, consider titrating as patient was previously on 15 mg   - methotrexate and hydroxychlorquine   - Supplement meals with Ensure  - PT evaluation and treatment due to marked difficulty in ambulating requiring assistance  - 1:1 observation discontinued    10/28: Pt is "angry" at her daughter for "tricking" her into admission. Concurrently, pt claims to be happy about her admittance as she is hopeful she will receive the recourses she feels are necessary for her self-care. She admits to poor eating due to her malodorous home. She admits to prior AVH in the setting of visual hallucinations of zombies, "naked people, bugs and AH of people cussing and people in her wall at home "shooting up" with last presentation 2 days prior to evaluation. She states she is sleeping better since her arrival. She denies current SI/HI/AVH/PI.   MMSE: 23 - Mild Cognitive Impairment.   Ordered repeat UA and Cx after viewing abnormal results on 10/24  10/29: Pt is "happy" to be here and get the care she needs. She continues to have circumstantial thought but appears to be aware of this. She admits to eating and sleeping well stating she is eating and sleeping the best she has in some time. She admits to having an issue with a pt but states she is happy to move on from it. She denies SI/HI/AVH or PI.   10/30: Pt is "excited" to learn of new roommate which she hopes to byrd with in an attempt to feel better about missing her granddaughter. She reports having issues with continence and requests adult diapers as she is unable to make it to the bathroom in time. She described an event of incontinence occurring last night. After conversation about medication, pt agrees to comply, despite her reluctance due to belief her psychiatric medication is "poison." She is sleeping well (4 hours), and eating well, though she is concerned she is eating too much. Pt denies SI/HI/AVH/PI.   Urine cup provided for pt to obtain repeat UA and Cx  10/31: Pt is confused, stating she needed to get vitals done when she already had them done this AM. She is irritable when discussing medications and refuses to speak about it with writer, ending the conversation early, due to frustration.   Urine lab results reviewed and WNL.   11/1: She continues to be labile alternating from kind and cheerful to frustrated rapidly. It appears her mood switches are based off the topic discussed. She has tangential speech and guided the conversation on her own. She admits to smoking since the age of 14 years old, stating she currently smokes 1 pack a week, as a vice when stressed and enjoys accompanying the cigarette with a beer. She speaks of her relationships with family and positive relationships with other patient's on floor. When informed the writer would like to ask just a few more questions and to discuss medications, the patient became irritated and demanded the writer leave, using profanity as the patient exited. Patient was seen on milieu dancing to the sound of the phone, stating it was for exercise. She was also seen by staff walking around with a towel on her head. Added Olanzapine 2.5 mg to be dispensed in the AM. Patient denied the medication, as well as all other medications except for her thiamin and multivitamin. She also refused her lab work.   11/4: She continues to be labile alternating from kind and cheerful to frustrated rapidly. She continues to have tangential thought, bringing up topics on her own that frustrate her before blaming writer and using profanity until writer leaves. She often repeats words after saying them and says random words after her sentence is finished. She continues to refuse medication at times, stating she does not want poison in her body. She requests prednisone and risperidone, and gets frustrated when learning prednisone cannot be provided. When asked about taking her Depakote regularly, patient states in the past it caused her to see double and hallucinate, stating she saw "space monsters." Pt denies current AH and VH. Patient gets frustrated with writer, and asks her to leave using profanity as she exits. Before writer could speak, Ms. Trinidad opened the door and requested that writer leave, stating she will be suing the hospital so that she can leave. When asked if she is referring to a 72 hour letter, Ms. Trinidad ignores writer and continues to use profanity requesting writer leave.   Will fill out treatment over objection paper work.   11/5: Patient continues to have tangential speech. She reports having a "good" mood and sleeping "very well" stating she slept for 4 hrs hours last night. Patient requests that she be discharged home but understands the 72 hour letter placed yesterday can take up to 3 days to be approved. Pt denies SI/HI/AVH or PI.  11/6: Pt continues to have circumstantial speech. She is agitated that she has not been discharged and expresses frustration with the daily routine while here. She refuses to speak with staff further. Not compliant with medications and remains selective of the one's she takes.   Continuing with TOO  In regards to 72 hour letter, retention was filed.  Patient is a 69 year old AA female, single with adult daughter, domiciled alone previously with [facility] services, with PPH of bipolar disorder and depression per daughter and PMH of rheumatoid arthritis and lupus, who was referred by APS due to concern for poor self-care and was admitted to the medicine service for failure to thrive. Psychiatry was consulted for a psychiatric evaluation for bipolar disorder and patient was transferred to [facility] under involuntary status due to poor self-care, mood lability, and psychosis (bizarre delusion of being poisoned by TONE[place] new[place]). On evaluation, patient is significantly calmer and more cooperative today than yesterday; however, patient appears religiously preoccupied and hyperverbal today. Patient briefly appeared internally preoccupied and appeared to be speaking about being poisoned with medications with an unknown entity in the room as her gaze was focused on a specific area; however, she immediately minimized it. She presents very differently from yesterday though per daughter, patient may be attempting to feign normalcy to expedite discharge. Per collateral from daughter and APS worker supervisor, patient has demonstrated poor self-care that has deteriorated significantly in the last several months resulting in weight loss, disorganized behavior, an untenable living situation, and poor hygiene. Patient has been very irritable and angry for several years, and has a history of good medication effect when hospitalized but poor compliance in the community. Per daughter, patient has also reported auditory hallucinations recently, but has not done so previously. Patient's current presentation may be secondary to decompensation in bipolar disorder as patient's symptoms are slightly manic (hyperverbal, mood lability, Yazdanism preoccupation) and psychotic (auditory hallucinations, paranoia). However, given longstanding history of poor self-care and marked change in behavior a decade ago, schizoaffective disorder is more likely at this time. Patient requires inpatient psychiatric admission as she is unable to care for herself in the setting of psychosis, poor insight and judgment, and medication non-compliance.    - Continue Depakote 250 bid for mood stabilization  - Increase Zyprexa to total of 2.5mg daily/10mg QHS for psychosis, consider titrating as patient was previously on 15 mg   - methotrexate and hydroxychlorquine   - Supplement meals with Ensure  - PT evaluation and treatment due to marked difficulty in ambulating requiring assistance  - 1:1 observation discontinued    10/28: Pt is "angry" at her daughter for "tricking" her into admission. Concurrently, pt claims to be happy about her admittance as she is hopeful she will receive the recourses she feels are necessary for her self-care. She admits to poor eating due to her malodorous home. She admits to prior AVH in the setting of visual hallucinations of zombies, "naked people, bugs and AH of people cussing and people in her wall at home "shooting up" with last presentation 2 days prior to evaluation. She states she is sleeping better since her arrival. She denies current SI/HI/AVH/PI.   MMSE: 23 - Mild Cognitive Impairment.   Ordered repeat UA and Cx after viewing abnormal results on 10/24  10/29: Pt is "happy" to be here and get the care she needs. She continues to have circumstantial thought but appears to be aware of this. She admits to eating and sleeping well stating she is eating and sleeping the best she has in some time. She admits to having an issue with a pt but states she is happy to move on from it. She denies SI/HI/AVH or PI.   10/30: Pt is "excited" to learn of new roommate which she hopes to byrd with in an attempt to feel better about missing her granddaughter. She reports having issues with continence and requests adult diapers as she is unable to make it to the bathroom in time. She described an event of incontinence occurring last night. After conversation about medication, pt agrees to comply, despite her reluctance due to belief her psychiatric medication is "poison." She is sleeping well (4 hours), and eating well, though she is concerned she is eating too much. Pt denies SI/HI/AVH/PI.   Urine cup provided for pt to obtain repeat UA and Cx  10/31: Pt is confused, stating she needed to get vitals done when she already had them done this AM. She is irritable when discussing medications and refuses to speak about it with writer, ending the conversation early, due to frustration.   Urine lab results reviewed and WNL.   11/1: She continues to be labile alternating from kind and cheerful to frustrated rapidly. It appears her mood switches are based off the topic discussed. She has tangential speech and guided the conversation on her own. She admits to smoking since the age of 14 years old, stating she currently smokes 1 pack a week, as a vice when stressed and enjoys accompanying the cigarette with a beer. She speaks of her relationships with family and positive relationships with other patient's on floor. When informed the writer would like to ask just a few more questions and to discuss medications, the patient became irritated and demanded the writer leave, using profanity as the patient exited. Patient was seen on milieu dancing to the sound of the phone, stating it was for exercise. She was also seen by staff walking around with a towel on her head. Added Olanzapine 2.5 mg to be dispensed in the AM. Patient denied the medication, as well as all other medications except for her thiamin and multivitamin. She also refused her lab work.   11/4: She continues to be labile alternating from kind and cheerful to frustrated rapidly. She continues to have tangential thought, bringing up topics on her own that frustrate her before blaming writer and using profanity until writer leaves. She often repeats words after saying them and says random words after her sentence is finished. She continues to refuse medication at times, stating she does not want poison in her body. She requests prednisone and risperidone, and gets frustrated when learning prednisone cannot be provided. When asked about taking her Depakote regularly, patient states in the past it caused her to see double and hallucinate, stating she saw "space monsters." Pt denies current AH and VH. Patient gets frustrated with writer, and asks her to leave using profanity as she exits. Before writer could speak, Ms. Trinidad opened the door and requested that writer leave, stating she will be suing the hospital so that she can leave. When asked if she is referring to a 72 hour letter, Ms. Trinidad ignores writer and continues to use profanity requesting writer leave.   Will fill out treatment over objection paper work.   11/5: Patient continues to have tangential speech. She reports having a "good" mood and sleeping "very well" stating she slept for 4 hrs hours last night. Patient requests that she be discharged home but understands the 72 hour letter placed yesterday can take up to 3 days to be approved. Pt denies SI/HI/AVH or PI.  11/6: Pt continues to have circumstantial speech. She is agitated that she has not been discharged and expresses frustration with the daily routine while here. She refuses to speak with staff further. Not compliant with medications and remains selective of the one's she takes.   Continuing with TOO. In regards to 72 hour letter, retention was filed.

## 2024-11-06 NOTE — BH INPATIENT PSYCHIATRY PROGRESS NOTE - NSBHCHARTREVIEWVS_PSY_A_CORE FT
Vital Signs Last 24 Hrs  T(C): 36.7 (11-06-24 @ 09:30), Max: 36.7 (11-06-24 @ 09:30)  T(F): 98 (11-06-24 @ 09:30), Max: 98 (11-06-24 @ 09:30)  HR: 96 (11-06-24 @ 09:30) (96 - 96)  BP: 121/76 (11-06-24 @ 09:30) (121/76 - 121/76)  BP(mean): --  RR: 18 (11-06-24 @ 09:30) (18 - 18)  SpO2: 98% (11-06-24 @ 09:30) (98% - 98%)

## 2024-11-06 NOTE — BH INPATIENT PSYCHIATRY PROGRESS NOTE - NSBHFUPINTERVALHXFT_PSY_A_CORE
Patient is seen by PA-S and NP in milieu after being seen dancing and giving the middle finger to RN trying to dispense medication. On approach, Ms. Trinidad appears agitated, moving away from staff stating "you are too damn close, stay there." When asked about her mood, Ms. Trinidad states she is feeling "fucked up" and states writer and NP are the reason she's feeling that way. She goes on to make comments stating "its the same shit everyday" and making references that she wants to get out of here. As she goes on complaining about her stay here, Ms. Trinidad is not making eye contact and is looking intensely at the window to the nurses room. As NP tried to redirect the conversation, Ms. Trinidad stood up from her seat and asked that her daughter be called, asking staff to move away from the phone so that she can call her herself. Unable to continue conversation with Ms. Trinidad. She remains non-compliant with medication and is staying select with the medications she is taking.   Ms. Trinidad's 72 hour letter was seen and the nurse manager filed for retention.   Moving forward with TOO

## 2024-11-06 NOTE — BH INPATIENT PSYCHIATRY PROGRESS NOTE - NSBHMETABOLIC_PSY_ALL_CORE_FT
BMI: BMI (kg/m2): 18.5 (11-05-24 @ 08:51)  HbA1c: A1C with Estimated Average Glucose Result: 5.7 % (10-26-24 @ 22:00)    Glucose:   BP: 121/76 (11-06-24 @ 09:30) (96/60 - 121/76)Vital Signs Last 24 Hrs  T(C): 36.7 (11-06-24 @ 09:30), Max: 36.7 (11-06-24 @ 09:30)  T(F): 98 (11-06-24 @ 09:30), Max: 98 (11-06-24 @ 09:30)  HR: 96 (11-06-24 @ 09:30) (96 - 96)  BP: 121/76 (11-06-24 @ 09:30) (121/76 - 121/76)  BP(mean): --  RR: 18 (11-06-24 @ 09:30) (18 - 18)  SpO2: 98% (11-06-24 @ 09:30) (98% - 98%)      Lipid Panel: Date/Time: 10-26-24 @ 22:00  Cholesterol, Serum: 158  LDL Cholesterol Calculated: 85  HDL Cholesterol, Serum: 62  Total Cholesterol/HDL Ration Measurement: --  Triglycerides, Serum: 54

## 2024-11-07 PROCEDURE — 99232 SBSQ HOSP IP/OBS MODERATE 35: CPT

## 2024-11-07 RX ADMIN — OLANZAPINE 10 MILLIGRAM(S): 20 TABLET ORAL at 22:02

## 2024-11-07 RX ADMIN — Medication 250 MILLIGRAM(S): at 22:02

## 2024-11-07 RX ADMIN — HYDROXYCHLOROQUINE SULFATE 200 MILLIGRAM(S): 200 TABLET, FILM COATED ORAL at 22:02

## 2024-11-07 NOTE — BH INPATIENT PSYCHIATRY PROGRESS NOTE - NSBHASSESSSUMMFT_PSY_ALL_CORE
Patient is a 69 year old AA female, single with adult daughter, domiciled alone previously with [facility] services, with PPH of bipolar disorder and depression per daughter and PMH of rheumatoid arthritis and lupus, who was referred by APS due to concern for poor self-care and was admitted to the medicine service for failure to thrive. Psychiatry was consulted for a psychiatric evaluation for bipolar disorder and patient was transferred to [facility] under involuntary status due to poor self-care, mood lability, and psychosis (bizarre delusion of being poisoned by TONEY newton). On evaluation, patient is significantly calmer and more cooperative today than yesterday; however, patient appears religiously preoccupied and hyperverbal today. Patient briefly appeared internally preoccupied and appeared to be speaking about being poisoned with medications with an unknown entity in the room as her gaze was focused on a specific area; however, she immediately minimized it. She presents very differently from yesterday though per daughter, patient may be attempting to feign normalcy to expedite discharge. Per collateral from daughter and APS worker supervisor, patient has demonstrated poor self-care that has deteriorated significantly in the last several months resulting in weight loss, disorganized behavior, an untenable living situation, and poor hygiene. Patient has been very irritable and angry for several years, and has a history of good medication effect when hospitalized but poor compliance in the community. Per daughter, patient has also reported auditory hallucinations recently, but has not done so previously. Patient's current presentation may be secondary to decompensation in bipolar disorder as patient's symptoms are slightly manic (hyperverbal, mood lability, Cheondoism preoccupation) and psychotic (auditory hallucinations, paranoia). However, given longstanding history of poor self-care and marked change in behavior a decade ago, schizoaffective disorder is more likely at this time. Patient requires inpatient psychiatric admission as she is unable to care for herself in the setting of psychosis, poor insight and judgment, and medication non-compliance.    - Continue Depakote 250 bid for mood stabilization  - Increase Zyprexa to total of 2.5mg daily/10mg QHS for psychosis, consider titrating as patient was previously on 15 mg   - methotrexate and hydroxychlorquine   - Supplement meals with Ensure  - PT evaluation and treatment due to marked difficulty in ambulating requiring assistance  - 1:1 observation discontinued    10/28: Pt is "angry" at her daughter for "tricking" her into admission. Concurrently, pt claims to be happy about her admittance as she is hopeful she will receive the recourses she feels are necessary for her self-care. She admits to poor eating due to her malodorous home. She admits to prior AVH in the setting of visual hallucinations of zombies, "naked people, bugs and AH of people cussing and people in her wall at home "shooting up" with last presentation 2 days prior to evaluation. She states she is sleeping better since her arrival. She denies current SI/HI/AVH/PI.   MMSE: 23 - Mild Cognitive Impairment.   Ordered repeat UA and Cx after viewing abnormal results on 10/24  10/29: Pt is "happy" to be here and get the care she needs. She continues to have circumstantial thought but appears to be aware of this. She admits to eating and sleeping well stating she is eating and sleeping the best she has in some time. She admits to having an issue with a pt but states she is happy to move on from it. She denies SI/HI/AVH or PI.   10/30: Pt is "excited" to learn of new roommate which she hopes to byrd with in an attempt to feel better about missing her granddaughter. She reports having issues with continence and requests adult diapers as she is unable to make it to the bathroom in time. She described an event of incontinence occurring last night. After conversation about medication, pt agrees to comply, despite her reluctance due to belief her psychiatric medication is "poison." She is sleeping well (4 hours), and eating well, though she is concerned she is eating too much. Pt denies SI/HI/AVH/PI.   Urine cup provided for pt to obtain repeat UA and Cx  10/31: Pt is confused, stating she needed to get vitals done when she already had them done this AM. She is irritable when discussing medications and refuses to speak about it with writer, ending the conversation early, due to frustration.   Urine lab results reviewed and WNL.   11/1: She continues to be labile alternating from kind and cheerful to frustrated rapidly. It appears her mood switches are based off the topic discussed. She has tangential speech and guided the conversation on her own. She admits to smoking since the age of 14 years old, stating she currently smokes 1 pack a week, as a vice when stressed and enjoys accompanying the cigarette with a beer. She speaks of her relationships with family and positive relationships with other patient's on floor. When informed the writer would like to ask just a few more questions and to discuss medications, the patient became irritated and demanded the writer leave, using profanity as the patient exited. Patient was seen on milieu dancing to the sound of the phone, stating it was for exercise. She was also seen by staff walking around with a towel on her head. Added Olanzapine 2.5 mg to be dispensed in the AM. Patient denied the medication, as well as all other medications except for her thiamin and multivitamin. She also refused her lab work.   11/4: She continues to be labile alternating from kind and cheerful to frustrated rapidly. She continues to have tangential thought, bringing up topics on her own that frustrate her before blaming writer and using profanity until writer leaves. She often repeats words after saying them and says random words after her sentence is finished. She continues to refuse medication at times, stating she does not want poison in her body. She requests prednisone and risperidone, and gets frustrated when learning prednisone cannot be provided. When asked about taking her Depakote regularly, patient states in the past it caused her to see double and hallucinate, stating she saw "space monsters." Pt denies current AH and VH. Patient gets frustrated with writer, and asks her to leave using profanity as she exits. Before writer could speak, Ms. Trinidad opened the door and requested that writer leave, stating she will be suing the hospital so that she can leave. When asked if she is referring to a 72 hour letter, Ms. Trinidad ignores writer and continues to use profanity requesting writer leave.   Will fill out treatment over objection paper work.   11/5: Patient continues to have tangential speech. She reports having a "good" mood and sleeping "very well" stating she slept for 4 hrs hours last night. Patient requests that she be discharged home but understands the 72 hour letter placed yesterday can take up to 3 days to be approved. Pt denies SI/HI/AVH or PI.  11/6: Pt continues to have circumstantial speech. She is agitated that she has not been discharged and expresses frustration with the daily routine while here. She refuses to speak with staff further. Not compliant with medications and remains selective of the one's she takes.   Continuing with TOO. In regards to 72 hour letter, retention was filed.   ;;11/07: rather pleasant with the writer; smiling ; Sleep  appetite ok; no pain issues. Not endorsing  suicidal or homicidal ideation intent or plans; no mention of auditory or visual hallucinations anxious;  somewhat internally preoccupied.  "Will you come back and talk to me?"  Yet irritable with other staff on the DASA scale. Current medications aluminum hydroxide/magnesium hydroxide/simethicone Suspension 30 milliLiter(s) Oral every 6 hours PRN  cholecalciferol 600 Unit(s) Oral daily  divalproex  milliGRAM(s) Oral two times a day for mood   folic acid 1 milliGRAM(s) Oral daily  hydroxychloroquine 200 milliGRAM(s) Oral two times a day  ibuprofen  Tablet. 200 milliGRAM(s) Oral three times a day PRN  methotrexate 7.5 milliGRAM(s) Oral <User Schedule>  multivitamin 1 Tablet(s) Oral daily  OLANZapine 5 milliGRAM(s) Oral every 8 hours PRN  OLANZapine 10 milliGRAM(s) Oral at bedtime for mood  thiamine 100 milliGRAM(s) Oral daily

## 2024-11-07 NOTE — BH INPATIENT PSYCHIATRY PROGRESS NOTE - NSBHCHARTREVIEWVS_PSY_A_CORE FT
Vital Signs Last 24 Hrs  T(C): 36.4 (11-07-24 @ 16:44), Max: 36.4 (11-07-24 @ 09:05)  T(F): 97.6 (11-07-24 @ 16:44), Max: 97.6 (11-07-24 @ 09:05)  HR: 98 (11-07-24 @ 16:44) (93 - 98)  BP: 113/75 (11-07-24 @ 16:44) (113/75 - 118/68)  BP(mean): --  RR: 18 (11-07-24 @ 09:05) (18 - 18)  SpO2: 96% (11-07-24 @ 16:44) (96% - 97%)

## 2024-11-07 NOTE — BH INPATIENT PSYCHIATRY PROGRESS NOTE - NSBHMETABOLIC_PSY_ALL_CORE_FT
BMI: BMI (kg/m2): 18.5 (11-05-24 @ 08:51)  HbA1c: A1C with Estimated Average Glucose Result: 5.7 % (10-26-24 @ 22:00)    Glucose:   BP: 113/75 (11-07-24 @ 16:44) (113/75 - 121/76)Vital Signs Last 24 Hrs  T(C): 36.4 (11-07-24 @ 16:44), Max: 36.4 (11-07-24 @ 09:05)  T(F): 97.6 (11-07-24 @ 16:44), Max: 97.6 (11-07-24 @ 09:05)  HR: 98 (11-07-24 @ 16:44) (93 - 98)  BP: 113/75 (11-07-24 @ 16:44) (113/75 - 118/68)  BP(mean): --  RR: 18 (11-07-24 @ 09:05) (18 - 18)  SpO2: 96% (11-07-24 @ 16:44) (96% - 97%)      Lipid Panel: Date/Time: 10-26-24 @ 22:00  Cholesterol, Serum: 158  LDL Cholesterol Calculated: 85  HDL Cholesterol, Serum: 62  Total Cholesterol/HDL Ration Measurement: --  Triglycerides, Serum: 54

## 2024-11-07 NOTE — BH INPATIENT PSYCHIATRY PROGRESS NOTE - NSBHFUPINTERVALHXFT_PSY_A_CORE
rather pleasant with the writer; smiling ; Sleep  appetite ok; no pain issues. Not endorsing  suicidal or homicidal ideation intent or plans; no mention of auditory or visual hallucinations anxious;  somewhat internally preoccupied.  "Will you come back and talk to me?"  Yet irritable with other staff on the DASA scale.  rather pleasant with the writer; smiling ; Sleep  appetite ok; no pain issues. Not endorsing  suicidal or homicidal ideation intent or plans; no mention of auditory or visual hallucinations anxious;  somewhat internally preoccupied.  "Will you come back and talk to me?"  Yet irritable with other staff on the DASA scale.   Appears not compliant with medicatio  ;As patient is consistently not compliant with medication needed to treat the patient's condition and the patient's symptoms remain severe and interfere with functioning, application is being made for treatment over objection

## 2024-11-08 PROCEDURE — 99232 SBSQ HOSP IP/OBS MODERATE 35: CPT

## 2024-11-08 RX ADMIN — Medication 1 MILLIGRAM(S): at 11:25

## 2024-11-08 RX ADMIN — Medication 200 MILLIGRAM(S): at 23:14

## 2024-11-08 RX ADMIN — HYDROXYCHLOROQUINE SULFATE 200 MILLIGRAM(S): 200 TABLET, FILM COATED ORAL at 11:29

## 2024-11-08 RX ADMIN — OLANZAPINE 10 MILLIGRAM(S): 20 TABLET ORAL at 21:57

## 2024-11-08 RX ADMIN — HYDROXYCHLOROQUINE SULFATE 200 MILLIGRAM(S): 200 TABLET, FILM COATED ORAL at 22:41

## 2024-11-08 RX ADMIN — Medication 1 TABLET(S): at 11:21

## 2024-11-08 RX ADMIN — Medication 200 MILLIGRAM(S): at 21:56

## 2024-11-08 NOTE — BH INPATIENT PSYCHIATRY PROGRESS NOTE - NSBHMETABOLIC_PSY_ALL_CORE_FT
BMI: BMI (kg/m2): 18.5 (11-05-24 @ 08:51)  HbA1c: A1C with Estimated Average Glucose Result: 5.7 % (10-26-24 @ 22:00)    Glucose:   BP: 115/71 (11-08-24 @ 09:55) (113/75 - 121/76)Vital Signs Last 24 Hrs  T(C): 36.4 (11-08-24 @ 09:55), Max: 36.4 (11-07-24 @ 16:44)  T(F): 97.6 (11-08-24 @ 09:55), Max: 97.6 (11-07-24 @ 16:44)  HR: 100 (11-08-24 @ 09:55) (98 - 100)  BP: 115/71 (11-08-24 @ 09:55) (113/75 - 115/71)  BP(mean): --  RR: 18 (11-08-24 @ 09:55) (18 - 18)  SpO2: 98% (11-08-24 @ 09:55) (96% - 98%)      Lipid Panel: Date/Time: 10-26-24 @ 22:00  Cholesterol, Serum: 158  LDL Cholesterol Calculated: 85  HDL Cholesterol, Serum: 62  Total Cholesterol/HDL Ration Measurement: --  Triglycerides, Serum: 54

## 2024-11-08 NOTE — BH INPATIENT PSYCHIATRY PROGRESS NOTE - NSBHCHARTREVIEWVS_PSY_A_CORE FT
Vital Signs Last 24 Hrs  T(C): 36.4 (11-08-24 @ 09:55), Max: 36.4 (11-07-24 @ 16:44)  T(F): 97.6 (11-08-24 @ 09:55), Max: 97.6 (11-07-24 @ 16:44)  HR: 100 (11-08-24 @ 09:55) (98 - 100)  BP: 115/71 (11-08-24 @ 09:55) (113/75 - 115/71)  BP(mean): --  RR: 18 (11-08-24 @ 09:55) (18 - 18)  SpO2: 98% (11-08-24 @ 09:55) (96% - 98%)

## 2024-11-08 NOTE — BH INPATIENT PSYCHIATRY PROGRESS NOTE - NSBHASSESSSUMMFT_PSY_ALL_CORE
Patient is a 69 year old AA female, single with adult daughter, domiciled alone previously with [facility] services, with PPH of bipolar disorder and depression per daughter and PMH of rheumatoid arthritis and lupus, who was referred by APS due to concern for poor self-care and was admitted to the medicine service for failure to thrive. Psychiatry was consulted for a psychiatric evaluation for bipolar disorder and patient was transferred to [facility] under involuntary status due to poor self-care, mood lability, and psychosis (bizarre delusion of being poisoned by TONEY newton). On evaluation, patient is significantly calmer and more cooperative today than yesterday; however, patient appears religiously preoccupied and hyperverbal today. Patient briefly appeared internally preoccupied and appeared to be speaking about being poisoned with medications with an unknown entity in the room as her gaze was focused on a specific area; however, she immediately minimized it. She presents very differently from yesterday though per daughter, patient may be attempting to feign normalcy to expedite discharge. Per collateral from daughter and APS worker supervisor, patient has demonstrated poor self-care that has deteriorated significantly in the last several months resulting in weight loss, disorganized behavior, an untenable living situation, and poor hygiene. Patient has been very irritable and angry for several years, and has a history of good medication effect when hospitalized but poor compliance in the community. Per daughter, patient has also reported auditory hallucinations recently, but has not done so previously. Patient's current presentation may be secondary to decompensation in bipolar disorder as patient's symptoms are slightly manic (hyperverbal, mood lability, Rastafarian preoccupation) and psychotic (auditory hallucinations, paranoia). However, given longstanding history of poor self-care and marked change in behavior a decade ago, schizoaffective disorder is more likely at this time. Patient requires inpatient psychiatric admission as she is unable to care for herself in the setting of psychosis, poor insight and judgment, and medication non-compliance.    - Continue Depakote 250 bid for mood stabilization  - Increase Zyprexa to total of 2.5mg daily/10mg QHS for psychosis, consider titrating as patient was previously on 15 mg   - methotrexate and hydroxychlorquine   - Supplement meals with Ensure  - PT evaluation and treatment due to marked difficulty in ambulating requiring assistance  - 1:1 observation discontinued    10/28: Pt is "angry" at her daughter for "tricking" her into admission. Concurrently, pt claims to be happy about her admittance as she is hopeful she will receive the recourses she feels are necessary for her self-care. She admits to poor eating due to her malodorous home. She admits to prior AVH in the setting of visual hallucinations of zombies, "naked people, bugs and AH of people cussing and people in her wall at home "shooting up" with last presentation 2 days prior to evaluation. She states she is sleeping better since her arrival. She denies current SI/HI/AVH/PI.   MMSE: 23 - Mild Cognitive Impairment.   Ordered repeat UA and Cx after viewing abnormal results on 10/24  10/29: Pt is "happy" to be here and get the care she needs. She continues to have circumstantial thought but appears to be aware of this. She admits to eating and sleeping well stating she is eating and sleeping the best she has in some time. She admits to having an issue with a pt but states she is happy to move on from it. She denies SI/HI/AVH or PI.   10/30: Pt is "excited" to learn of new roommate which she hopes to byrd with in an attempt to feel better about missing her granddaughter. She reports having issues with continence and requests adult diapers as she is unable to make it to the bathroom in time. She described an event of incontinence occurring last night. After conversation about medication, pt agrees to comply, despite her reluctance due to belief her psychiatric medication is "poison." She is sleeping well (4 hours), and eating well, though she is concerned she is eating too much. Pt denies SI/HI/AVH/PI.   Urine cup provided for pt to obtain repeat UA and Cx  10/31: Pt is confused, stating she needed to get vitals done when she already had them done this AM. She is irritable when discussing medications and refuses to speak about it with writer, ending the conversation early, due to frustration.   Urine lab results reviewed and WNL.   11/1: She continues to be labile alternating from kind and cheerful to frustrated rapidly. It appears her mood switches are based off the topic discussed. She has tangential speech and guided the conversation on her own. She admits to smoking since the age of 14 years old, stating she currently smokes 1 pack a week, as a vice when stressed and enjoys accompanying the cigarette with a beer. She speaks of her relationships with family and positive relationships with other patient's on floor. When informed the writer would like to ask just a few more questions and to discuss medications, the patient became irritated and demanded the writer leave, using profanity as the patient exited. Patient was seen on milieu dancing to the sound of the phone, stating it was for exercise. She was also seen by staff walking around with a towel on her head. Added Olanzapine 2.5 mg to be dispensed in the AM. Patient denied the medication, as well as all other medications except for her thiamin and multivitamin. She also refused her lab work.   11/4: She continues to be labile alternating from kind and cheerful to frustrated rapidly. She continues to have tangential thought, bringing up topics on her own that frustrate her before blaming writer and using profanity until writer leaves. She often repeats words after saying them and says random words after her sentence is finished. She continues to refuse medication at times, stating she does not want poison in her body. She requests prednisone and risperidone, and gets frustrated when learning prednisone cannot be provided. When asked about taking her Depakote regularly, patient states in the past it caused her to see double and hallucinate, stating she saw "space monsters." Pt denies current AH and VH. Patient gets frustrated with writer, and asks her to leave using profanity as she exits. Before writer could speak, Ms. Trinidad opened the door and requested that writer leave, stating she will be suing the hospital so that she can leave. When asked if she is referring to a 72 hour letter, Ms. Trinidad ignores writer and continues to use profanity requesting writer leave.   Will fill out treatment over objection paper work.   11/5: Patient continues to have tangential speech. She reports having a "good" mood and sleeping "very well" stating she slept for 4 hrs hours last night. Patient requests that she be discharged home but understands the 72 hour letter placed yesterday can take up to 3 days to be approved. Pt denies SI/HI/AVH or PI.  11/6: Pt continues to have circumstantial speech. She is agitated that she has not been discharged and expresses frustration with the daily routine while here. She refuses to speak with staff further. Not compliant with medications and remains selective of the one's she takes.   Continuing with TOO. In regards to 72 hour letter, retention was filed.   ;;11/07: rather pleasant with the writer; smiling ; Sleep  appetite ok; no pain issues. Not endorsing  suicidal or homicidal ideation intent or plans; no mention of auditory or visual hallucinations anxious;  somewhat internally preoccupied.  "Will you come back and talk to me?"  Yet irritable with other staff on the DASA scale. Current medications aluminum hydroxide/magnesium hydroxide/simethicone Suspension 30 milliLiter(s) Oral every 6 hours PRN  cholecalciferol 600 Unit(s) Oral daily  divalproex  milliGRAM(s) Oral two times a day for mood   folic acid 1 milliGRAM(s) Oral daily  hydroxychloroquine 200 milliGRAM(s) Oral two times a day  ibuprofen  Tablet. 200 milliGRAM(s) Oral three times a day PRN  methotrexate 7.5 milliGRAM(s) Oral <User Schedule>  multivitamin 1 Tablet(s) Oral daily  OLANZapine 5 milliGRAM(s) Oral every 8 hours PRN  OLANZapine 10 milliGRAM(s) Oral at bedtime for mood  thiamine 100 milliGRAM(s) Oral daily

## 2024-11-08 NOTE — BH INPATIENT PSYCHIATRY PROGRESS NOTE - NSBHFUPINTERVALHXFT_PSY_A_CORE
in the vestibule of the special room section; smiling at the writer; Not endorsing  suicidal or homicidal ideation intent or plans; no mention of auditory or visual hallucinations somewhat interested in when she is leaving.  not irritable with writer;  appears to be mood stable;  however staff interactions can be problematic.

## 2024-11-09 RX ADMIN — Medication 1 TABLET(S): at 10:15

## 2024-11-09 RX ADMIN — Medication 1 MILLIGRAM(S): at 10:15

## 2024-11-09 RX ADMIN — OLANZAPINE 10 MILLIGRAM(S): 20 TABLET ORAL at 22:06

## 2024-11-09 RX ADMIN — Medication 200 MILLIGRAM(S): at 22:12

## 2024-11-09 RX ADMIN — HYDROXYCHLOROQUINE SULFATE 200 MILLIGRAM(S): 200 TABLET, FILM COATED ORAL at 10:16

## 2024-11-09 RX ADMIN — Medication 200 MILLIGRAM(S): at 22:44

## 2024-11-09 RX ADMIN — HYDROXYCHLOROQUINE SULFATE 200 MILLIGRAM(S): 200 TABLET, FILM COATED ORAL at 22:06

## 2024-11-09 RX ADMIN — Medication 250 MILLIGRAM(S): at 22:06

## 2024-11-10 RX ADMIN — Medication 250 MILLIGRAM(S): at 22:10

## 2024-11-10 RX ADMIN — Medication 200 MILLIGRAM(S): at 11:30

## 2024-11-10 RX ADMIN — Medication 1 TABLET(S): at 10:42

## 2024-11-10 RX ADMIN — HYDROXYCHLOROQUINE SULFATE 200 MILLIGRAM(S): 200 TABLET, FILM COATED ORAL at 22:10

## 2024-11-10 RX ADMIN — Medication 200 MILLIGRAM(S): at 22:11

## 2024-11-10 RX ADMIN — HYDROXYCHLOROQUINE SULFATE 200 MILLIGRAM(S): 200 TABLET, FILM COATED ORAL at 10:38

## 2024-11-10 RX ADMIN — Medication 200 MILLIGRAM(S): at 10:44

## 2024-11-10 RX ADMIN — OLANZAPINE 10 MILLIGRAM(S): 20 TABLET ORAL at 22:12

## 2024-11-11 PROCEDURE — 99232 SBSQ HOSP IP/OBS MODERATE 35: CPT

## 2024-11-11 RX ORDER — ACETAMINOPHEN 500MG 500 MG/1
650 TABLET, COATED ORAL EVERY 6 HOURS
Refills: 0 | Status: DISCONTINUED | OUTPATIENT
Start: 2024-11-11 | End: 2024-11-29

## 2024-11-11 RX ORDER — HYDROXYCHLOROQUINE SULFATE 200 MG/1
200 TABLET, FILM COATED ORAL
Refills: 0 | Status: DISCONTINUED | OUTPATIENT
Start: 2024-11-11 | End: 2024-11-29

## 2024-11-11 RX ADMIN — HYDROXYCHLOROQUINE SULFATE 200 MILLIGRAM(S): 200 TABLET, FILM COATED ORAL at 22:36

## 2024-11-11 RX ADMIN — Medication 200 MILLIGRAM(S): at 23:22

## 2024-11-11 RX ADMIN — Medication 200 MILLIGRAM(S): at 22:36

## 2024-11-11 RX ADMIN — Medication 250 MILLIGRAM(S): at 10:36

## 2024-11-11 RX ADMIN — HYDROXYCHLOROQUINE SULFATE 200 MILLIGRAM(S): 200 TABLET, FILM COATED ORAL at 10:37

## 2024-11-11 RX ADMIN — Medication 200 MILLIGRAM(S): at 06:16

## 2024-11-11 RX ADMIN — Medication 1 TABLET(S): at 10:34

## 2024-11-11 NOTE — BH INPATIENT PSYCHIATRY PROGRESS NOTE - NSBHFUPINTERVALHXFT_PSY_A_CORE
in the vestibule of the special care area with her walker calm and polite;  informed more directible by staff;  Not endorsing  suicidal or homicidal ideation intent or plans; no mention of auditory or visual hallucinations wants her cell phone so she can get phone numbers.  slightly anxious.  Sleep  appetite ok; no pain issues.

## 2024-11-11 NOTE — BH INPATIENT PSYCHIATRY PROGRESS NOTE - NSBHASSESSSUMMFT_PSY_ALL_CORE
Patient is a 69 year old AA female, single with adult daughter, domiciled alone previously with [facility] services, with PPH of bipolar disorder and depression per daughter and PMH of rheumatoid arthritis and lupus, who was referred by APS due to concern for poor self-care and was admitted to the medicine service for failure to thrive. Psychiatry was consulted for a psychiatric evaluation for bipolar disorder and patient was transferred to [facility] under involuntary status due to poor self-care, mood lability, and psychosis (bizarre delusion of being poisoned by TONEY newton). On evaluation, patient is significantly calmer and more cooperative today than yesterday; however, patient appears religiously preoccupied and hyperverbal today. Patient briefly appeared internally preoccupied and appeared to be speaking about being poisoned with medications with an unknown entity in the room as her gaze was focused on a specific area; however, she immediately minimized it. She presents very differently from yesterday though per daughter, patient may be attempting to feign normalcy to expedite discharge. Per collateral from daughter and APS worker supervisor, patient has demonstrated poor self-care that has deteriorated significantly in the last several months resulting in weight loss, disorganized behavior, an untenable living situation, and poor hygiene. Patient has been very irritable and angry for several years, and has a history of good medication effect when hospitalized but poor compliance in the community. Per daughter, patient has also reported auditory hallucinations recently, but has not done so previously. Patient's current presentation may be secondary to decompensation in bipolar disorder as patient's symptoms are slightly manic (hyperverbal, mood lability, Restorationist preoccupation) and psychotic (auditory hallucinations, paranoia). However, given longstanding history of poor self-care and marked change in behavior a decade ago, schizoaffective disorder is more likely at this time. Patient requires inpatient psychiatric admission as she is unable to care for herself in the setting of psychosis, poor insight and judgment, and medication non-compliance.    - Continue Depakote 250 bid for mood stabilization  - Increase Zyprexa to total of 2.5mg daily/10mg QHS for psychosis, consider titrating as patient was previously on 15 mg   - methotrexate and hydroxychlorquine   - Supplement meals with Ensure  - PT evaluation and treatment due to marked difficulty in ambulating requiring assistance  - 1:1 observation discontinued    10/28: Pt is "angry" at her daughter for "tricking" her into admission. Concurrently, pt claims to be happy about her admittance as she is hopeful she will receive the recourses she feels are necessary for her self-care. She admits to poor eating due to her malodorous home. She admits to prior AVH in the setting of visual hallucinations of zombies, "naked people, bugs and AH of people cussing and people in her wall at home "shooting up" with last presentation 2 days prior to evaluation. She states she is sleeping better since her arrival. She denies current SI/HI/AVH/PI.   MMSE: 23 - Mild Cognitive Impairment.   Ordered repeat UA and Cx after viewing abnormal results on 10/24  10/29: Pt is "happy" to be here and get the care she needs. She continues to have circumstantial thought but appears to be aware of this. She admits to eating and sleeping well stating she is eating and sleeping the best she has in some time. She admits to having an issue with a pt but states she is happy to move on from it. She denies SI/HI/AVH or PI.   10/30: Pt is "excited" to learn of new roommate which she hopes to byrd with in an attempt to feel better about missing her granddaughter. She reports having issues with continence and requests adult diapers as she is unable to make it to the bathroom in time. She described an event of incontinence occurring last night. After conversation about medication, pt agrees to comply, despite her reluctance due to belief her psychiatric medication is "poison." She is sleeping well (4 hours), and eating well, though she is concerned she is eating too much. Pt denies SI/HI/AVH/PI.   Urine cup provided for pt to obtain repeat UA and Cx  10/31: Pt is confused, stating she needed to get vitals done when she already had them done this AM. She is irritable when discussing medications and refuses to speak about it with writer, ending the conversation early, due to frustration.   Urine lab results reviewed and WNL.   : She continues to be labile alternating from kind and cheerful to frustrated rapidly. It appears her mood switches are based off the topic discussed. She has tangential speech and guided the conversation on her own. She admits to smoking since the age of 14 years old, stating she currently smokes 1 pack a week, as a vice when stressed and enjoys accompanying the cigarette with a beer. She speaks of her relationships with family and positive relationships with other patient's on floor. When informed the writer would like to ask just a few more questions and to discuss medications, the patient became irritated and demanded the writer leave, using profanity as the patient exited. Patient was seen on milieu dancing to the sound of the phone, stating it was for exercise. She was also seen by staff walking around with a towel on her head. Added Olanzapine 2.5 mg to be dispensed in the AM. Patient denied the medication, as well as all other medications except for her thiamin and multivitamin. She also refused her lab work.   : She continues to be labile alternating from kind and cheerful to frustrated rapidly. She continues to have tangential thought, bringing up topics on her own that frustrate her before blaming writer and using profanity until writer leaves. She often repeats words after saying them and says random words after her sentence is finished. She continues to refuse medication at times, stating she does not want poison in her body. She requests prednisone and risperidone, and gets frustrated when learning prednisone cannot be provided. When asked about taking her Depakote regularly, patient states in the past it caused her to see double and hallucinate, stating she saw "space monsters." Pt denies current AH and VH. Patient gets frustrated with writer, and asks her to leave using profanity as she exits. Before writer could speak, Ms. Trinidad opened the door and requested that writer leave, stating she will be suing the hospital so that she can leave. When asked if she is referring to a 72 hour letter, Ms. Trinidad ignores writer and continues to use profanity requesting writer leave.   Will fill out treatment over objection paper work.   : Patient continues to have tangential speech. She reports having a "good" mood and sleeping "very well" stating she slept for 4 hrs hours last night. Patient requests that she be discharged home but understands the 72 hour letter placed yesterday can take up to 3 days to be approved. Pt denies SI/HI/AVH or PI.  : Pt continues to have circumstantial speech. She is agitated that she has not been discharged and expresses frustration with the daily routine while here. She refuses to speak with staff further. Not compliant with medications and remains selective of the one's she takes.   Continuing with TOO. In regards to 72 hour letter, retention was filed.   ;;: rather pleasant with the writer; smiling ; Sleep  appetite ok; no pain issues. Not endorsing  suicidal or homicidal ideation intent or plans; no mention of auditory or visual hallucinations anxious;  somewhat internally preoccupied.  "Will you come back and talk to me?"  Yet irritable with other staff on the DASA scale. Current medications aluminum hydroxide/magnesium hydroxide/simethicone Suspension 30 milliLiter(s) Oral every 6 hours PRN  cholecalciferol 600 Unit(s) Oral daily  divalproex  milliGRAM(s) Oral two times a day for mood   folic acid 1 milliGRAM(s) Oral daily  hydroxychloroquine 200 milliGRAM(s) Oral two times a day  ibuprofen  Tablet. 200 milliGRAM(s) Oral three times a day PRN  methotrexate 7.5 milliGRAM(s) Oral <User Schedule>  multivitamin 1 Tablet(s) Oral daily  OLANZapine 5 milliGRAM(s) Oral every 8 hours PRN  OLANZapine 10 milliGRAM(s) Oral at bedtime for mood  thiamine 100 milliGRAM(s) Oral daily  ;;: in the vestibule of the special care area with her walker calm and polite;  informed more directible by staff;  Not endorsing  suicidal or homicidal ideation intent or plans; no mention of auditory or visual hallucinations wants her cell phone so she can get phone numbers.  slightly anxious.  Sleep  appetite ok; no pain issues.   Problems as per EMR: (Admit Diagnosis) RECURRENT FALLS ;(Admit Diagnosis) PSY ;(PMH) Rheumatoid arthritis ;(PMH) Anemia ;(PMH) Lupus ;(PMH) Bipolar affective ;(Problem/DX) Severe protein-calorie malnutrition ;(Problem/DX) Systemic lupus ;(Problem/DX) Rheumatoid arthritis ;(Problem/DX) Alcohol abuse ;(PSH) H/O:  section ;(R/O Problem/DX) Neurocognitive deficits ;(R/O Problem/DX) Schizoaffective disorder ;  At this time no acute issues on current medication regime.

## 2024-11-11 NOTE — BH TREATMENT PLAN - NSTXPSYCHOINTERSW_PSY_ALL_CORE
Liaise with family and collateral providers weekly for 1 hour or as needed for discharge planning purposes Liaise with family and collateral providers weekly for 1 hour or as needed for discharge planning purposes, obtain HHA

## 2024-11-11 NOTE — BH INPATIENT PSYCHIATRY PROGRESS NOTE - NSBHCHARTREVIEWVS_PSY_A_CORE FT
Vital Signs Last 24 Hrs  T(C): 36.8 (11-11-24 @ 09:27), Max: 36.8 (11-10-24 @ 17:25)  T(F): 98.3 (11-11-24 @ 09:27), Max: 98.3 (11-11-24 @ 09:27)  HR: 87 (11-11-24 @ 09:27) (87 - 88)  BP: 101/69 (11-11-24 @ 09:27) (101/69 - 137/69)  BP(mean): --  RR: 18 (11-11-24 @ 09:27) (18 - 18)  SpO2: 99% (11-11-24 @ 09:27) (99% - 100%)

## 2024-11-11 NOTE — BH INPATIENT PSYCHIATRY PROGRESS NOTE - NSBHMETABOLIC_PSY_ALL_CORE_FT
BMI: BMI (kg/m2): 18.5 (11-05-24 @ 08:51)  HbA1c: A1C with Estimated Average Glucose Result: 5.7 % (10-26-24 @ 22:00)    Glucose:   BP: 101/69 (11-11-24 @ 09:27) (101/69 - 137/69)Vital Signs Last 24 Hrs  T(C): 36.8 (11-11-24 @ 09:27), Max: 36.8 (11-10-24 @ 17:25)  T(F): 98.3 (11-11-24 @ 09:27), Max: 98.3 (11-11-24 @ 09:27)  HR: 87 (11-11-24 @ 09:27) (87 - 88)  BP: 101/69 (11-11-24 @ 09:27) (101/69 - 137/69)  BP(mean): --  RR: 18 (11-11-24 @ 09:27) (18 - 18)  SpO2: 99% (11-11-24 @ 09:27) (99% - 100%)      Lipid Panel: Date/Time: 10-26-24 @ 22:00  Cholesterol, Serum: 158  LDL Cholesterol Calculated: 85  HDL Cholesterol, Serum: 62  Total Cholesterol/HDL Ration Measurement: --  Triglycerides, Serum: 54

## 2024-11-12 PROCEDURE — 99232 SBSQ HOSP IP/OBS MODERATE 35: CPT

## 2024-11-12 RX ADMIN — Medication 1 MILLIGRAM(S): at 10:49

## 2024-11-12 RX ADMIN — Medication 1 TABLET(S): at 10:47

## 2024-11-12 RX ADMIN — HYDROXYCHLOROQUINE SULFATE 200 MILLIGRAM(S): 200 TABLET, FILM COATED ORAL at 21:29

## 2024-11-12 RX ADMIN — OLANZAPINE 10 MILLIGRAM(S): 20 TABLET ORAL at 21:29

## 2024-11-12 RX ADMIN — METHOTREXATE 7.5 MILLIGRAM(S): 2.5 TABLET ORAL at 17:58

## 2024-11-12 RX ADMIN — Medication 200 MILLIGRAM(S): at 21:30

## 2024-11-12 NOTE — BH INPATIENT PSYCHIATRY PROGRESS NOTE - CURRENT MEDICATION
MEDICATIONS  (STANDING):  cholecalciferol 600 Unit(s) Oral daily  divalproex  milliGRAM(s) Oral two times a day  folic acid 1 milliGRAM(s) Oral daily  hydroxychloroquine 200 milliGRAM(s) Oral two times a day  methotrexate 7.5 milliGRAM(s) Oral <User Schedule>  multivitamin 1 Tablet(s) Oral daily  OLANZapine 10 milliGRAM(s) Oral at bedtime  thiamine 100 milliGRAM(s) Oral daily    MEDICATIONS  (PRN):  acetaminophen     Tablet .. 650 milliGRAM(s) Oral every 6 hours PRN Moderate Pain (4 - 6)  aluminum hydroxide/magnesium hydroxide/simethicone Suspension 30 milliLiter(s) Oral every 6 hours PRN Dyspepsia  ibuprofen  Tablet. 200 milliGRAM(s) Oral three times a day PRN Moderate Pain (4 - 6)  OLANZapine 5 milliGRAM(s) Oral every 8 hours PRN agitation

## 2024-11-12 NOTE — BH INPATIENT PSYCHIATRY PROGRESS NOTE - NSBHFUPINTERVALHXFT_PSY_A_CORE
Patient seen in her room, is seated at bedside. Pleasant, hyperverbal and psychomotor agitated. She talks at length about various topics including her pair of sneakers, a close friend's family member baby shower, hops from topic to topic. She is discharge focused, wanting to know when she would be discharged from the hospital. She also states that she has no intention of taking medications and reminds writer that she hasn't taken medications in over 3 years.   She remains with irritable edge, labile. States that she only slept for 4 hours last night and has been up since 0200  MAR reviewed, she is intermittently compliant with med regimen.

## 2024-11-12 NOTE — BH INPATIENT PSYCHIATRY PROGRESS NOTE - NSBHMETABOLIC_PSY_ALL_CORE_FT
BMI: BMI (kg/m2): 18.5 (11-05-24 @ 08:51)  HbA1c: A1C with Estimated Average Glucose Result: 5.7 % (10-26-24 @ 22:00)    Glucose:   BP: 123/72 (11-12-24 @ 09:04) (101/69 - 143/62)Vital Signs Last 24 Hrs  T(C): 36.4 (11-12-24 @ 09:04), Max: 37.1 (11-11-24 @ 16:16)  T(F): 97.6 (11-12-24 @ 09:04), Max: 98.7 (11-11-24 @ 16:16)  HR: 111 (11-12-24 @ 09:04) (103 - 111)  BP: 123/72 (11-12-24 @ 09:04) (123/72 - 143/62)  BP(mean): --  RR: 18 (11-12-24 @ 09:04) (18 - 18)  SpO2: 97% (11-12-24 @ 09:04) (97% - 98%)      Lipid Panel: Date/Time: 10-26-24 @ 22:00  Cholesterol, Serum: 158  LDL Cholesterol Calculated: 85  HDL Cholesterol, Serum: 62  Total Cholesterol/HDL Ration Measurement: --  Triglycerides, Serum: 54

## 2024-11-12 NOTE — BH INPATIENT PSYCHIATRY PROGRESS NOTE - NSBHASSESSSUMMFT_PSY_ALL_CORE
Patient is a 69 year old AA female, single with adult daughter, domiciled alone previously with [facility] services, with PPH of bipolar disorder and depression per daughter and PMH of rheumatoid arthritis and lupus, who was referred by APS due to concern for poor self-care and was admitted to the medicine service for failure to thrive. Psychiatry was consulted for a psychiatric evaluation for bipolar disorder and patient was transferred to [facility] under involuntary status due to poor self-care, mood lability, and psychosis (bizarre delusion of being poisoned by TONEY newton). On evaluation, patient is significantly calmer and more cooperative today than yesterday; however, patient appears religiously preoccupied and hyperverbal today. Patient briefly appeared internally preoccupied and appeared to be speaking about being poisoned with medications with an unknown entity in the room as her gaze was focused on a specific area; however, she immediately minimized it. She presents very differently from yesterday though per daughter, patient may be attempting to feign normalcy to expedite discharge. Per collateral from daughter and APS worker supervisor, patient has demonstrated poor self-care that has deteriorated significantly in the last several months resulting in weight loss, disorganized behavior, an untenable living situation, and poor hygiene. Patient has been very irritable and angry for several years, and has a history of good medication effect when hospitalized but poor compliance in the community. Per daughter, patient has also reported auditory hallucinations recently, but has not done so previously. Patient's current presentation may be secondary to decompensation in bipolar disorder as patient's symptoms are slightly manic (hyperverbal, mood lability, Congregation preoccupation) and psychotic (auditory hallucinations, paranoia). However, given longstanding history of poor self-care and marked change in behavior a decade ago, schizoaffective disorder is more likely at this time. Patient requires inpatient psychiatric admission as she is unable to care for herself in the setting of psychosis, poor insight and judgment, and medication non-compliance.    - Continue Depakote 250 bid for mood stabilization  - Increase Zyprexa to total of 2.5mg daily/10mg QHS for psychosis, consider titrating as patient was previously on 15 mg   - methotrexate and hydroxychlorquine   - Supplement meals with Ensure  - PT evaluation and treatment due to marked difficulty in ambulating requiring assistance  - 1:1 observation discontinued    10/28: Pt is "angry" at her daughter for "tricking" her into admission. Concurrently, pt claims to be happy about her admittance as she is hopeful she will receive the recourses she feels are necessary for her self-care. She admits to poor eating due to her malodorous home. She admits to prior AVH in the setting of visual hallucinations of zombies, "naked people, bugs and AH of people cussing and people in her wall at home "shooting up" with last presentation 2 days prior to evaluation. She states she is sleeping better since her arrival. She denies current SI/HI/AVH/PI.   MMSE: 23 - Mild Cognitive Impairment.   Ordered repeat UA and Cx after viewing abnormal results on 10/24  10/29: Pt is "happy" to be here and get the care she needs. She continues to have circumstantial thought but appears to be aware of this. She admits to eating and sleeping well stating she is eating and sleeping the best she has in some time. She admits to having an issue with a pt but states she is happy to move on from it. She denies SI/HI/AVH or PI.   10/30: Pt is "excited" to learn of new roommate which she hopes to byrd with in an attempt to feel better about missing her granddaughter. She reports having issues with continence and requests adult diapers as she is unable to make it to the bathroom in time. She described an event of incontinence occurring last night. After conversation about medication, pt agrees to comply, despite her reluctance due to belief her psychiatric medication is "poison." She is sleeping well (4 hours), and eating well, though she is concerned she is eating too much. Pt denies SI/HI/AVH/PI.   Urine cup provided for pt to obtain repeat UA and Cx  10/31: Pt is confused, stating she needed to get vitals done when she already had them done this AM. She is irritable when discussing medications and refuses to speak about it with writer, ending the conversation early, due to frustration.   Urine lab results reviewed and WNL.   : She continues to be labile alternating from kind and cheerful to frustrated rapidly. It appears her mood switches are based off the topic discussed. She has tangential speech and guided the conversation on her own. She admits to smoking since the age of 14 years old, stating she currently smokes 1 pack a week, as a vice when stressed and enjoys accompanying the cigarette with a beer. She speaks of her relationships with family and positive relationships with other patient's on floor. When informed the writer would like to ask just a few more questions and to discuss medications, the patient became irritated and demanded the writer leave, using profanity as the patient exited. Patient was seen on milieu dancing to the sound of the phone, stating it was for exercise. She was also seen by staff walking around with a towel on her head. Added Olanzapine 2.5 mg to be dispensed in the AM. Patient denied the medication, as well as all other medications except for her thiamin and multivitamin. She also refused her lab work.   : She continues to be labile alternating from kind and cheerful to frustrated rapidly. She continues to have tangential thought, bringing up topics on her own that frustrate her before blaming writer and using profanity until writer leaves. She often repeats words after saying them and says random words after her sentence is finished. She continues to refuse medication at times, stating she does not want poison in her body. She requests prednisone and risperidone, and gets frustrated when learning prednisone cannot be provided. When asked about taking her Depakote regularly, patient states in the past it caused her to see double and hallucinate, stating she saw "space monsters." Pt denies current AH and VH. Patient gets frustrated with writer, and asks her to leave using profanity as she exits. Before writer could speak, Ms. Trinidad opened the door and requested that writer leave, stating she will be suing the hospital so that she can leave. When asked if she is referring to a 72 hour letter, Ms. Trinidad ignores writer and continues to use profanity requesting writer leave.   Will fill out treatment over objection paper work.   : Patient continues to have tangential speech. She reports having a "good" mood and sleeping "very well" stating she slept for 4 hrs hours last night. Patient requests that she be discharged home but understands the 72 hour letter placed yesterday can take up to 3 days to be approved. Pt denies SI/HI/AVH or PI.  : Pt continues to have circumstantial speech. She is agitated that she has not been discharged and expresses frustration with the daily routine while here. She refuses to speak with staff further. Not compliant with medications and remains selective of the one's she takes.   Continuing with TOO. In regards to 72 hour letter, retention was filed.   ;;: rather pleasant with the writer; smiling ; Sleep  appetite ok; no pain issues. Not endorsing  suicidal or homicidal ideation intent or plans; no mention of auditory or visual hallucinations anxious;  somewhat internally preoccupied.  "Will you come back and talk to me?"  Yet irritable with other staff on the DASA scale. Current medications aluminum hydroxide/magnesium hydroxide/simethicone Suspension 30 milliLiter(s) Oral every 6 hours PRN  cholecalciferol 600 Unit(s) Oral daily  divalproex  milliGRAM(s) Oral two times a day for mood   folic acid 1 milliGRAM(s) Oral daily  hydroxychloroquine 200 milliGRAM(s) Oral two times a day  ibuprofen  Tablet. 200 milliGRAM(s) Oral three times a day PRN  methotrexate 7.5 milliGRAM(s) Oral <User Schedule>  multivitamin 1 Tablet(s) Oral daily  OLANZapine 5 milliGRAM(s) Oral every 8 hours PRN  OLANZapine 10 milliGRAM(s) Oral at bedtime for mood  thiamine 100 milliGRAM(s) Oral daily  ;;: in the vestibule of the special care area with her walker calm and polite;  informed more directible by staff;  Not endorsing  suicidal or homicidal ideation intent or plans; no mention of auditory or visual hallucinations wants her cell phone so she can get phone numbers.  slightly anxious.  Sleep  appetite ok; no pain issues.   Problems as per EMR: (Admit Diagnosis) RECURRENT FALLS ;(Admit Diagnosis) PSY ;(PMH) Rheumatoid arthritis ;(PMH) Anemia ;(PMH) Lupus ;(PMH) Bipolar affective ;(Problem/DX) Severe protein-calorie malnutrition ;(Problem/DX) Systemic lupus ;(Problem/DX) Rheumatoid arthritis ;(Problem/DX) Alcohol abuse ;(PSH) H/O:  section ;(R/O Problem/DX) Neurocognitive deficits ;(R/O Problem/DX) Schizoaffective disorder ;  At this time no acute issues on current medication regime.    : Labile, elevated, observed dancing in the milieu, poor insight and judgment, med non-compliant, sleeping poorly

## 2024-11-12 NOTE — BH INPATIENT PSYCHIATRY PROGRESS NOTE - NSICDXBHSECONDARYDX_PSY_ALL_CORE
Incubation Time: 01:00:00 Alcohol abuse   F10.10  Rheumatoid arthritis   M06.9  Systemic lupus   M32.9  Severe protein-calorie malnutrition   E43

## 2024-11-12 NOTE — BH INPATIENT PSYCHIATRY PROGRESS NOTE - NSBHCHARTREVIEWVS_PSY_A_CORE FT
Vital Signs Last 24 Hrs  T(C): 36.4 (11-12-24 @ 09:04), Max: 37.1 (11-11-24 @ 16:16)  T(F): 97.6 (11-12-24 @ 09:04), Max: 98.7 (11-11-24 @ 16:16)  HR: 111 (11-12-24 @ 09:04) (103 - 111)  BP: 123/72 (11-12-24 @ 09:04) (123/72 - 143/62)  BP(mean): --  RR: 18 (11-12-24 @ 09:04) (18 - 18)  SpO2: 97% (11-12-24 @ 09:04) (97% - 98%)

## 2024-11-13 PROCEDURE — 99232 SBSQ HOSP IP/OBS MODERATE 35: CPT

## 2024-11-13 RX ADMIN — HYDROXYCHLOROQUINE SULFATE 200 MILLIGRAM(S): 200 TABLET, FILM COATED ORAL at 11:08

## 2024-11-13 RX ADMIN — Medication 1 MILLIGRAM(S): at 11:08

## 2024-11-13 RX ADMIN — Medication 1 TABLET(S): at 11:09

## 2024-11-13 RX ADMIN — Medication 200 MILLIGRAM(S): at 22:55

## 2024-11-13 RX ADMIN — Medication 200 MILLIGRAM(S): at 00:00

## 2024-11-13 NOTE — BH INPATIENT PSYCHIATRY PROGRESS NOTE - NSBHMETABOLIC_PSY_ALL_CORE_FT
BMI: BMI (kg/m2): 18.5 (11-05-24 @ 08:51)  HbA1c: A1C with Estimated Average Glucose Result: 5.7 % (10-26-24 @ 22:00)    Glucose:   BP: 151/75 (11-13-24 @ 08:58) (101/69 - 151/75)Vital Signs Last 24 Hrs  T(C): 37.2 (11-12-24 @ 16:14), Max: 37.2 (11-12-24 @ 16:14)  T(F): 98.9 (11-12-24 @ 16:14), Max: 98.9 (11-12-24 @ 16:14)  HR: 95 (11-13-24 @ 08:58) (95 - 109)  BP: 151/75 (11-13-24 @ 08:58) (133/82 - 151/75)  BP(mean): --  RR: --  SpO2: 99% (11-13-24 @ 08:58) (96% - 99%)      Lipid Panel: Date/Time: 10-26-24 @ 22:00  Cholesterol, Serum: 158  LDL Cholesterol Calculated: 85  HDL Cholesterol, Serum: 62  Total Cholesterol/HDL Ration Measurement: --  Triglycerides, Serum: 54

## 2024-11-13 NOTE — BH INPATIENT PSYCHIATRY PROGRESS NOTE - NSBHCHARTREVIEWVS_PSY_A_CORE FT
Vital Signs Last 24 Hrs  T(C): 37.2 (11-12-24 @ 16:14), Max: 37.2 (11-12-24 @ 16:14)  T(F): 98.9 (11-12-24 @ 16:14), Max: 98.9 (11-12-24 @ 16:14)  HR: 95 (11-13-24 @ 08:58) (95 - 109)  BP: 151/75 (11-13-24 @ 08:58) (133/82 - 151/75)  BP(mean): --  RR: --  SpO2: 99% (11-13-24 @ 08:58) (96% - 99%)

## 2024-11-13 NOTE — BH INPATIENT PSYCHIATRY PROGRESS NOTE - NSBHASSESSSUMMFT_PSY_ALL_CORE
Patient is a 69 year old AA female, single with adult daughter, domiciled alone previously with [facility] services, with PPH of bipolar disorder and depression per daughter and PMH of rheumatoid arthritis and lupus, who was referred by APS due to concern for poor self-care and was admitted to the medicine service for failure to thrive. Psychiatry was consulted for a psychiatric evaluation for bipolar disorder and patient was transferred to [facility] under involuntary status due to poor self-care, mood lability, and psychosis (bizarre delusion of being poisoned by TONEY newton). On evaluation, patient is significantly calmer and more cooperative today than yesterday; however, patient appears religiously preoccupied and hyperverbal today. Patient briefly appeared internally preoccupied and appeared to be speaking about being poisoned with medications with an unknown entity in the room as her gaze was focused on a specific area; however, she immediately minimized it. She presents very differently from yesterday though per daughter, patient may be attempting to feign normalcy to expedite discharge. Per collateral from daughter and APS worker supervisor, patient has demonstrated poor self-care that has deteriorated significantly in the last several months resulting in weight loss, disorganized behavior, an untenable living situation, and poor hygiene. Patient has been very irritable and angry for several years, and has a history of good medication effect when hospitalized but poor compliance in the community. Per daughter, patient has also reported auditory hallucinations recently, but has not done so previously. Patient's current presentation may be secondary to decompensation in bipolar disorder as patient's symptoms are slightly manic (hyperverbal, mood lability, Sikh preoccupation) and psychotic (auditory hallucinations, paranoia). However, given longstanding history of poor self-care and marked change in behavior a decade ago, schizoaffective disorder is more likely at this time. Patient requires inpatient psychiatric admission as she is unable to care for herself in the setting of psychosis, poor insight and judgment, and medication non-compliance.    - Continue Depakote 250 bid for mood stabilization  - Increase Zyprexa to total of 2.5mg daily/10mg QHS for psychosis, consider titrating as patient was previously on 15 mg   - methotrexate and hydroxychlorquine   - Supplement meals with Ensure  - PT evaluation and treatment due to marked difficulty in ambulating requiring assistance  - 1:1 observation discontinued    10/28: Pt is "angry" at her daughter for "tricking" her into admission. Concurrently, pt claims to be happy about her admittance as she is hopeful she will receive the recourses she feels are necessary for her self-care. She admits to poor eating due to her malodorous home. She admits to prior AVH in the setting of visual hallucinations of zombies, "naked people, bugs and AH of people cussing and people in her wall at home "shooting up" with last presentation 2 days prior to evaluation. She states she is sleeping better since her arrival. She denies current SI/HI/AVH/PI.   MMSE: 23 - Mild Cognitive Impairment.   Ordered repeat UA and Cx after viewing abnormal results on 10/24  10/29: Pt is "happy" to be here and get the care she needs. She continues to have circumstantial thought but appears to be aware of this. She admits to eating and sleeping well stating she is eating and sleeping the best she has in some time. She admits to having an issue with a pt but states she is happy to move on from it. She denies SI/HI/AVH or PI.   10/30: Pt is "excited" to learn of new roommate which she hopes to byrd with in an attempt to feel better about missing her granddaughter. She reports having issues with continence and requests adult diapers as she is unable to make it to the bathroom in time. She described an event of incontinence occurring last night. After conversation about medication, pt agrees to comply, despite her reluctance due to belief her psychiatric medication is "poison." She is sleeping well (4 hours), and eating well, though she is concerned she is eating too much. Pt denies SI/HI/AVH/PI.   Urine cup provided for pt to obtain repeat UA and Cx  10/31: Pt is confused, stating she needed to get vitals done when she already had them done this AM. She is irritable when discussing medications and refuses to speak about it with writer, ending the conversation early, due to frustration.   Urine lab results reviewed and WNL.   : She continues to be labile alternating from kind and cheerful to frustrated rapidly. It appears her mood switches are based off the topic discussed. She has tangential speech and guided the conversation on her own. She admits to smoking since the age of 14 years old, stating she currently smokes 1 pack a week, as a vice when stressed and enjoys accompanying the cigarette with a beer. She speaks of her relationships with family and positive relationships with other patient's on floor. When informed the writer would like to ask just a few more questions and to discuss medications, the patient became irritated and demanded the writer leave, using profanity as the patient exited. Patient was seen on milieu dancing to the sound of the phone, stating it was for exercise. She was also seen by staff walking around with a towel on her head. Added Olanzapine 2.5 mg to be dispensed in the AM. Patient denied the medication, as well as all other medications except for her thiamin and multivitamin. She also refused her lab work.   : She continues to be labile alternating from kind and cheerful to frustrated rapidly. She continues to have tangential thought, bringing up topics on her own that frustrate her before blaming writer and using profanity until writer leaves. She often repeats words after saying them and says random words after her sentence is finished. She continues to refuse medication at times, stating she does not want poison in her body. She requests prednisone and risperidone, and gets frustrated when learning prednisone cannot be provided. When asked about taking her Depakote regularly, patient states in the past it caused her to see double and hallucinate, stating she saw "space monsters." Pt denies current AH and VH. Patient gets frustrated with writer, and asks her to leave using profanity as she exits. Before writer could speak, Ms. Trinidad opened the door and requested that writer leave, stating she will be suing the hospital so that she can leave. When asked if she is referring to a 72 hour letter, Ms. Trinidad ignores writer and continues to use profanity requesting writer leave.   Will fill out treatment over objection paper work.   : Patient continues to have tangential speech. She reports having a "good" mood and sleeping "very well" stating she slept for 4 hrs hours last night. Patient requests that she be discharged home but understands the 72 hour letter placed yesterday can take up to 3 days to be approved. Pt denies SI/HI/AVH or PI.  : Pt continues to have circumstantial speech. She is agitated that she has not been discharged and expresses frustration with the daily routine while here. She refuses to speak with staff further. Not compliant with medications and remains selective of the one's she takes.   Continuing with TOO. In regards to 72 hour letter, retention was filed.   ;;: rather pleasant with the writer; smiling ; Sleep  appetite ok; no pain issues. Not endorsing  suicidal or homicidal ideation intent or plans; no mention of auditory or visual hallucinations anxious;  somewhat internally preoccupied.  "Will you come back and talk to me?"  Yet irritable with other staff on the DASA scale. Current medications aluminum hydroxide/magnesium hydroxide/simethicone Suspension 30 milliLiter(s) Oral every 6 hours PRN  cholecalciferol 600 Unit(s) Oral daily  divalproex  milliGRAM(s) Oral two times a day for mood   folic acid 1 milliGRAM(s) Oral daily  hydroxychloroquine 200 milliGRAM(s) Oral two times a day  ibuprofen  Tablet. 200 milliGRAM(s) Oral three times a day PRN  methotrexate 7.5 milliGRAM(s) Oral <User Schedule>  multivitamin 1 Tablet(s) Oral daily  OLANZapine 5 milliGRAM(s) Oral every 8 hours PRN  OLANZapine 10 milliGRAM(s) Oral at bedtime for mood  thiamine 100 milliGRAM(s) Oral daily  ;;: in the vestibule of the special care area with her walker calm and polite;  informed more directible by staff;  Not endorsing  suicidal or homicidal ideation intent or plans; no mention of auditory or visual hallucinations wants her cell phone so she can get phone numbers.  slightly anxious.  Sleep  appetite ok; no pain issues.   Problems as per EMR: (Admit Diagnosis) RECURRENT FALLS ;(Admit Diagnosis) PSY ;(PMH) Rheumatoid arthritis ;(PMH) Anemia ;(PMH) Lupus ;(PMH) Bipolar affective ;(Problem/DX) Severe protein-calorie malnutrition ;(Problem/DX) Systemic lupus ;(Problem/DX) Rheumatoid arthritis ;(Problem/DX) Alcohol abuse ;(PSH) H/O:  section ;(R/O Problem/DX) Neurocognitive deficits ;(R/O Problem/DX) Schizoaffective disorder ;  At this time no acute issues on current medication regime.    : Labile, elevated, observed dancing in the milieu, poor insight and judgment, med non-compliant, sleeping poorly   Patient noted to have euthymic mood, did take zyprexa last night, no irritability or lability today. No si/hi/avh or PI. Anticipating going to court for retention today

## 2024-11-13 NOTE — BH INPATIENT PSYCHIATRY PROGRESS NOTE - NSBHFUPINTERVALHXFT_PSY_A_CORE
Patient visible on the unit, seen at the medication line, taking some meds today. She is noted to be calm and pleasant, reports that she slept well last night and feels good this morning. She shares that she thought about taking her depakote last night, but doesn't like that fact that the pill is so large. We spoke about switching to depakene, which she was open to but she stated that she would wait until court today before making a decision.   MAR reviewed, she did take zyprexa last night. Refuses all other medications.

## 2024-11-14 PROCEDURE — 99232 SBSQ HOSP IP/OBS MODERATE 35: CPT

## 2024-11-14 NOTE — BH CHART NOTE - NSEVENTNOTEFT_PSY_ALL_CORE
Daughter Abril Trinidad at  [phone] returned phone call is and willing to testify in court to retain mom for further treatment

## 2024-11-14 NOTE — BH INPATIENT PSYCHIATRY PROGRESS NOTE - NSBHASSESSSUMMFT_PSY_ALL_CORE
Patient is a 69 year old AA female, single with adult daughter, domiciled alone previously with [facility] services, with PPH of bipolar disorder and depression per daughter and PMH of rheumatoid arthritis and lupus, who was referred by APS due to concern for poor self-care and was admitted to the medicine service for failure to thrive. Psychiatry was consulted for a psychiatric evaluation for bipolar disorder and patient was transferred to [email or web address] under involuntary status due to poor self-care, mood lability, and psychosis (bizarre delusion of being poisoned by TONEY newton). On evaluation, patient is significantly calmer and more cooperative today than yesterday; however, patient appears religiously preoccupied and hyperverbal today. Patient briefly appeared internally preoccupied and appeared to be speaking about being poisoned with medications with an unknown entity in the room as her gaze was focused on a specific area; however, she immediately minimized it. She presents very differently from yesterday though per daughter, patient may be attempting to feign normalcy to expedite discharge. Per collateral from daughter and APS worker supervisor, patient has demonstrated poor self-care that has deteriorated significantly in the last several months resulting in weight loss, disorganized behavior, an untenable living situation, and poor hygiene. Patient has been very irritable and angry for several years, and has a history of good medication effect when hospitalized but poor compliance in the community. Per daughter, patient has also reported auditory hallucinations recently, but has not done so previously. Patient's current presentation may be secondary to decompensation in bipolar disorder as patient's symptoms are slightly manic (hyperverbal, mood lability, Confucianism preoccupation) and psychotic (auditory hallucinations, paranoia). However, given longstanding history of poor self-care and marked change in behavior a decade ago, schizoaffective disorder is more likely at this time. Patient requires inpatient psychiatric admission as she is unable to care for herself in the setting of psychosis, poor insight and judgment, and medication non-compliance.    - Continue Depakote 250 bid for mood stabilization  -  Zyprexa 10mg QHS for psychosis, consider titrating as patient was previously on 15 mg   - methotrexate and hydroxychlorquine   - Supplement meals with Ensure  - PT evaluation and treatment due to marked difficulty in ambulating requiring assistance      10/28: Pt is "angry" at her daughter for "tricking" her into admission. Concurrently, pt claims to be happy about her admittance as she is hopeful she will receive the recourses she feels are necessary for her self-care. She admits to poor eating due to her malodorous home. She admits to prior AVH in the setting of visual hallucinations of zombies, "naked people, bugs and AH of people cussing and people in her wall at home "shooting up" with last presentation 2 days prior to evaluation. She states she is sleeping better since her arrival. She denies current SI/HI/AVH/PI.   MMSE: 23 - Mild Cognitive Impairment.   Ordered repeat UA and Cx after viewing abnormal results on 10/24  10/29: Pt is "happy" to be here and get the care she needs. She continues to have circumstantial thought but appears to be aware of this. She admits to eating and sleeping well stating she is eating and sleeping the best she has in some time. She admits to having an issue with a pt but states she is happy to move on from it. She denies SI/HI/AVH or PI.   10/30: Pt is "excited" to learn of new roommate which she hopes to byrd with in an attempt to feel better about missing her granddaughter. She reports having issues with continence and requests adult diapers as she is unable to make it to the bathroom in time. She described an event of incontinence occurring last night. After conversation about medication, pt agrees to comply, despite her reluctance due to belief her psychiatric medication is "poison." She is sleeping well (4 hours), and eating well, though she is concerned she is eating too much. Pt denies SI/HI/AVH/PI.   Urine cup provided for pt to obtain repeat UA and Cx  10/31: Pt is confused, stating she needed to get vitals done when she already had them done this AM. She is irritable when discussing medications and refuses to speak about it with writer, ending the conversation early, due to frustration.   Urine lab results reviewed and WNL.   : She continues to be labile alternating from kind and cheerful to frustrated rapidly. It appears her mood switches are based off the topic discussed. She has tangential speech and guided the conversation on her own. She admits to smoking since the age of 14 years old, stating she currently smokes 1 pack a week, as a vice when stressed and enjoys accompanying the cigarette with a beer. She speaks of her relationships with family and positive relationships with other patient's on floor. When informed the writer would like to ask just a few more questions and to discuss medications, the patient became irritated and demanded the writer leave, using profanity as the patient exited. Patient was seen on milieu dancing to the sound of the phone, stating it was for exercise. She was also seen by staff walking around with a towel on her head. Added Olanzapine 2.5 mg to be dispensed in the AM. Patient denied the medication, as well as all other medications except for her thiamin and multivitamin. She also refused her lab work.   : She continues to be labile alternating from kind and cheerful to frustrated rapidly. She continues to have tangential thought, bringing up topics on her own that frustrate her before blaming writer and using profanity until writer leaves. She often repeats words after saying them and says random words after her sentence is finished. She continues to refuse medication at times, stating she does not want poison in her body. She requests prednisone and risperidone, and gets frustrated when learning prednisone cannot be provided. When asked about taking her Depakote regularly, patient states in the past it caused her to see double and hallucinate, stating she saw "space monsters." Pt denies current AH and VH. Patient gets frustrated with writer, and asks her to leave using profanity as she exits. Before writer could speak, Ms. Trinidad opened the door and requested that writer leave, stating she will be suing the hospital so that she can leave. When asked if she is referring to a 72 hour letter, Ms. Trinidad ignores writer and continues to use profanity requesting writer leave.   Will fill out treatment over objection paper work.   : Patient continues to have tangential speech. She reports having a "good" mood and sleeping "very well" stating she slept for 4 hrs hours last night. Patient requests that she be discharged home but understands the 72 hour letter placed yesterday can take up to 3 days to be approved. Pt denies SI/HI/AVH or PI.  : Pt continues to have circumstantial speech. She is agitated that she has not been discharged and expresses frustration with the daily routine while here. She refuses to speak with staff further. Not compliant with medications and remains selective of the one's she takes.   Continuing with TOO. In regards to 72 hour letter, retention was filed.   ;;: rather pleasant with the writer; smiling ; Sleep  appetite ok; no pain issues. Not endorsing  suicidal or homicidal ideation intent or plans; no mention of auditory or visual hallucinations anxious;  somewhat internally preoccupied.  "Will you come back and talk to me?"  Yet irritable with other staff on the DASA scale. Current medications aluminum hydroxide/magnesium hydroxide/simethicone Suspension 30 milliLiter(s) Oral every 6 hours PRN  cholecalciferol 600 Unit(s) Oral daily  divalproex  milliGRAM(s) Oral two times a day for mood   folic acid 1 milliGRAM(s) Oral daily  hydroxychloroquine 200 milliGRAM(s) Oral two times a day  ibuprofen  Tablet. 200 milliGRAM(s) Oral three times a day PRN  methotrexate 7.5 milliGRAM(s) Oral <User Schedule>  multivitamin 1 Tablet(s) Oral daily  OLANZapine 5 milliGRAM(s) Oral every 8 hours PRN  OLANZapine 10 milliGRAM(s) Oral at bedtime for mood  thiamine 100 milliGRAM(s) Oral daily  ;;: in the vestibule of the special care area with her walker calm and polite;  informed more directible by staff;  Not endorsing  suicidal or homicidal ideation intent or plans; no mention of auditory or visual hallucinations wants her cell phone so she can get phone numbers.  slightly anxious.  Sleep  appetite ok; no pain issues.   Problems as per EMR: (Admit Diagnosis) RECURRENT FALLS ;(Admit Diagnosis) PSY ;(PMH) Rheumatoid arthritis ;(PMH) Anemia ;(PMH) Lupus ;(PMH) Bipolar affective ;(Problem/DX) Severe protein-calorie malnutrition ;(Problem/DX) Systemic lupus ;(Problem/DX) Rheumatoid arthritis ;(Problem/DX) Alcohol abuse ;(PSH) H/O:  section ;(R/O Problem/DX) Neurocognitive deficits ;(R/O Problem/DX) Schizoaffective disorder ;  At this time no acute issues on current medication regime.    : Labile, elevated, observed dancing in the milieu, poor insight and judgment, med non-compliant, sleeping poorly   Patient noted to have euthymic mood, did take zyprexa last night, no irritability or lability today. No si/hi/avh or PI. Anticipating going to court for retention today   Patient remains labile with fluctuating moods of irritability and elevated euphoria. Refused all psychiatric medications last night and this morning. No reported si/hi/avh or PI. Patient is awaiting court today for retention.

## 2024-11-14 NOTE — BH INPATIENT PSYCHIATRY PROGRESS NOTE - NSBHCHARTREVIEWVS_PSY_A_CORE FT
Vital Signs Last 24 Hrs  T(C): 36.5 (11-14-24 @ 08:44), Max: 36.6 (11-13-24 @ 16:46)  T(F): 97.7 (11-14-24 @ 08:44), Max: 97.9 (11-13-24 @ 16:46)  HR: 84 (11-14-24 @ 08:44) (84 - 86)  BP: 149/93 (11-14-24 @ 08:44) (139/77 - 149/93)  BP(mean): --  RR: --  SpO2: 100% (11-14-24 @ 08:44) (99% - 100%)

## 2024-11-14 NOTE — BH INPATIENT PSYCHIATRY PROGRESS NOTE - NSBHFUPINTERVALHXFT_PSY_A_CORE
Patient visible on the unit, noted to be frequently dancing to herself, smiling broadly. On noticing writer, she asks for assistance with her pants which had fallen to her knees. Writer assisted patient who continued to dance and gyrate while her pants were being pulled up. She did not want her pants to be buttoned or zipped up and stated that she was having the same prenatal symptoms of her pregnant granddaughter  She is discharge focused, stating that she anticipates being discharged today and intends on going back to her apartment this evening. She has no intention of seeing or of living with her daughter.  Writer spoke with pt's daughter who stated that she would testify in court alongside treatment due to concerns of mother not being able to take care of herself in context of her psychiatric illness.   MAR was reviewed, pt refused all standing meds last night. No si/hi/avh or PI. No insight into illness and need for treatment.

## 2024-11-14 NOTE — BH CHART NOTE - NSEVENTNOTEFT_PSY_ALL_CORE
Voice message left for patient's daughter Abril Trinidad at  [phone] with detailed message regarding 72 hr letter and subsequent plans

## 2024-11-14 NOTE — BH INPATIENT PSYCHIATRY PROGRESS NOTE - NSBHMETABOLIC_PSY_ALL_CORE_FT
BMI: BMI (kg/m2): 18.5 (11-05-24 @ 08:51)  HbA1c: A1C with Estimated Average Glucose Result: 5.7 % (10-26-24 @ 22:00)    Glucose:   BP: 149/93 (11-14-24 @ 08:44) (123/72 - 151/75)Vital Signs Last 24 Hrs  T(C): 36.5 (11-14-24 @ 08:44), Max: 36.6 (11-13-24 @ 16:46)  T(F): 97.7 (11-14-24 @ 08:44), Max: 97.9 (11-13-24 @ 16:46)  HR: 84 (11-14-24 @ 08:44) (84 - 86)  BP: 149/93 (11-14-24 @ 08:44) (139/77 - 149/93)  BP(mean): --  RR: --  SpO2: 100% (11-14-24 @ 08:44) (99% - 100%)      Lipid Panel: Date/Time: 10-26-24 @ 22:00  Cholesterol, Serum: 158  LDL Cholesterol Calculated: 85  HDL Cholesterol, Serum: 62  Total Cholesterol/HDL Ration Measurement: --  Triglycerides, Serum: 54

## 2024-11-16 RX ADMIN — Medication 250 MILLIGRAM(S): at 23:15

## 2024-11-16 RX ADMIN — Medication 600 UNIT(S): at 10:15

## 2024-11-16 RX ADMIN — Medication 200 MILLIGRAM(S): at 11:47

## 2024-11-16 RX ADMIN — HYDROXYCHLOROQUINE SULFATE 200 MILLIGRAM(S): 200 TABLET, FILM COATED ORAL at 10:13

## 2024-11-16 RX ADMIN — Medication 1 TABLET(S): at 10:13

## 2024-11-16 RX ADMIN — Medication 200 MILLIGRAM(S): at 10:49

## 2024-11-17 RX ADMIN — Medication 1 TABLET(S): at 13:29

## 2024-11-18 PROCEDURE — 99232 SBSQ HOSP IP/OBS MODERATE 35: CPT

## 2024-11-18 RX ADMIN — HYDROXYCHLOROQUINE SULFATE 200 MILLIGRAM(S): 200 TABLET, FILM COATED ORAL at 22:20

## 2024-11-18 NOTE — BH INPATIENT PSYCHIATRY PROGRESS NOTE - NSBHCHARTREVIEWVS_PSY_A_CORE FT
Vital Signs Last 24 Hrs  T(C): 36.4 (11-18-24 @ 09:41), Max: 36.7 (11-17-24 @ 16:50)  T(F): 97.6 (11-18-24 @ 09:41), Max: 98.1 (11-17-24 @ 16:50)  HR: 85 (11-18-24 @ 09:41) (80 - 85)  BP: 144/84 (11-18-24 @ 09:41) (109/72 - 144/84)  BP(mean): --  RR: 18 (11-17-24 @ 16:50) (18 - 18)  SpO2: 99% (11-18-24 @ 09:41) (99% - 100%)

## 2024-11-18 NOTE — BH INPATIENT PSYCHIATRY PROGRESS NOTE - NSBHFUPINTERVALCCFT_PSY_A_CORE
'Get me the fuck out!' Acitretin Counseling:  I discussed with the patient the risks of acitretin including but not limited to hair loss, dry lips/skin/eyes, liver damage, hyperlipidemia, depression/suicidal ideation, photosensitivity.  Serious rare side effects can include but are not limited to pancreatitis, pseudotumor cerebri, bony changes, clot formation/stroke/heart attack.  Patient understands that alcohol is contraindicated since it can result in liver toxicity and significantly prolong the elimination of the drug by many years.

## 2024-11-18 NOTE — BH INPATIENT PSYCHIATRY PROGRESS NOTE - NSBHASSESSSUMMFT_PSY_ALL_CORE
Patient is a 69 year old AA female, single with adult daughter, domiciled alone previously with [facility] services, with PPH of bipolar disorder and depression per daughter and PMH of rheumatoid arthritis and lupus, who was referred by APS due to concern for poor self-care and was admitted to the medicine service for failure to thrive. Psychiatry was consulted for a psychiatric evaluation for bipolar disorder and patient was transferred to [facility] under involuntary status due to poor self-care, mood lability, and psychosis (bizarre delusion of being poisoned by TONEY newton). On evaluation, patient is significantly calmer and more cooperative today than yesterday; however, patient appears religiously preoccupied and hyperverbal today. Patient briefly appeared internally preoccupied and appeared to be speaking about being poisoned with medications with an unknown entity in the room as her gaze was focused on a specific area; however, she immediately minimized it. She presents very differently from yesterday though per daughter, patient may be attempting to feign normalcy to expedite discharge. Per collateral from daughter and APS worker supervisor, patient has demonstrated poor self-care that has deteriorated significantly in the last several months resulting in weight loss, disorganized behavior, an untenable living situation, and poor hygiene. Patient has been very irritable and angry for several years, and has a history of good medication effect when hospitalized but poor compliance in the community. Per daughter, patient has also reported auditory hallucinations recently, but has not done so previously. Patient's current presentation may be secondary to decompensation in bipolar disorder as patient's symptoms are slightly manic (hyperverbal, mood lability, Yazidism preoccupation) and psychotic (auditory hallucinations, paranoia). However, given longstanding history of poor self-care and marked change in behavior a decade ago, schizoaffective disorder is more likely at this time. Patient requires inpatient psychiatric admission as she is unable to care for herself in the setting of psychosis, poor insight and judgment, and medication non-compliance.    - Continue Depakote 250 bid for mood stabilization  -  Zyprexa 10mg QHS for psychosis, consider titrating as patient was previously on 15 mg   - methotrexate and hydroxychlorquine   - Supplement meals with Ensure  - PT evaluation and treatment due to marked difficulty in ambulating requiring assistance      10/28: Pt is "angry" at her daughter for "tricking" her into admission. Concurrently, pt claims to be happy about her admittance as she is hopeful she will receive the recourses she feels are necessary for her self-care. She admits to poor eating due to her malodorous home. She admits to prior AVH in the setting of visual hallucinations of zombies, "naked people, bugs and AH of people cussing and people in her wall at home "shooting up" with last presentation 2 days prior to evaluation. She states she is sleeping better since her arrival. She denies current SI/HI/AVH/PI.   MMSE: 23 - Mild Cognitive Impairment.   Ordered repeat UA and Cx after viewing abnormal results on 10/24  10/29: Pt is "happy" to be here and get the care she needs. She continues to have circumstantial thought but appears to be aware of this. She admits to eating and sleeping well stating she is eating and sleeping the best she has in some time. She admits to having an issue with a pt but states she is happy to move on from it. She denies SI/HI/AVH or PI.   10/30: Pt is "excited" to learn of new roommate which she hopes to byrd with in an attempt to feel better about missing her granddaughter. She reports having issues with continence and requests adult diapers as she is unable to make it to the bathroom in time. She described an event of incontinence occurring last night. After conversation about medication, pt agrees to comply, despite her reluctance due to belief her psychiatric medication is "poison." She is sleeping well (4 hours), and eating well, though she is concerned she is eating too much. Pt denies SI/HI/AVH/PI.   Urine cup provided for pt to obtain repeat UA and Cx  10/31: Pt is confused, stating she needed to get vitals done when she already had them done this AM. She is irritable when discussing medications and refuses to speak about it with writer, ending the conversation early, due to frustration.   Urine lab results reviewed and WNL.   : She continues to be labile alternating from kind and cheerful to frustrated rapidly. It appears her mood switches are based off the topic discussed. She has tangential speech and guided the conversation on her own. She admits to smoking since the age of 14 years old, stating she currently smokes 1 pack a week, as a vice when stressed and enjoys accompanying the cigarette with a beer. She speaks of her relationships with family and positive relationships with other patient's on floor. When informed the writer would like to ask just a few more questions and to discuss medications, the patient became irritated and demanded the writer leave, using profanity as the patient exited. Patient was seen on milieu dancing to the sound of the phone, stating it was for exercise. She was also seen by staff walking around with a towel on her head. Added Olanzapine 2.5 mg to be dispensed in the AM. Patient denied the medication, as well as all other medications except for her thiamin and multivitamin. She also refused her lab work.   : She continues to be labile alternating from kind and cheerful to frustrated rapidly. She continues to have tangential thought, bringing up topics on her own that frustrate her before blaming writer and using profanity until writer leaves. She often repeats words after saying them and says random words after her sentence is finished. She continues to refuse medication at times, stating she does not want poison in her body. She requests prednisone and risperidone, and gets frustrated when learning prednisone cannot be provided. When asked about taking her Depakote regularly, patient states in the past it caused her to see double and hallucinate, stating she saw "space monsters." Pt denies current AH and VH. Patient gets frustrated with writer, and asks her to leave using profanity as she exits. Before writer could speak, Ms. Trinidad opened the door and requested that writer leave, stating she will be suing the hospital so that she can leave. When asked if she is referring to a 72 hour letter, Ms. Trinidad ignores writer and continues to use profanity requesting writer leave.   Will fill out treatment over objection paper work.   : Patient continues to have tangential speech. She reports having a "good" mood and sleeping "very well" stating she slept for 4 hrs hours last night. Patient requests that she be discharged home but understands the 72 hour letter placed yesterday can take up to 3 days to be approved. Pt denies SI/HI/AVH or PI.  : Pt continues to have circumstantial speech. She is agitated that she has not been discharged and expresses frustration with the daily routine while here. She refuses to speak with staff further. Not compliant with medications and remains selective of the one's she takes.   Continuing with TOO. In regards to 72 hour letter, retention was filed.   ;;: rather pleasant with the writer; smiling ; Sleep  appetite ok; no pain issues. Not endorsing  suicidal or homicidal ideation intent or plans; no mention of auditory or visual hallucinations anxious;  somewhat internally preoccupied.  "Will you come back and talk to me?"  Yet irritable with other staff on the DASA scale. Current medications aluminum hydroxide/magnesium hydroxide/simethicone Suspension 30 milliLiter(s) Oral every 6 hours PRN  cholecalciferol 600 Unit(s) Oral daily  divalproex  milliGRAM(s) Oral two times a day for mood   folic acid 1 milliGRAM(s) Oral daily  hydroxychloroquine 200 milliGRAM(s) Oral two times a day  ibuprofen  Tablet. 200 milliGRAM(s) Oral three times a day PRN  methotrexate 7.5 milliGRAM(s) Oral <User Schedule>  multivitamin 1 Tablet(s) Oral daily  OLANZapine 5 milliGRAM(s) Oral every 8 hours PRN  OLANZapine 10 milliGRAM(s) Oral at bedtime for mood  thiamine 100 milliGRAM(s) Oral daily  ;;: in the vestibule of the special care area with her walker calm and polite;  informed more directible by staff;  Not endorsing  suicidal or homicidal ideation intent or plans; no mention of auditory or visual hallucinations wants her cell phone so she can get phone numbers.  slightly anxious.  Sleep  appetite ok; no pain issues.   Problems as per EMR: (Admit Diagnosis) RECURRENT FALLS ;(Admit Diagnosis) PSY ;(PMH) Rheumatoid arthritis ;(PMH) Anemia ;(PMH) Lupus ;(PMH) Bipolar affective ;(Problem/DX) Severe protein-calorie malnutrition ;(Problem/DX) Systemic lupus ;(Problem/DX) Rheumatoid arthritis ;(Problem/DX) Alcohol abuse ;(PSH) H/O:  section ;(R/O Problem/DX) Neurocognitive deficits ;(R/O Problem/DX) Schizoaffective disorder ;  At this time no acute issues on current medication regime.    : Labile, elevated, observed dancing in the milieu, poor insight and judgment, med non-compliant, sleeping poorly   Patient noted to have euthymic mood, did take zyprexa last night, no irritability or lability today. No si/hi/avh or PI. Anticipating going to court for retention today   Patient remains labile with fluctuating moods of irritability and elevated euphoria. Refused all psychiatric medications last night and this morning. No reported si/hi/avh or PI. Patient is awaiting court today for retention.    Patient refusing all medications since losing in court last week (retention), loud, irritable, cursing/foul language, frequently dancing and gyrating in the halls and in the chair

## 2024-11-18 NOTE — BH INPATIENT PSYCHIATRY PROGRESS NOTE - NSBHMETABOLIC_PSY_ALL_CORE_FT
BMI: BMI (kg/m2): 18.5 (11-05-24 @ 08:51)  HbA1c: A1C with Estimated Average Glucose Result: 5.7 % (10-26-24 @ 22:00)    Glucose:   BP: 144/84 (11-18-24 @ 09:41) (108/66 - 144/84)Vital Signs Last 24 Hrs  T(C): 36.4 (11-18-24 @ 09:41), Max: 36.7 (11-17-24 @ 16:50)  T(F): 97.6 (11-18-24 @ 09:41), Max: 98.1 (11-17-24 @ 16:50)  HR: 85 (11-18-24 @ 09:41) (80 - 85)  BP: 144/84 (11-18-24 @ 09:41) (109/72 - 144/84)  BP(mean): --  RR: 18 (11-17-24 @ 16:50) (18 - 18)  SpO2: 99% (11-18-24 @ 09:41) (99% - 100%)      Lipid Panel: Date/Time: 10-26-24 @ 22:00  Cholesterol, Serum: 158  LDL Cholesterol Calculated: 85  HDL Cholesterol, Serum: 62  Total Cholesterol/HDL Ration Measurement: --  Triglycerides, Serum: 54

## 2024-11-18 NOTE — BH INPATIENT PSYCHIATRY PROGRESS NOTE - NSBHFUPINTERVALHXFT_PSY_A_CORE
Patient dancing to self, gyrating in the chair with feet up in the air. Upon approach she states that people want to 'do it' with her and goes off on a tangent about 'dirty men wanting to have sex with me.' Loud and frequently cursing, she demands discharge and reported wanting a new  and a new . MAR reviewed and pt continues to refuse all medications. When med regimen was discharge she began to yell that she would not take medications. Became irate, irritable.   TOO submitted

## 2024-11-18 NOTE — BH TREATMENT PLAN - NSTXPSYCHOINTERSW_PSY_ALL_CORE
Liaise with family and collateral providers weekly for 1 hour or as needed for discharge planning purposes, obtain HHA

## 2024-11-19 PROCEDURE — 99232 SBSQ HOSP IP/OBS MODERATE 35: CPT

## 2024-11-19 NOTE — BH INPATIENT PSYCHIATRY PROGRESS NOTE - NSBHASSESSSUMMFT_PSY_ALL_CORE
Patient is a 69 year old AA female, single with adult daughter, domiciled alone previously with [facility] services, with PPH of bipolar disorder and depression per daughter and PMH of rheumatoid arthritis and lupus, who was referred by APS due to concern for poor self-care and was admitted to the medicine service for failure to thrive. Psychiatry was consulted for a psychiatric evaluation for bipolar disorder and patient was transferred to [facility] under involuntary status due to poor self-care, mood lability, and psychosis (bizarre delusion of being poisoned by TONEY newton). On evaluation, patient is significantly calmer and more cooperative today than yesterday; however, patient appears religiously preoccupied and hyperverbal today. Patient briefly appeared internally preoccupied and appeared to be speaking about being poisoned with medications with an unknown entity in the room as her gaze was focused on a specific area; however, she immediately minimized it. She presents very differently from yesterday though per daughter, patient may be attempting to feign normalcy to expedite discharge. Per collateral from daughter and APS worker supervisor, patient has demonstrated poor self-care that has deteriorated significantly in the last several months resulting in weight loss, disorganized behavior, an untenable living situation, and poor hygiene. Patient has been very irritable and angry for several years, and has a history of good medication effect when hospitalized but poor compliance in the community. Per daughter, patient has also reported auditory hallucinations recently, but has not done so previously. Patient's current presentation may be secondary to decompensation in bipolar disorder as patient's symptoms are slightly manic (hyperverbal, mood lability, Methodist preoccupation) and psychotic (auditory hallucinations, paranoia). However, given longstanding history of poor self-care and marked change in behavior a decade ago, schizoaffective disorder is more likely at this time. Patient requires inpatient psychiatric admission as she is unable to care for herself in the setting of psychosis, poor insight and judgment, and medication non-compliance.    - Continue Depakote 250 bid for mood stabilization  -  Zyprexa 10mg QHS for psychosis, consider titrating as patient was previously on 15 mg   - methotrexate and hydroxychlorquine   - Supplement meals with Ensure  - PT evaluation and treatment due to marked difficulty in ambulating requiring assistance      10/28: Pt is "angry" at her daughter for "tricking" her into admission. Concurrently, pt claims to be happy about her admittance as she is hopeful she will receive the recourses she feels are necessary for her self-care. She admits to poor eating due to her malodorous home. She admits to prior AVH in the setting of visual hallucinations of zombies, "naked people, bugs and AH of people cussing and people in her wall at home "shooting up" with last presentation 2 days prior to evaluation. She states she is sleeping better since her arrival. She denies current SI/HI/AVH/PI.   MMSE: 23 - Mild Cognitive Impairment.   Ordered repeat UA and Cx after viewing abnormal results on 10/24  10/29: Pt is "happy" to be here and get the care she needs. She continues to have circumstantial thought but appears to be aware of this. She admits to eating and sleeping well stating she is eating and sleeping the best she has in some time. She admits to having an issue with a pt but states she is happy to move on from it. She denies SI/HI/AVH or PI.   10/30: Pt is "excited" to learn of new roommate which she hopes to byrd with in an attempt to feel better about missing her granddaughter. She reports having issues with continence and requests adult diapers as she is unable to make it to the bathroom in time. She described an event of incontinence occurring last night. After conversation about medication, pt agrees to comply, despite her reluctance due to belief her psychiatric medication is "poison." She is sleeping well (4 hours), and eating well, though she is concerned she is eating too much. Pt denies SI/HI/AVH/PI.   Urine cup provided for pt to obtain repeat UA and Cx  10/31: Pt is confused, stating she needed to get vitals done when she already had them done this AM. She is irritable when discussing medications and refuses to speak about it with writer, ending the conversation early, due to frustration.   Urine lab results reviewed and WNL.   : She continues to be labile alternating from kind and cheerful to frustrated rapidly. It appears her mood switches are based off the topic discussed. She has tangential speech and guided the conversation on her own. She admits to smoking since the age of 14 years old, stating she currently smokes 1 pack a week, as a vice when stressed and enjoys accompanying the cigarette with a beer. She speaks of her relationships with family and positive relationships with other patient's on floor. When informed the writer would like to ask just a few more questions and to discuss medications, the patient became irritated and demanded the writer leave, using profanity as the patient exited. Patient was seen on milieu dancing to the sound of the phone, stating it was for exercise. She was also seen by staff walking around with a towel on her head. Added Olanzapine 2.5 mg to be dispensed in the AM. Patient denied the medication, as well as all other medications except for her thiamin and multivitamin. She also refused her lab work.   : She continues to be labile alternating from kind and cheerful to frustrated rapidly. She continues to have tangential thought, bringing up topics on her own that frustrate her before blaming writer and using profanity until writer leaves. She often repeats words after saying them and says random words after her sentence is finished. She continues to refuse medication at times, stating she does not want poison in her body. She requests prednisone and risperidone, and gets frustrated when learning prednisone cannot be provided. When asked about taking her Depakote regularly, patient states in the past it caused her to see double and hallucinate, stating she saw "space monsters." Pt denies current AH and VH. Patient gets frustrated with writer, and asks her to leave using profanity as she exits. Before writer could speak, Ms. Trinidad opened the door and requested that writer leave, stating she will be suing the hospital so that she can leave. When asked if she is referring to a 72 hour letter, Ms. Trinidad ignores writer and continues to use profanity requesting writer leave.   Will fill out treatment over objection paper work.   : Patient continues to have tangential speech. She reports having a "good" mood and sleeping "very well" stating she slept for 4 hrs hours last night. Patient requests that she be discharged home but understands the 72 hour letter placed yesterday can take up to 3 days to be approved. Pt denies SI/HI/AVH or PI.  : Pt continues to have circumstantial speech. She is agitated that she has not been discharged and expresses frustration with the daily routine while here. She refuses to speak with staff further. Not compliant with medications and remains selective of the one's she takes.   Continuing with TOO. In regards to 72 hour letter, retention was filed.   ;;: rather pleasant with the writer; smiling ; Sleep  appetite ok; no pain issues. Not endorsing  suicidal or homicidal ideation intent or plans; no mention of auditory or visual hallucinations anxious;  somewhat internally preoccupied.  "Will you come back and talk to me?"  Yet irritable with other staff on the DASA scale. Current medications aluminum hydroxide/magnesium hydroxide/simethicone Suspension 30 milliLiter(s) Oral every 6 hours PRN  cholecalciferol 600 Unit(s) Oral daily  divalproex  milliGRAM(s) Oral two times a day for mood   folic acid 1 milliGRAM(s) Oral daily  hydroxychloroquine 200 milliGRAM(s) Oral two times a day  ibuprofen  Tablet. 200 milliGRAM(s) Oral three times a day PRN  methotrexate 7.5 milliGRAM(s) Oral <User Schedule>  multivitamin 1 Tablet(s) Oral daily  OLANZapine 5 milliGRAM(s) Oral every 8 hours PRN  OLANZapine 10 milliGRAM(s) Oral at bedtime for mood  thiamine 100 milliGRAM(s) Oral daily  ;;: in the vestibule of the special care area with her walker calm and polite;  informed more directible by staff;  Not endorsing  suicidal or homicidal ideation intent or plans; no mention of auditory or visual hallucinations wants her cell phone so she can get phone numbers.  slightly anxious.  Sleep  appetite ok; no pain issues.   Problems as per EMR: (Admit Diagnosis) RECURRENT FALLS ;(Admit Diagnosis) PSY ;(PMH) Rheumatoid arthritis ;(PMH) Anemia ;(PMH) Lupus ;(PMH) Bipolar affective ;(Problem/DX) Severe protein-calorie malnutrition ;(Problem/DX) Systemic lupus ;(Problem/DX) Rheumatoid arthritis ;(Problem/DX) Alcohol abuse ;(PSH) H/O:  section ;(R/O Problem/DX) Neurocognitive deficits ;(R/O Problem/DX) Schizoaffective disorder ;  At this time no acute issues on current medication regime.    : Labile, elevated, observed dancing in the milieu, poor insight and judgment, med non-compliant, sleeping poorly   Patient noted to have euthymic mood, did take zyprexa last night, no irritability or lability today. No si/hi/avh or PI. Anticipating going to court for retention today   Patient remains labile with fluctuating moods of irritability and elevated euphoria. Refused all psychiatric medications last night and this morning. No reported si/hi/avh or PI. Patient is awaiting court today for retention.    Patient refusing all medications since losing in court last week (retention), loud, irritable, cursing/foul language, frequently dancing and gyrating in the halls and in the chair  ;;: making strange gestures and making vulgar statements next to the nursing station;  was not able to directly participate in review of hearing for treatment over objection because of behavioral dyscontrol but aware of the proceedings.  Calmed after the proceedings sitting with other patients stating to the writer "He said 'suck my yao!')  however no staff witnessed any such event.  otherwise internally preoccupied ; continues refusing medicaitons that were taken in the past.  awaiting court hearing.  Review was with the [facility]  and Dr. Ruperto Garcia consultant.

## 2024-11-19 NOTE — BH INPATIENT PSYCHIATRY PROGRESS NOTE - NSBHCHARTREVIEWVS_PSY_A_CORE FT
Vital Signs Last 24 Hrs  T(C): 36.9 (11-19-24 @ 16:53), Max: 36.9 (11-19-24 @ 16:53)  T(F): 98.5 (11-19-24 @ 16:53), Max: 98.5 (11-19-24 @ 16:53)  HR: 89 (11-19-24 @ 16:53) (82 - 89)  BP: 125/77 (11-19-24 @ 16:53) (122/73 - 125/77)  BP(mean): --  RR: 18 (11-19-24 @ 08:00) (18 - 18)  SpO2: 99% (11-19-24 @ 16:53) (97% - 99%)

## 2024-11-19 NOTE — BH INPATIENT PSYCHIATRY PROGRESS NOTE - NSBHFUPINTERVALHXFT_PSY_A_CORE
making strange gestures and making vulgar statements next to the nursing station;  was not able to directly participate in review of hearing for treatment over objection because of behavioral dyscontrol but aware of the proceedings.  Calmed after the proceedings sitting with other patients stating to the writer "He said 'suck my yao!')  however no staff witnessed any such event.  otherwise internally preoccupied ; continues refusing medicaitons that were taken in the past.   awaiting court hearing.  Review was with the [facility]  and Dr. Ruperto Garcia consultant.

## 2024-11-19 NOTE — BH INPATIENT PSYCHIATRY PROGRESS NOTE - NSBHMETABOLIC_PSY_ALL_CORE_FT
BMI: BMI (kg/m2): 18.5 (11-05-24 @ 08:51)  HbA1c: A1C with Estimated Average Glucose Result: 5.7 % (10-26-24 @ 22:00)    Glucose:   BP: 125/77 (11-19-24 @ 16:53) (109/72 - 144/84)Vital Signs Last 24 Hrs  T(C): 36.9 (11-19-24 @ 16:53), Max: 36.9 (11-19-24 @ 16:53)  T(F): 98.5 (11-19-24 @ 16:53), Max: 98.5 (11-19-24 @ 16:53)  HR: 89 (11-19-24 @ 16:53) (82 - 89)  BP: 125/77 (11-19-24 @ 16:53) (122/73 - 125/77)  BP(mean): --  RR: 18 (11-19-24 @ 08:00) (18 - 18)  SpO2: 99% (11-19-24 @ 16:53) (97% - 99%)      Lipid Panel: Date/Time: 10-26-24 @ 22:00  Cholesterol, Serum: 158  LDL Cholesterol Calculated: 85  HDL Cholesterol, Serum: 62  Total Cholesterol/HDL Ration Measurement: --  Triglycerides, Serum: 54

## 2024-11-20 PROCEDURE — 99232 SBSQ HOSP IP/OBS MODERATE 35: CPT

## 2024-11-20 RX ADMIN — Medication 30 MILLILITER(S): at 03:23

## 2024-11-20 NOTE — BH INPATIENT PSYCHIATRY PROGRESS NOTE - NSBHMETABOLIC_PSY_ALL_CORE_FT
BMI: BMI (kg/m2): 18.5 (11-05-24 @ 08:51)  HbA1c: A1C with Estimated Average Glucose Result: 5.7 % (10-26-24 @ 22:00)    Glucose:   BP: 118/68 (11-20-24 @ 09:18) (109/72 - 144/84)Vital Signs Last 24 Hrs  T(C): 36.6 (11-20-24 @ 09:18), Max: 36.9 (11-19-24 @ 16:53)  T(F): 97.9 (11-20-24 @ 09:18), Max: 98.5 (11-19-24 @ 16:53)  HR: 71 (11-20-24 @ 09:18) (71 - 89)  BP: 118/68 (11-20-24 @ 09:18) (118/68 - 125/77)  BP(mean): --  RR: --  SpO2: 97% (11-20-24 @ 09:18) (97% - 99%)      Lipid Panel: Date/Time: 10-26-24 @ 22:00  Cholesterol, Serum: 158  LDL Cholesterol Calculated: 85  HDL Cholesterol, Serum: 62  Total Cholesterol/HDL Ration Measurement: --  Triglycerides, Serum: 54

## 2024-11-20 NOTE — BH INPATIENT PSYCHIATRY PROGRESS NOTE - NSBHCHARTREVIEWVS_PSY_A_CORE FT
Vital Signs Last 24 Hrs  T(C): 36.6 (11-20-24 @ 09:18), Max: 36.9 (11-19-24 @ 16:53)  T(F): 97.9 (11-20-24 @ 09:18), Max: 98.5 (11-19-24 @ 16:53)  HR: 71 (11-20-24 @ 09:18) (71 - 89)  BP: 118/68 (11-20-24 @ 09:18) (118/68 - 125/77)  BP(mean): --  RR: --  SpO2: 97% (11-20-24 @ 09:18) (97% - 99%)

## 2024-11-20 NOTE — BH INPATIENT PSYCHIATRY PROGRESS NOTE - NSBHFUPINTERVALHXFT_PSY_A_CORE
Patient visible on the unit, seen muttering to self and dancing with the walker in the milieu. She alternates with gyrating on the chair with legs on top of the walker or hands on the floor in front of her, bobbing up and down on the chair. On approach she glared at writer and walked away. Of note, she had hospital understand on her head.   MAR reviewed, pt remains med non-compliant.  Writer did speak with pt's daughter to provide treatment update. She is advocating for ACT team referral for her mother.

## 2024-11-20 NOTE — BH CHART NOTE - NSEVENTNOTEFT_PSY_ALL_CORE
Treatment update provided to daughter Abril Trinidad [phone]. She is aware of plan for TOO on Friday

## 2024-11-20 NOTE — BH INPATIENT PSYCHIATRY PROGRESS NOTE - NSBHASSESSSUMMFT_PSY_ALL_CORE
Patient is a 69 year old AA female, single with adult daughter, domiciled alone previously with [facility] services, with PPH of bipolar disorder and depression per daughter and PMH of rheumatoid arthritis and lupus, who was referred by APS due to concern for poor self-care and was admitted to the medicine service for failure to thrive. Psychiatry was consulted for a psychiatric evaluation for bipolar disorder and patient was transferred to [facility] under involuntary status due to poor self-care, mood lability, and psychosis (bizarre delusion of being poisoned by [employer]). On evaluation, patient is significantly calmer and more cooperative today than yesterday; however, patient appears religiously preoccupied and hyperverbal today. Patient briefly appeared internally preoccupied and appeared to be speaking about being poisoned with medications with an unknown entity in the room as her gaze was focused on a specific area; however, she immediately minimized it. She presents very differently from yesterday though per daughter, patient may be attempting to feign normalcy to expedite discharge. Per collateral from daughter and APS worker supervisor, patient has demonstrated poor self-care that has deteriorated significantly in the last several months resulting in weight loss, disorganized behavior, an untenable living situation, and poor hygiene. Patient has been very irritable and angry for several years, and has a history of good medication effect when hospitalized but poor compliance in the community. Per daughter, patient has also reported auditory hallucinations recently, but has not done so previously. Patient's current presentation may be secondary to decompensation in bipolar disorder as patient's symptoms are slightly manic (hyperverbal, mood lability, Yazidi preoccupation) and psychotic (auditory hallucinations, paranoia). However, given longstanding history of poor self-care and marked change in behavior a decade ago, schizoaffective disorder is more likely at this time. Patient requires inpatient psychiatric admission as she is unable to care for herself in the setting of psychosis, poor insight and judgment, and medication non-compliance.    - Continue Depakote 250 bid for mood stabilization  -  Zyprexa 10mg QHS for psychosis, consider titrating as patient was previously on 15 mg   - methotrexate and hydroxychlorquine   - Supplement meals with Ensure  - PT evaluation and treatment due to marked difficulty in ambulating requiring assistance      10/28: Pt is "angry" at her daughter for "tricking" her into admission. Concurrently, pt claims to be happy about her admittance as she is hopeful she will receive the recourses she feels are necessary for her self-care. She admits to poor eating due to her malodorous home. She admits to prior AVH in the setting of visual hallucinations of zombies, "naked people, bugs and AH of people cussing and people in her wall at home "shooting up" with last presentation 2 days prior to evaluation. She states she is sleeping better since her arrival. She denies current SI/HI/AVH/PI.   MMSE: 23 - Mild Cognitive Impairment.   Ordered repeat UA and Cx after viewing abnormal results on 10/24  10/29: Pt is "happy" to be here and get the care she needs. She continues to have circumstantial thought but appears to be aware of this. She admits to eating and sleeping well stating she is eating and sleeping the best she has in some time. She admits to having an issue with a pt but states she is happy to move on from it. She denies SI/HI/AVH or PI.   10/30: Pt is "excited" to learn of new roommate which she hopes to byrd with in an attempt to feel better about missing her granddaughter. She reports having issues with continence and requests adult diapers as she is unable to make it to the bathroom in time. She described an event of incontinence occurring last night. After conversation about medication, pt agrees to comply, despite her reluctance due to belief her psychiatric medication is "poison." She is sleeping well (4 hours), and eating well, though she is concerned she is eating too much. Pt denies SI/HI/AVH/PI.   Urine cup provided for pt to obtain repeat UA and Cx  10/31: Pt is confused, stating she needed to get vitals done when she already had them done this AM. She is irritable when discussing medications and refuses to speak about it with writer, ending the conversation early, due to frustration.   Urine lab results reviewed and WNL.   : She continues to be labile alternating from kind and cheerful to frustrated rapidly. It appears her mood switches are based off the topic discussed. She has tangential speech and guided the conversation on her own. She admits to smoking since the age of 14 years old, stating she currently smokes 1 pack a week, as a vice when stressed and enjoys accompanying the cigarette with a beer. She speaks of her relationships with family and positive relationships with other patient's on floor. When informed the writer would like to ask just a few more questions and to discuss medications, the patient became irritated and demanded the writer leave, using profanity as the patient exited. Patient was seen on milieu dancing to the sound of the phone, stating it was for exercise. She was also seen by staff walking around with a towel on her head. Added Olanzapine 2.5 mg to be dispensed in the AM. Patient denied the medication, as well as all other medications except for her thiamin and multivitamin. She also refused her lab work.   : She continues to be labile alternating from kind and cheerful to frustrated rapidly. She continues to have tangential thought, bringing up topics on her own that frustrate her before blaming writer and using profanity until writer leaves. She often repeats words after saying them and says random words after her sentence is finished. She continues to refuse medication at times, stating she does not want poison in her body. She requests prednisone and risperidone, and gets frustrated when learning prednisone cannot be provided. When asked about taking her Depakote regularly, patient states in the past it caused her to see double and hallucinate, stating she saw "space monsters." Pt denies current AH and VH. Patient gets frustrated with writer, and asks her to leave using profanity as she exits. Before writer could speak, Ms. Trinidad opened the door and requested that writer leave, stating she will be suing the hospital so that she can leave. When asked if she is referring to a 72 hour letter, Ms. Trinidad ignores writer and continues to use profanity requesting writer leave.   Will fill out treatment over objection paper work.   : Patient continues to have tangential speech. She reports having a "good" mood and sleeping "very well" stating she slept for 4 hrs hours last night. Patient requests that she be discharged home but understands the 72 hour letter placed yesterday can take up to 3 days to be approved. Pt denies SI/HI/AVH or PI.  : Pt continues to have circumstantial speech. She is agitated that she has not been discharged and expresses frustration with the daily routine while here. She refuses to speak with staff further. Not compliant with medications and remains selective of the one's she takes.   Continuing with TOO. In regards to 72 hour letter, retention was filed.   ;;: rather pleasant with the writer; smiling ; Sleep  appetite ok; no pain issues. Not endorsing  suicidal or homicidal ideation intent or plans; no mention of auditory or visual hallucinations anxious;  somewhat internally preoccupied.  "Will you come back and talk to me?"  Yet irritable with other staff on the DASA scale. Current medications aluminum hydroxide/magnesium hydroxide/simethicone Suspension 30 milliLiter(s) Oral every 6 hours PRN  cholecalciferol 600 Unit(s) Oral daily  divalproex  milliGRAM(s) Oral two times a day for mood   folic acid 1 milliGRAM(s) Oral daily  hydroxychloroquine 200 milliGRAM(s) Oral two times a day  ibuprofen  Tablet. 200 milliGRAM(s) Oral three times a day PRN  methotrexate 7.5 milliGRAM(s) Oral <User Schedule>  multivitamin 1 Tablet(s) Oral daily  OLANZapine 5 milliGRAM(s) Oral every 8 hours PRN  OLANZapine 10 milliGRAM(s) Oral at bedtime for mood  thiamine 100 milliGRAM(s) Oral daily  ;;: in the vestibule of the special care area with her walker calm and polite;  informed more directible by staff;  Not endorsing  suicidal or homicidal ideation intent or plans; no mention of auditory or visual hallucinations wants her cell phone so she can get phone numbers.  slightly anxious.  Sleep  appetite ok; no pain issues.   Problems as per EMR: (Admit Diagnosis) RECURRENT FALLS ;(Admit Diagnosis) PSY ;(PMH) Rheumatoid arthritis ;(PMH) Anemia ;(PMH) Lupus ;(PMH) Bipolar affective ;(Problem/DX) Severe protein-calorie malnutrition ;(Problem/DX) Systemic lupus ;(Problem/DX) Rheumatoid arthritis ;(Problem/DX) Alcohol abuse ;(PSH) H/O:  section ;(R/O Problem/DX) Neurocognitive deficits ;(R/O Problem/DX) Schizoaffective disorder ;  At this time no acute issues on current medication regime.    : Labile, elevated, observed dancing in the milieu, poor insight and judgment, med non-compliant, sleeping poorly   Patient noted to have euthymic mood, did take zyprexa last night, no irritability or lability today. No si/hi/avh or PI. Anticipating going to court for retention today   Patient remains labile with fluctuating moods of irritability and elevated euphoria. Refused all psychiatric medications last night and this morning. No reported si/hi/avh or PI. Patient is awaiting court today for retention.    Patient refusing all medications since losing in court last week (retention), loud, irritable, cursing/foul language, frequently dancing and gyrating in the halls and in the chair  ;;: making strange gestures and making vulgar statements next to the nursing station;  was not able to directly participate in review of hearing for treatment over objection because of behavioral dyscontrol but aware of the proceedings.  Calmed after the proceedings sitting with other patients stating to the writer "He said 'suck my yao!')  however no staff witnessed any such event.  otherwise internally preoccupied ; continues refusing medicaitons that were taken in the past.  awaiting court hearing.  Review was with [facility]  and Dr. Ruperto Garcia consultant.     Continues to refuse all medications, highly irritable and labile, continues to dance incessantly. Treatment update provided to daughter

## 2024-11-21 PROCEDURE — 99232 SBSQ HOSP IP/OBS MODERATE 35: CPT

## 2024-11-21 RX ORDER — POVIDONE, POLYVINYL ALCOHOL 20; 27 G/1000ML; G/1000ML
1 SOLUTION OPHTHALMIC
Refills: 0 | Status: DISCONTINUED | OUTPATIENT
Start: 2024-11-21 | End: 2024-11-29

## 2024-11-21 RX ADMIN — ACETAMINOPHEN 500MG 650 MILLIGRAM(S): 500 TABLET, COATED ORAL at 22:29

## 2024-11-21 RX ADMIN — ACETAMINOPHEN 500MG 650 MILLIGRAM(S): 500 TABLET, COATED ORAL at 23:29

## 2024-11-21 RX ADMIN — POVIDONE, POLYVINYL ALCOHOL 1 DROP(S): 20; 27 SOLUTION OPHTHALMIC at 19:21

## 2024-11-21 NOTE — BH INPATIENT PSYCHIATRY PROGRESS NOTE - NSBHMSEBEHAV_PSY_A_CORE
S:  This is a 70 year old female who presents with complaints of vaginal itching and irritation.  She reports she did initially also notice white vaginal discharge which was in her underwear.  She feels that symptoms have been present for 3-4 weeks now.  She was given a diflucan tablet by her primary care provider and does not feel that this was helpful.  She has tried over the counter Monistat, but was unable to insert the applicators.  She is currently on daily steroids and taking antibiotic three times a week for asthma.  She also has known diabetes type 2 which she is working to control blood sugars, they have been up and down since starting the steroids, most recent A1C was 6.9%.  She is not sexually active.  She denies any urinary complaints.      OBJECTIVE:  MA notes reviewed and agree.  Reviewed allergies, medical, surgical, family, obstetrical, and social hx.   Blood pressure 120/70, height 5' 4.5\" (1.638 m), weight 108.9 kg (240 lb).  General Appearance:  Well groomed.   Pelvic: Difficult exam due to patient with oxygen and decreased mobility    External genitalia:  Erythema and inflammation to bilateral labia and groin    Urethral Meatus:  Normal   Urethra:  No masses or tenderness   Bladder:  No cystocele   Vagina:  White, thick discharge present to introitus     Assessment:  Vaginal irritation   Vaginal itching     Plan:  > Reviewed exam findings with patient and spouse, consistent with vaginal candidiasis.  Suspect likely related to antibiotic and steroid use with asthma.     > Patient expresses concern as she was unable to insert vaginal applicators over the counter.    > Advised would send prescription for diflucan for 3 tablets, take 1 tablet every 72 hours.  Advised to not take cholesterol medication on day of taking diflucan tablet.  Message sent to PCP to notify them of this, if additional does are needed, may need repeat lab work.    > Reviewed use of Nystatin powder externally, advised to apply  2-3 times to day to vulva and groin.   > Continue follow up with internal medicine and speciality to work for adequate blood glucose control.   > Call or return to clinic as needed if these symptoms worsen, fail to improve as anticipated, or if new symptoms develop.   Hostile

## 2024-11-21 NOTE — BH INPATIENT PSYCHIATRY PROGRESS NOTE - NSBHMETABOLIC_PSY_ALL_CORE_FT
BMI: BMI (kg/m2): 18.5 (11-05-24 @ 08:51)  HbA1c: A1C with Estimated Average Glucose Result: 5.7 % (10-26-24 @ 22:00)    Glucose:   BP: 114/77 (11-21-24 @ 09:00) (114/77 - 125/77)Vital Signs Last 24 Hrs  T(C): 36.2 (11-21-24 @ 09:00), Max: 36.9 (11-20-24 @ 16:07)  T(F): 97.2 (11-21-24 @ 09:00), Max: 98.4 (11-20-24 @ 16:07)  HR: 90 (11-21-24 @ 09:00) (90 - 107)  BP: 114/77 (11-21-24 @ 09:00) (114/77 - 115/69)  BP(mean): --  RR: 18 (11-21-24 @ 09:00) (18 - 18)  SpO2: 100% (11-21-24 @ 09:00) (99% - 100%)      Lipid Panel: Date/Time: 10-26-24 @ 22:00  Cholesterol, Serum: 158  LDL Cholesterol Calculated: 85  HDL Cholesterol, Serum: 62  Total Cholesterol/HDL Ration Measurement: --  Triglycerides, Serum: 54

## 2024-11-21 NOTE — BH INPATIENT PSYCHIATRY PROGRESS NOTE - NSBHCHARTREVIEWVS_PSY_A_CORE FT
Vital Signs Last 24 Hrs  T(C): 36.2 (11-21-24 @ 09:00), Max: 36.9 (11-20-24 @ 16:07)  T(F): 97.2 (11-21-24 @ 09:00), Max: 98.4 (11-20-24 @ 16:07)  HR: 90 (11-21-24 @ 09:00) (90 - 107)  BP: 114/77 (11-21-24 @ 09:00) (114/77 - 115/69)  BP(mean): --  RR: 18 (11-21-24 @ 09:00) (18 - 18)  SpO2: 100% (11-21-24 @ 09:00) (99% - 100%)

## 2024-11-21 NOTE — BH INPATIENT PSYCHIATRY PROGRESS NOTE - CURRENT MEDICATION
MEDICATIONS  (STANDING):  artificial tears (preservative free) Ophthalmic Solution 1 Drop(s) Both EYES two times a day  cholecalciferol 600 Unit(s) Oral daily  divalproex  milliGRAM(s) Oral two times a day  folic acid 1 milliGRAM(s) Oral daily  hydroxychloroquine 200 milliGRAM(s) Oral two times a day  methotrexate 7.5 milliGRAM(s) Oral <User Schedule>  multivitamin 1 Tablet(s) Oral daily  OLANZapine 10 milliGRAM(s) Oral at bedtime  thiamine 100 milliGRAM(s) Oral daily    MEDICATIONS  (PRN):  acetaminophen     Tablet .. 650 milliGRAM(s) Oral every 6 hours PRN Moderate Pain (4 - 6)  aluminum hydroxide/magnesium hydroxide/simethicone Suspension 30 milliLiter(s) Oral every 6 hours PRN Dyspepsia  ibuprofen  Tablet. 200 milliGRAM(s) Oral three times a day PRN Moderate Pain (4 - 6)  OLANZapine 5 milliGRAM(s) Oral every 8 hours PRN agitation

## 2024-11-21 NOTE — BH INPATIENT PSYCHIATRY PROGRESS NOTE - NSBHFUPINTERVALHXFT_PSY_A_CORE
Patient visible on the unit, seen today in the milieu interacting with other pts. Is seen alongside Mayra. Remains labile, irritable at times. States that her granddaughter is on L&D preparing to give birth and states that she sometimes feels pains and abdominal discomfort that her granddaughter is also experiencing.  She states that she slept until 0400, then later states that she has not slept since her admission.  She is aware of court tomorrow for TOO.  Patient tangential and with flight of ideas.

## 2024-11-21 NOTE — BH INPATIENT PSYCHIATRY PROGRESS NOTE - NSBHASSESSSUMMFT_PSY_ALL_CORE
Patient is a 69 year old AA female, single with adult daughter, domiciled alone previously with [facility] services, with PPH of bipolar disorder and depression per daughter and PMH of rheumatoid arthritis and lupus, who was referred by APS due to concern for poor self-care and was admitted to the medicine service for failure to thrive. Psychiatry was consulted for a psychiatric evaluation for bipolar disorder and patient was transferred to [facility] under involuntary status due to poor self-care, mood lability, and psychosis (bizarre delusion of being poisoned by TONEY newton). On evaluation, patient is significantly calmer and more cooperative today than yesterday; however, patient appears religiously preoccupied and hyperverbal today. Patient briefly appeared internally preoccupied and appeared to be speaking about being poisoned with medications with an unknown entity in the room as her gaze was focused on a specific area; however, she immediately minimized it. She presents very differently from yesterday though per daughter, patient may be attempting to feign normalcy to expedite discharge. Per collateral from daughter and APS worker supervisor, patient has demonstrated poor self-care that has deteriorated significantly in the last several months resulting in weight loss, disorganized behavior, an untenable living situation, and poor hygiene. Patient has been very irritable and angry for several years, and has a history of good medication effect when hospitalized but poor compliance in the community. Per daughter, patient has also reported auditory hallucinations recently, but has not done so previously. Patient's current presentation may be secondary to decompensation in bipolar disorder as patient's symptoms are slightly manic (hyperverbal, mood lability, Zoroastrianism preoccupation) and psychotic (auditory hallucinations, paranoia). However, given longstanding history of poor self-care and marked change in behavior a decade ago, schizoaffective disorder is more likely at this time. Patient requires inpatient psychiatric admission as she is unable to care for herself in the setting of psychosis, poor insight and judgment, and medication non-compliance.    - Continue Depakote 250 bid for mood stabilization  -  Zyprexa 10mg QHS for psychosis, consider titrating as patient was previously on 15 mg   - methotrexate and hydroxychlorquine   - Supplement meals with Ensure  - PT evaluation and treatment due to marked difficulty in ambulating requiring assistance      10/28: Pt is "angry" at her daughter for "tricking" her into admission. Concurrently, pt claims to be happy about her admittance as she is hopeful she will receive the recourses she feels are necessary for her self-care. She admits to poor eating due to her malodorous home. She admits to prior AVH in the setting of visual hallucinations of zombies, "naked people, bugs and AH of people cussing and people in her wall at home "shooting up" with last presentation 2 days prior to evaluation. She states she is sleeping better since her arrival. She denies current SI/HI/AVH/PI.   MMSE: 23 - Mild Cognitive Impairment.   Ordered repeat UA and Cx after viewing abnormal results on 10/24  10/29: Pt is "happy" to be here and get the care she needs. She continues to have circumstantial thought but appears to be aware of this. She admits to eating and sleeping well stating she is eating and sleeping the best she has in some time. She admits to having an issue with a pt but states she is happy to move on from it. She denies SI/HI/AVH or PI.   10/30: Pt is "excited" to learn of new roommate which she hopes to byrd with in an attempt to feel better about missing her granddaughter. She reports having issues with continence and requests adult diapers as she is unable to make it to the bathroom in time. She described an event of incontinence occurring last night. After conversation about medication, pt agrees to comply, despite her reluctance due to belief her psychiatric medication is "poison." She is sleeping well (4 hours), and eating well, though she is concerned she is eating too much. Pt denies SI/HI/AVH/PI.   Urine cup provided for pt to obtain repeat UA and Cx  10/31: Pt is confused, stating she needed to get vitals done when she already had them done this AM. She is irritable when discussing medications and refuses to speak about it with writer, ending the conversation early, due to frustration.   Urine lab results reviewed and WNL.   : She continues to be labile alternating from kind and cheerful to frustrated rapidly. It appears her mood switches are based off the topic discussed. She has tangential speech and guided the conversation on her own. She admits to smoking since the age of 14 years old, stating she currently smokes 1 pack a week, as a vice when stressed and enjoys accompanying the cigarette with a beer. She speaks of her relationships with family and positive relationships with other patient's on floor. When informed the writer would like to ask just a few more questions and to discuss medications, the patient became irritated and demanded the writer leave, using profanity as the patient exited. Patient was seen on milieu dancing to the sound of the phone, stating it was for exercise. She was also seen by staff walking around with a towel on her head. Added Olanzapine 2.5 mg to be dispensed in the AM. Patient denied the medication, as well as all other medications except for her thiamin and multivitamin. She also refused her lab work.   : She continues to be labile alternating from kind and cheerful to frustrated rapidly. She continues to have tangential thought, bringing up topics on her own that frustrate her before blaming writer and using profanity until writer leaves. She often repeats words after saying them and says random words after her sentence is finished. She continues to refuse medication at times, stating she does not want poison in her body. She requests prednisone and risperidone, and gets frustrated when learning prednisone cannot be provided. When asked about taking her Depakote regularly, patient states in the past it caused her to see double and hallucinate, stating she saw "space monsters." Pt denies current AH and VH. Patient gets frustrated with writer, and asks her to leave using profanity as she exits. Before writer could speak, Ms. Trinidad opened the door and requested that writer leave, stating she will be suing the hospital so that she can leave. When asked if she is referring to a 72 hour letter, Ms. Trinidad ignores writer and continues to use profanity requesting writer leave.   Will fill out treatment over objection paper work.   : Patient continues to have tangential speech. She reports having a "good" mood and sleeping "very well" stating she slept for 4 hrs hours last night. Patient requests that she be discharged home but understands the 72 hour letter placed yesterday can take up to 3 days to be approved. Pt denies SI/HI/AVH or PI.  : Pt continues to have circumstantial speech. She is agitated that she has not been discharged and expresses frustration with the daily routine while here. She refuses to speak with staff further. Not compliant with medications and remains selective of the one's she takes.   Continuing with TOO. In regards to 72 hour letter, retention was filed.   ;;: rather pleasant with the writer; smiling ; Sleep  appetite ok; no pain issues. Not endorsing  suicidal or homicidal ideation intent or plans; no mention of auditory or visual hallucinations anxious;  somewhat internally preoccupied.  "Will you come back and talk to me?"  Yet irritable with other staff on the DASA scale. Current medications aluminum hydroxide/magnesium hydroxide/simethicone Suspension 30 milliLiter(s) Oral every 6 hours PRN  cholecalciferol 600 Unit(s) Oral daily  divalproex  milliGRAM(s) Oral two times a day for mood   folic acid 1 milliGRAM(s) Oral daily  hydroxychloroquine 200 milliGRAM(s) Oral two times a day  ibuprofen  Tablet. 200 milliGRAM(s) Oral three times a day PRN  methotrexate 7.5 milliGRAM(s) Oral <User Schedule>  multivitamin 1 Tablet(s) Oral daily  OLANZapine 5 milliGRAM(s) Oral every 8 hours PRN  OLANZapine 10 milliGRAM(s) Oral at bedtime for mood  thiamine 100 milliGRAM(s) Oral daily  ;;: in the vestibule of the special care area with her walker calm and polite;  informed more directible by staff;  Not endorsing  suicidal or homicidal ideation intent or plans; no mention of auditory or visual hallucinations wants her cell phone so she can get phone numbers.  slightly anxious.  Sleep  appetite ok; no pain issues.   Problems as per EMR: (Admit Diagnosis) RECURRENT FALLS ;(Admit Diagnosis) PSY ;(PMH) Rheumatoid arthritis ;(PMH) Anemia ;(PMH) Lupus ;(PMH) Bipolar affective ;(Problem/DX) Severe protein-calorie malnutrition ;(Problem/DX) Systemic lupus ;(Problem/DX) Rheumatoid arthritis ;(Problem/DX) Alcohol abuse ;(PSH) H/O:  section ;(R/O Problem/DX) Neurocognitive deficits ;(R/O Problem/DX) Schizoaffective disorder ;  At this time no acute issues on current medication regime.    : Labile, elevated, observed dancing in the milieu, poor insight and judgment, med non-compliant, sleeping poorly   Patient noted to have euthymic mood, did take zyprexa last night, no irritability or lability today. No si/hi/avh or PI. Anticipating going to court for retention today   Patient remains labile with fluctuating moods of irritability and elevated euphoria. Refused all psychiatric medications last night and this morning. No reported si/hi/avh or PI. Patient is awaiting court today for retention.    Patient refusing all medications since losing in court last week (retention), loud, irritable, cursing/foul language, frequently dancing and gyrating in the halls and in the chair  ;;: making strange gestures and making vulgar statements next to the nursing station;  was not able to directly participate in review of hearing for treatment over objection because of behavioral dyscontrol but aware of the proceedings.  Calmed after the proceedings sitting with other patients stating to the writer "He said 'suck my yao!')  however no staff witnessed any such event.  otherwise internally preoccupied ; continues refusing medicaitons that were taken in the past.  awaiting court hearing.  Review was with the [facility]  and Dr. Ruperto Garcia consultant.     Continues to refuse all medications, highly irritable and labile, continues to dance incessantly. Treatment update provided to daughter   Remains unchanged, is not taking medications, states that she is not sleeping. Is anticipating going to court for TOO tomorrow

## 2024-11-21 NOTE — BH TREATMENT PLAN - NSTXPSYCHOINTERMD_PSY_ALL_CORE
psychopharm x15 minutes, Zyprexa offered, but declined by patient so, pursue TOO if sufficiently nonadherent

## 2024-11-22 PROCEDURE — 99232 SBSQ HOSP IP/OBS MODERATE 35: CPT

## 2024-11-22 NOTE — BH INPATIENT PSYCHIATRY PROGRESS NOTE - NSBHASSESSSUMMFT_PSY_ALL_CORE
Patient is a 69 year old AA female, single with adult daughter, domiciled alone previously with [facility] services, with PPH of bipolar disorder and depression per daughter and PMH of rheumatoid arthritis and lupus, who was referred by APS due to concern for poor self-care and was admitted to the medicine service for failure to thrive. Psychiatry was consulted for a psychiatric evaluation for bipolar disorder and patient was transferred to [facility] under involuntary status due to poor self-care, mood lability, and psychosis (bizarre delusion of being poisoned by TONEY new[place]). On evaluation, patient is significantly calmer and more cooperative today than yesterday; however, patient appears religiously preoccupied and hyperverbal today. Patient briefly appeared internally preoccupied and appeared to be speaking about being poisoned with medications with an unknown entity in the room as her gaze was focused on a specific area; however, she immediately minimized it. She presents very differently from yesterday though per daughter, patient may be attempting to feign normalcy to expedite discharge. Per collateral from daughter and APS worker supervisor, patient has demonstrated poor self-care that has deteriorated significantly in the last several months resulting in weight loss, disorganized behavior, an untenable living situation, and poor hygiene. Patient has been very irritable and angry for several years, and has a history of good medication effect when hospitalized but poor compliance in the community. Per daughter, patient has also reported auditory hallucinations recently, but has not done so previously. Patient's current presentation may be secondary to decompensation in bipolar disorder as patient's symptoms are slightly manic (hyperverbal, mood lability, Druze preoccupation) and psychotic (auditory hallucinations, paranoia). However, given longstanding history of poor self-care and marked change in behavior a decade ago, schizoaffective disorder is more likely at this time. Patient requires inpatient psychiatric admission as she is unable to care for herself in the setting of psychosis, poor insight and judgment, and medication non-compliance.    - Continue Depakote 250 bid for mood stabilization  -  Zyprexa 10mg QHS for psychosis, consider titrating as patient was previously on 15 mg   - methotrexate and hydroxychlorquine   - Supplement meals with Ensure  - PT evaluation and treatment due to marked difficulty in ambulating requiring assistance      10/28: Pt is "angry" at her daughter for "tricking" her into admission. Concurrently, pt claims to be happy about her admittance as she is hopeful she will receive the recourses she feels are necessary for her self-care. She admits to poor eating due to her malodorous home. She admits to prior AVH in the setting of visual hallucinations of zombies, "naked people, bugs and AH of people cussing and people in her wall at home "shooting up" with last presentation 2 days prior to evaluation. She states she is sleeping better since her arrival. She denies current SI/HI/AVH/PI.   MMSE: 23 - Mild Cognitive Impairment.   Ordered repeat UA and Cx after viewing abnormal results on 10/24  10/29: Pt is "happy" to be here and get the care she needs. She continues to have circumstantial thought but appears to be aware of this. She admits to eating and sleeping well stating she is eating and sleeping the best she has in some time. She admits to having an issue with a pt but states she is happy to move on from it. She denies SI/HI/AVH or PI.   10/30: Pt is "excited" to learn of new roommate which she hopes to byrd with in an attempt to feel better about missing her granddaughter. She reports having issues with continence and requests adult diapers as she is unable to make it to the bathroom in time. She described an event of incontinence occurring last night. After conversation about medication, pt agrees to comply, despite her reluctance due to belief her psychiatric medication is "poison." She is sleeping well (4 hours), and eating well, though she is concerned she is eating too much. Pt denies SI/HI/AVH/PI.   Urine cup provided for pt to obtain repeat UA and Cx  10/31: Pt is confused, stating she needed to get vitals done when she already had them done this AM. She is irritable when discussing medications and refuses to speak about it with writer, ending the conversation early, due to frustration.   Urine lab results reviewed and WNL.   : She continues to be labile alternating from kind and cheerful to frustrated rapidly. It appears her mood switches are based off the topic discussed. She has tangential speech and guided the conversation on her own. She admits to smoking since the age of 14 years old, stating she currently smokes 1 pack a week, as a vice when stressed and enjoys accompanying the cigarette with a beer. She speaks of her relationships with family and positive relationships with other patient's on floor. When informed the writer would like to ask just a few more questions and to discuss medications, the patient became irritated and demanded the writer leave, using profanity as the patient exited. Patient was seen on milieu dancing to the sound of the phone, stating it was for exercise. She was also seen by staff walking around with a towel on her head. Added Olanzapine 2.5 mg to be dispensed in the AM. Patient denied the medication, as well as all other medications except for her thiamin and multivitamin. She also refused her lab work.   : She continues to be labile alternating from kind and cheerful to frustrated rapidly. She continues to have tangential thought, bringing up topics on her own that frustrate her before blaming writer and using profanity until writer leaves. She often repeats words after saying them and says random words after her sentence is finished. She continues to refuse medication at times, stating she does not want poison in her body. She requests prednisone and risperidone, and gets frustrated when learning prednisone cannot be provided. When asked about taking her Depakote regularly, patient states in the past it caused her to see double and hallucinate, stating she saw "space monsters." Pt denies current AH and VH. Patient gets frustrated with writer, and asks her to leave using profanity as she exits. Before writer could speak, Ms. Trinidad opened the door and requested that writer leave, stating she will be suing the hospital so that she can leave. When asked if she is referring to a 72 hour letter, Ms. Trinidad ignores writer and continues to use profanity requesting writer leave.   Will fill out treatment over objection paper work.   : Patient continues to have tangential speech. She reports having a "good" mood and sleeping "very well" stating she slept for 4 hrs hours last night. Patient requests that she be discharged home but understands the 72 hour letter placed yesterday can take up to 3 days to be approved. Pt denies SI/HI/AVH or PI.  : Pt continues to have circumstantial speech. She is agitated that she has not been discharged and expresses frustration with the daily routine while here. She refuses to speak with staff further. Not compliant with medications and remains selective of the one's she takes.   Continuing with TOO. In regards to 72 hour letter, retention was filed.   ;;: rather pleasant with the writer; smiling ; Sleep  appetite ok; no pain issues. Not endorsing  suicidal or homicidal ideation intent or plans; no mention of auditory or visual hallucinations anxious;  somewhat internally preoccupied.  "Will you come back and talk to me?"  Yet irritable with other staff on the DASA scale. Current medications aluminum hydroxide/magnesium hydroxide/simethicone Suspension 30 milliLiter(s) Oral every 6 hours PRN  cholecalciferol 600 Unit(s) Oral daily  divalproex  milliGRAM(s) Oral two times a day for mood   folic acid 1 milliGRAM(s) Oral daily  hydroxychloroquine 200 milliGRAM(s) Oral two times a day  ibuprofen  Tablet. 200 milliGRAM(s) Oral three times a day PRN  methotrexate 7.5 milliGRAM(s) Oral <User Schedule>  multivitamin 1 Tablet(s) Oral daily  OLANZapine 5 milliGRAM(s) Oral every 8 hours PRN  OLANZapine 10 milliGRAM(s) Oral at bedtime for mood  thiamine 100 milliGRAM(s) Oral daily  ;;: in the vestibule of the special care area with her walker calm and polite;  informed more directible by staff;  Not endorsing  suicidal or homicidal ideation intent or plans; no mention of auditory or visual hallucinations wants her cell phone so she can get phone numbers.  slightly anxious.  Sleep  appetite ok; no pain issues.   Problems as per EMR: (Admit Diagnosis) RECURRENT FALLS ;(Admit Diagnosis) PSY ;(PMH) Rheumatoid arthritis ;(PMH) Anemia ;(PMH) Lupus ;(PMH) Bipolar affective ;(Problem/DX) Severe protein-calorie malnutrition ;(Problem/DX) Systemic lupus ;(Problem/DX) Rheumatoid arthritis ;(Problem/DX) Alcohol abuse ;(PSH) H/O:  section ;(R/O Problem/DX) Neurocognitive deficits ;(R/O Problem/DX) Schizoaffective disorder ;  At this time no acute issues on current medication regime.    : Labile, elevated, observed dancing in the milieu, poor insight and judgment, med non-compliant, sleeping poorly   Patient noted to have euthymic mood, did take zyprexa last night, no irritability or lability today. No si/hi/avh or PI. Anticipating going to court for retention today   Patient remains labile with fluctuating moods of irritability and elevated euphoria. Refused all psychiatric medications last night and this morning. No reported si/hi/avh or PI. Patient is awaiting court today for retention.    Patient refusing all medications since losing in court last week (retention), loud, irritable, cursing/foul language, frequently dancing and gyrating in the halls and in the chair  ;;: making strange gestures and making vulgar statements next to the nursing station;  was not able to directly participate in review of hearing for treatment over objection because of behavioral dyscontrol but aware of the proceedings.  Calmed after the proceedings sitting with other patients stating to the writer "He said 'suck my yao!')  however no staff witnessed any such event.  otherwise internally preoccupied ; continues refusing medicaitons that were taken in the past.  awaiting court hearing.  Review was with the [facility]  and Dr. Ruperto Garcia consultant.     Continues to refuse all medications, highly irritable and labile, continues to dance incessantly. Treatment update provided to daughter   Remains unchanged, is not taking medications, states that she is not sleeping. Is anticipating going to court for TOO tomorrow   Remains unchanged. No court today, pt to be discharged per court order by next week Friday

## 2024-11-22 NOTE — BH INPATIENT PSYCHIATRY PROGRESS NOTE - NSBHMETABOLIC_PSY_ALL_CORE_FT
BMI: BMI (kg/m2): 18.5 (11-05-24 @ 08:51)  HbA1c: A1C with Estimated Average Glucose Result: 5.7 % (10-26-24 @ 22:00)    Glucose:   BP: 137/71 (11-22-24 @ 09:47) (114/77 - 147/83)Vital Signs Last 24 Hrs  T(C): 37.3 (11-22-24 @ 09:47), Max: 37.3 (11-22-24 @ 09:47)  T(F): 99.2 (11-22-24 @ 09:47), Max: 99.2 (11-22-24 @ 09:47)  HR: 95 (11-22-24 @ 09:47) (95 - 95)  BP: 137/71 (11-22-24 @ 09:47) (137/71 - 137/71)  BP(mean): --  RR: --  SpO2: 97% (11-22-24 @ 09:47) (97% - 97%)      Lipid Panel: Date/Time: 10-26-24 @ 22:00  Cholesterol, Serum: 158  LDL Cholesterol Calculated: 85  HDL Cholesterol, Serum: 62  Total Cholesterol/HDL Ration Measurement: --  Triglycerides, Serum: 54

## 2024-11-22 NOTE — BH INPATIENT PSYCHIATRY PROGRESS NOTE - NSBHFUPINTERVALHXFT_PSY_A_CORE
Patient remains highly irritable, unable to tolerate conversation with writer and SW. Cursing, rude, loud. Remains non-adherent with med regimen.

## 2024-11-22 NOTE — BH CHART NOTE - NSBHPTASSESSDT_PSY_A_CORE
14-Nov-2024 12:07
20-Nov-2024 13:48
22-Nov-2024 12:24
01-Nov-2024 11:10
06-Nov-2024 14:09
14-Nov-2024 12:01
29-Oct-2024 16:16
26-Oct-2024 20:05
30-Oct-2024 14:51

## 2024-11-22 NOTE — BH INPATIENT PSYCHIATRY PROGRESS NOTE - NSBHCHARTREVIEWVS_PSY_A_CORE FT
Vital Signs Last 24 Hrs  T(C): 37.3 (11-22-24 @ 09:47), Max: 37.3 (11-22-24 @ 09:47)  T(F): 99.2 (11-22-24 @ 09:47), Max: 99.2 (11-22-24 @ 09:47)  HR: 95 (11-22-24 @ 09:47) (95 - 95)  BP: 137/71 (11-22-24 @ 09:47) (137/71 - 137/71)  BP(mean): --  RR: --  SpO2: 97% (11-22-24 @ 09:47) (97% - 97%)

## 2024-11-22 NOTE — BH CHART NOTE - NSEVENTNOTEFT_PSY_ALL_CORE
Writer and SW spoke with pt's daughter to provide treatment update and inform of plan to discharge  by next week Friday

## 2024-11-25 PROCEDURE — 99231 SBSQ HOSP IP/OBS SF/LOW 25: CPT

## 2024-11-25 NOTE — BH INPATIENT PSYCHIATRY PROGRESS NOTE - NSBHMETABOLIC_PSY_ALL_CORE_FT
BMI: BMI (kg/m2): 18.5 (11-05-24 @ 08:51)  HbA1c: A1C with Estimated Average Glucose Result: 5.7 % (10-26-24 @ 22:00)    Glucose:   BP: 136/83 (11-25-24 @ 17:14) (92/64 - 136/83)Vital Signs Last 24 Hrs  T(C): 36.8 (11-25-24 @ 17:14), Max: 36.8 (11-25-24 @ 17:14)  T(F): 98.2 (11-25-24 @ 17:14), Max: 98.2 (11-25-24 @ 17:14)  HR: 107 (11-25-24 @ 17:14) (72 - 107)  BP: 136/83 (11-25-24 @ 17:14) (97/67 - 136/83)  BP(mean): --  RR: 18 (11-25-24 @ 17:14) (18 - 18)  SpO2: 99% (11-25-24 @ 17:14) (98% - 99%)      Lipid Panel: Date/Time: 10-26-24 @ 22:00  Cholesterol, Serum: 158  LDL Cholesterol Calculated: 85  HDL Cholesterol, Serum: 62  Total Cholesterol/HDL Ration Measurement: --  Triglycerides, Serum: 54

## 2024-11-25 NOTE — BH INPATIENT PSYCHIATRY PROGRESS NOTE - NSBHCHARTREVIEWVS_PSY_A_CORE FT
Vital Signs Last 24 Hrs  T(C): 36.8 (11-25-24 @ 17:14), Max: 36.8 (11-25-24 @ 17:14)  T(F): 98.2 (11-25-24 @ 17:14), Max: 98.2 (11-25-24 @ 17:14)  HR: 107 (11-25-24 @ 17:14) (72 - 107)  BP: 136/83 (11-25-24 @ 17:14) (97/67 - 136/83)  BP(mean): --  RR: 18 (11-25-24 @ 17:14) (18 - 18)  SpO2: 99% (11-25-24 @ 17:14) (98% - 99%)

## 2024-11-25 NOTE — BH INPATIENT PSYCHIATRY PROGRESS NOTE - NSBHFUPINTERVALHXFT_PSY_A_CORE
Patient remains highly irritable, unable to tolerate conversation with writer. Cursing, rude, loud. Remains non-adherent with med regimen. Walks away from writer upon approach.

## 2024-11-25 NOTE — BH INPATIENT PSYCHIATRY PROGRESS NOTE - NSBHASSESSSUMMFT_PSY_ALL_CORE
Patient is a 69 year old AA female, single with adult daughter, domiciled alone previously with [facility] services, with PPH of bipolar disorder and depression per daughter and PMH of rheumatoid arthritis and lupus, who was referred by APS due to concern for poor self-care and was admitted to the medicine service for failure to thrive. Psychiatry was consulted for a psychiatric evaluation for bipolar disorder and patient was transferred to [facility] under involuntary status due to poor self-care, mood lability, and psychosis (bizarre delusion of being poisoned by [employer]). On evaluation, patient is significantly calmer and more cooperative today than yesterday; however, patient appears religiously preoccupied and hyperverbal today. Patient briefly appeared internally preoccupied and appeared to be speaking about being poisoned with medications with an unknown entity in the room as her gaze was focused on a specific area; however, she immediately minimized it. She presents very differently from yesterday though per daughter, patient may be attempting to feign normalcy to expedite discharge. Per collateral from daughter and APS worker supervisor, patient has demonstrated poor self-care that has deteriorated significantly in the last several months resulting in weight loss, disorganized behavior, an untenable living situation, and poor hygiene. Patient has been very irritable and angry for several years, and has a history of good medication effect when hospitalized but poor compliance in the community. Per daughter, patient has also reported auditory hallucinations recently, but has not done so previously. Patient's current presentation may be secondary to decompensation in bipolar disorder as patient's symptoms are slightly manic (hyperverbal, mood lability, Buddhism preoccupation) and psychotic (auditory hallucinations, paranoia). However, given longstanding history of poor self-care and marked change in behavior a decade ago, schizoaffective disorder is more likely at this time. Patient requires inpatient psychiatric admission as she is unable to care for herself in the setting of psychosis, poor insight and judgment, and medication non-compliance.    - Continue Depakote 250 bid for mood stabilization  -  Zyprexa 10mg QHS for psychosis, consider titrating as patient was previously on 15 mg   - methotrexate and hydroxychlorquine   - Supplement meals with Ensure  - PT evaluation and treatment due to marked difficulty in ambulating requiring assistance      10/28: Pt is "angry" at her daughter for "tricking" her into admission. Concurrently, pt claims to be happy about her admittance as she is hopeful she will receive the recourses she feels are necessary for her self-care. She admits to poor eating due to her malodorous home. She admits to prior AVH in the setting of visual hallucinations of zombies, "naked people, bugs and AH of people cussing and people in her wall at home "shooting up" with last presentation 2 days prior to evaluation. She states she is sleeping better since her arrival. She denies current SI/HI/AVH/PI.   MMSE: 23 - Mild Cognitive Impairment.   Ordered repeat UA and Cx after viewing abnormal results on 10/24  10/29: Pt is "happy" to be here and get the care she needs. She continues to have circumstantial thought but appears to be aware of this. She admits to eating and sleeping well stating she is eating and sleeping the best she has in some time. She admits to having an issue with a pt but states she is happy to move on from it. She denies SI/HI/AVH or PI.   10/30: Pt is "excited" to learn of new roommate which she hopes to byrd with in an attempt to feel better about missing her granddaughter. She reports having issues with continence and requests adult diapers as she is unable to make it to the bathroom in time. She described an event of incontinence occurring last night. After conversation about medication, pt agrees to comply, despite her reluctance due to belief her psychiatric medication is "poison." She is sleeping well (4 hours), and eating well, though she is concerned she is eating too much. Pt denies SI/HI/AVH/PI.   Urine cup provided for pt to obtain repeat UA and Cx  10/31: Pt is confused, stating she needed to get vitals done when she already had them done this AM. She is irritable when discussing medications and refuses to speak about it with writer, ending the conversation early, due to frustration.   Urine lab results reviewed and WNL.   : She continues to be labile alternating from kind and cheerful to frustrated rapidly. It appears her mood switches are based off the topic discussed. She has tangential speech and guided the conversation on her own. She admits to smoking since the age of 14 years old, stating she currently smokes 1 pack a week, as a vice when stressed and enjoys accompanying the cigarette with a beer. She speaks of her relationships with family and positive relationships with other patient's on floor. When informed the writer would like to ask just a few more questions and to discuss medications, the patient became irritated and demanded the writer leave, using profanity as the patient exited. Patient was seen on milieu dancing to the sound of the phone, stating it was for exercise. She was also seen by staff walking around with a towel on her head. Added Olanzapine 2.5 mg to be dispensed in the AM. Patient denied the medication, as well as all other medications except for her thiamin and multivitamin. She also refused her lab work.   : She continues to be labile alternating from kind and cheerful to frustrated rapidly. She continues to have tangential thought, bringing up topics on her own that frustrate her before blaming writer and using profanity until writer leaves. She often repeats words after saying them and says random words after her sentence is finished. She continues to refuse medication at times, stating she does not want poison in her body. She requests prednisone and risperidone, and gets frustrated when learning prednisone cannot be provided. When asked about taking her Depakote regularly, patient states in the past it caused her to see double and hallucinate, stating she saw "space monsters." Pt denies current AH and VH. Patient gets frustrated with writer, and asks her to leave using profanity as she exits. Before writer could speak, Ms. Trinidad opened the door and requested that writer leave, stating she will be suing the hospital so that she can leave. When asked if she is referring to a 72 hour letter, Ms. Trinidad ignores writer and continues to use profanity requesting writer leave.   Will fill out treatment over objection paper work.   : Patient continues to have tangential speech. She reports having a "good" mood and sleeping "very well" stating she slept for 4 hrs hours last night. Patient requests that she be discharged home but understands the 72 hour letter placed yesterday can take up to 3 days to be approved. Pt denies SI/HI/AVH or PI.  : Pt continues to have circumstantial speech. She is agitated that she has not been discharged and expresses frustration with the daily routine while here. She refuses to speak with staff further. Not compliant with medications and remains selective of the one's she takes.   Continuing with TOO. In regards to 72 hour letter, retention was filed.   ;;: rather pleasant with the writer; smiling ; Sleep  appetite ok; no pain issues. Not endorsing  suicidal or homicidal ideation intent or plans; no mention of auditory or visual hallucinations anxious;  somewhat internally preoccupied.  "Will you come back and talk to me?"  Yet irritable with other staff on the DASA scale. Current medications aluminum hydroxide/magnesium hydroxide/simethicone Suspension 30 milliLiter(s) Oral every 6 hours PRN  cholecalciferol 600 Unit(s) Oral daily  divalproex  milliGRAM(s) Oral two times a day for mood   folic acid 1 milliGRAM(s) Oral daily  hydroxychloroquine 200 milliGRAM(s) Oral two times a day  ibuprofen  Tablet. 200 milliGRAM(s) Oral three times a day PRN  methotrexate 7.5 milliGRAM(s) Oral <User Schedule>  multivitamin 1 Tablet(s) Oral daily  OLANZapine 5 milliGRAM(s) Oral every 8 hours PRN  OLANZapine 10 milliGRAM(s) Oral at bedtime for mood  thiamine 100 milliGRAM(s) Oral daily  ;;: in the vestibule of the special care area with her walker calm and polite;  informed more directible by staff;  Not endorsing  suicidal or homicidal ideation intent or plans; no mention of auditory or visual hallucinations wants her cell phone so she can get phone numbers.  slightly anxious.  Sleep  appetite ok; no pain issues.   Problems as per EMR: (Admit Diagnosis) RECURRENT FALLS ;(Admit Diagnosis) PSY ;(PMH) Rheumatoid arthritis ;(PMH) Anemia ;(PMH) Lupus ;(PMH) Bipolar affective ;(Problem/DX) Severe protein-calorie malnutrition ;(Problem/DX) Systemic lupus ;(Problem/DX) Rheumatoid arthritis ;(Problem/DX) Alcohol abuse ;(PSH) H/O:  section ;(R/O Problem/DX) Neurocognitive deficits ;(R/O Problem/DX) Schizoaffective disorder ;  At this time no acute issues on current medication regime.    : Labile, elevated, observed dancing in the milieu, poor insight and judgment, med non-compliant, sleeping poorly   Patient noted to have euthymic mood, did take zyprexa last night, no irritability or lability today. No si/hi/avh or PI. Anticipating going to court for retention today   Patient remains labile with fluctuating moods of irritability and elevated euphoria. Refused all psychiatric medications last night and this morning. No reported si/hi/avh or PI. Patient is awaiting court today for retention.    Patient refusing all medications since losing in court last week (retention), loud, irritable, cursing/foul language, frequently dancing and gyrating in the halls and in the chair  ;;: making strange gestures and making vulgar statements next to the nursing station;  was not able to directly participate in review of hearing for treatment over objection because of behavioral dyscontrol but aware of the proceedings.  Calmed after the proceedings sitting with other patients stating to the writer "He said 'suck my yao!')  however no staff witnessed any such event.  otherwise internally preoccupied ; continues refusing medicaitons that were taken in the past.  awaiting court hearing.  Review was with [facility]  and Dr. Ruperto Garcia consultant.     Continues to refuse all medications, highly irritable and labile, continues to dance incessantly. Treatment update provided to daughter   Remains unchanged, is not taking medications, states that she is not sleeping. Is anticipating going to court for TOO tomorrow   Remains unchanged. No court today, pt to be discharged per court order by next week  Hostile towards treatment team, me non-adherent

## 2024-11-25 NOTE — CHART NOTE - NSCHARTNOTEFT_GEN_A_CORE
Admitting Diagnosis:   Patient is a 69y old  Female who presents with a chief complaint of RECURRENT FALLS    PSY     (28 Oct 2024 15:50)      PAST MEDICAL & SURGICAL HISTORY:  Bipolar affective  Lupus  Anemia  Rheumatoid arthritis  H/O:  section          Current Nutrition Order: Regular, Ensure Plus High Protein 3x/day (350kcal, 20g protein per serving)        PO Intake: Good (%) [ x ]  Fair (50-75%) [   ] Poor (<25%) [   ]    GI Issues: no report of nausea, vomiting, diarrhea, constipation     Pain: none reported     Skin Integrity: no pressure injuries or edema documented, Sahil score 21           Labs:   10-30    139  |  105  |  32[H]  ----------------------------<  106[H]  4.5   |  26  |  0.63    Ca    9.0      30 Oct 2024 17:23    TPro  7.1  /  Alb  3.5  /  TBili  <0.2  /  DBili  x   /  AST  25  /  ALT  14  /  AlkPhos  123[H]  10-30    CAPILLARY BLOOD GLUCOSE          Medications:  MEDICATIONS  (STANDING):  cholecalciferol 600 Unit(s) Oral daily  divalproex  milliGRAM(s) Oral two times a day  folic acid 1 milliGRAM(s) Oral daily  hydroxychloroquine 200 milliGRAM(s) Oral two times a day  methotrexate 7.5 milliGRAM(s) Oral every week  multivitamin 1 Tablet(s) Oral daily  OLANZapine 10 milliGRAM(s) Oral at bedtime  thiamine 100 milliGRAM(s) Oral daily    MEDICATIONS  (PRN):  aluminum hydroxide/magnesium hydroxide/simethicone Suspension 30 milliLiter(s) Oral every 6 hours PRN Dyspepsia  ibuprofen  Tablet. 200 milliGRAM(s) Oral three times a day PRN Moderate Pain (4 - 6)  LORazepam     Tablet 1 milliGRAM(s) Oral every 1 hour PRN CIWA-Ar score 8 or greater  OLANZapine 5 milliGRAM(s) Oral every 8 hours PRN agitation      Anthropometrics:  Dosing height: 62in  Dosing weight: 40.8kg/90 pounds  BMI 16.5   pounds, %IBW 82%    Weight Change:   43.1kg/95 pounds on 10/29     [Severe Protein Calorie Malnutrition: Risk Assessment Completed 10/28]  - PO intake: meeting <75% EER >1 month PTA  - Wt Loss: -15 pounds/14% x 4 months, clinically significant  - NFPE: notable for severe muscle and fat wasting to temples, clavicles, and buccals    Estimated energy needs:   Weight used for calculations	current weight  Estimated Energy Needs Weight (lbs)	90 lb  Estimated Energy Needs Weight (kg)	40.8 kg  Estimated Energy Needs From (margarita/kg)	25  Estimated Energy Needs To (margarita/kg)	30  Estimated Energy Needs Calculated From (margarita/kg)	1020  Estimated Energy Needs Calculated To (margarita/kg)	1224  Weight used for calculations	current weight  Estimated Protein Needs Weight (lbs)	90 lb  Estimated Protein Needs Weight (kg)	40.8 kg  Estimated Protein Needs From (g/kg)	1.2  Estimated Protein Needs To (g/kg)	1.6  Estimated Protein Needs Calculated From (g/kg)	48.96  Estimated Protein Needs Calculated To (g/kg)	65.28  Estimated Fluid Needs Weight (lbs)	90 lb  Estimated Fluid Needs Weight (kg)	40.8 kg  Estimated Fluid Needs From (ml/kg)	30  Estimated Fluid Needs To (ml/kg)	35  Estimated Fluid Needs Calculated From (ml/kg)	1224  Estimated Fluid Needs Calculated To (ml/kg)	1428  Other Calculations	 pounds, %IBW 82%  Pt within % IBW thus actual body weight used for all calculations. Needs adjusted for advanced age and malnutrition.    Subjective:   69 year old AA female, single with adult daughter, domiciled alone previously with [facility] services, with PPH of bipolar disorder and depression per daughter and PMH of rheumatoid arthritis and lupus, who was referred by APS due to concern for poor self-care and was admitted to the medicine service for failure to thrive. Psychiatry was consulted for a psychiatric evaluation for bipolar disorder and patient was transferred to [facility] under involuntary status due to poor self-care, mood lability, and psychosis (bizarre delusion of being poisoned by ghosts, ).    Pt seen for follow up, however pt appeared frustrated this morning with little interest in nutrition assessment. Pt endorses good appetite and intake; consumed oatmeal, 2 Ensure cans, and cereal with whole milk for breakfast this AM. Pt requesting for whole milk with breakfast every morning- documented in [facility]. No report of nausea, vomiting, diarrhea, constipation. Weight taken 10/29 at 95 pounds- suggests 5 pound weight gain since admit (desired). No new labs to assess. Medications reviewed- ordered for folate, vitamin B12, MVI, thiamin, methotrexate. RD to remain available for additional nutrition interventions as needed.     Previous Nutrition Diagnosis: Severe protein calorie malnutrition in context of chronic illness related to inadequate intake as evidenced by severe muscle and fat wasting, 14% weight loss    Active [ x ]  Resolved [   ]    Goal: Pt to meet at least 75% of nutritional needs consistently     Recommendations:  1. Continue Regular diet with Ensure Plus High Protein 3x/day (350kcal, 20g protein per serving)   2. Encourage and monitor PO intake, honor preferences as able    3. Monitor wt trends, GI function, skin integrity  4. Monitor lytes, renal indices, blood glucose, LFTs    5. Pain and bowel regimen per team     Education: Continued PO intake of meals and supplements     Risk Level: High [ x ] Moderate [   ] Low [   ]
Admitting Diagnosis:   Patient is a 69y old  Female who presents with a chief complaint of RECURRENT FALLS    PSY     (28 Oct 2024 15:50)      PAST MEDICAL & SURGICAL HISTORY:  Bipolar affective  Lupus  Anemia  Rheumatoid arthritis  H/O:  section          Current Nutrition Order: Regular, Ensure Plus High Protein 3x/day (350kcal, 20g protein per serving)        PO Intake: Good (%) [ x ]  Fair (50-75%) [   ] Poor (<25%) [   ]    GI Issues: no report of nausea, vomiting, diarrhea, constipation    Pain: none reported     Skin Integrity: no pressure injuries or edema documented; Sahil score 21          Labs: no new labs to assess         CAPILLARY BLOOD GLUCOSE          Medications:  MEDICATIONS  (STANDING):  cholecalciferol 600 Unit(s) Oral daily  divalproex  milliGRAM(s) Oral two times a day  folic acid 1 milliGRAM(s) Oral daily  hydroxychloroquine 200 milliGRAM(s) Oral two times a day  methotrexate 7.5 milliGRAM(s) Oral <User Schedule>  multivitamin 1 Tablet(s) Oral daily  OLANZapine 10 milliGRAM(s) Oral at bedtime  thiamine 100 milliGRAM(s) Oral daily    MEDICATIONS  (PRN):  acetaminophen     Tablet .. 650 milliGRAM(s) Oral every 6 hours PRN Moderate Pain (4 - 6)  aluminum hydroxide/magnesium hydroxide/simethicone Suspension 30 milliLiter(s) Oral every 6 hours PRN Dyspepsia  ibuprofen  Tablet. 200 milliGRAM(s) Oral three times a day PRN Moderate Pain (4 - 6)  OLANZapine 5 milliGRAM(s) Oral every 8 hours PRN agitation      Anthropometrics:  Dosing height: 62in  Dosing weight: 40.8kg/90 pounds  BMI 16.5   pounds, %IBW 82%    Weight Change:   43.1kg/95 pounds on 10/29   45.8kg/101 pounds on   47.6kg/105 pounds on     [Severe Protein Calorie Malnutrition: Risk Assessment Completed 10/28]  - PO intake: meeting <75% EER >1 month PTA  - Wt Loss: -15 pounds/14% x 4 months, clinically significant  - NFPE: notable for severe muscle and fat wasting to temples, clavicles, and buccals    Estimated energy needs:   Weight used for calculations	current weight  Estimated Energy Needs Weight (lbs)	90 lb  Estimated Energy Needs Weight (kg)	40.8 kg  Estimated Energy Needs From (margarita/kg)	25  Estimated Energy Needs To (margarita/kg)	30  Estimated Energy Needs Calculated From (margarita/kg)	1020  Estimated Energy Needs Calculated To (margarita/kg)	1224  Weight used for calculations	current weight  Estimated Protein Needs Weight (lbs)	90 lb  Estimated Protein Needs Weight (kg)	40.8 kg  Estimated Protein Needs From (g/kg)	1.2  Estimated Protein Needs To (g/kg)	1.6  Estimated Protein Needs Calculated From (g/kg)	48.96  Estimated Protein Needs Calculated To (g/kg)	65.28  Estimated Fluid Needs Weight (lbs)	90 lb  Estimated Fluid Needs Weight (kg)	40.8 kg  Estimated Fluid Needs From (ml/kg)	30  Estimated Fluid Needs To (ml/kg)	35  Estimated Fluid Needs Calculated From (ml/kg)	1224  Estimated Fluid Needs Calculated To (ml/kg)	1428  Other Calculations	 pounds, %IBW 82%  Pt within % IBW thus actual body weight used for all calculations. Needs adjusted for advanced age and malnutrition.    Subjective:   69 year old AA female, single with adult daughter, domiciled alone previously with [facility] services, with PPH of bipolar disorder and depression per daughter and PMH of rheumatoid arthritis and lupus, who was referred by APS due to concern for poor self-care and was admitted to the medicine service for failure to thrive. 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    Pt seen for follow up. Continues with great appetite and intake, consistently completing 100% of meals. Noted with 15 pound weight gain since admit (desired). No report of nausea, vomiting, diarrhea, constipation. No new labs to assess. Mediations reviewed; ordered for vitamin D, folate, MVI, thiamine. RD to remain available for additional nutrition interventions as needed.     Previous Nutrition Diagnosis: Severe protein calorie malnutrition in context of chronic illness related to inadequate intake as evidenced by severe muscle and fat wasting, 14% weight loss    Active [ x ]  Resolved [   ]    Goal: Pt to meet at least 75% of nutritional needs consistently     Recommendations:  1. Continue Regular diet with Ensure Plus High Protein 3x/day (350kcal, 20g protein per serving)   2. Encourage and monitor PO intake, honor preferences as able    3. Monitor wt trends, GI function, skin integrity  4. Monitor lytes, renal indices, blood glucose, LFTs    5. Pain and bowel regimen per team    Education: continued PO intake of meals and supplements     Risk Level: High [   ] Moderate [ x ] Low [   ]
Admitting Diagnosis:   Patient is a 69y old  Female who presents with a chief complaint of RECURRENT FALLS    PSY     (28 Oct 2024 15:50)      PAST MEDICAL & SURGICAL HISTORY:  Bipolar affective      Lupus      Anemia      Rheumatoid arthritis      H/O:  section          Current Nutrition Order: Regular, Ensure Plus High Protein 3x/day (350kcal, 20g protein per serving)        PO Intake: Good (%) [ x ]  Fair (50-75%) [   ] Poor (<25%) [   ]    GI Issues: no report of nausea, vomiting, diarrhea, constipation    Pain: none noted     Skin Integrity: no pressure injuries or edema documented, Sahil score 23          Labs: no new labs to assess         CAPILLARY BLOOD GLUCOSE          Medications:  MEDICATIONS  (STANDING):  artificial tears (preservative free) Ophthalmic Solution 1 Drop(s) Both EYES two times a day  cholecalciferol 600 Unit(s) Oral daily  divalproex  milliGRAM(s) Oral two times a day  folic acid 1 milliGRAM(s) Oral daily  hydroxychloroquine 200 milliGRAM(s) Oral two times a day  methotrexate 7.5 milliGRAM(s) Oral <User Schedule>  multivitamin 1 Tablet(s) Oral daily  OLANZapine 10 milliGRAM(s) Oral at bedtime  thiamine 100 milliGRAM(s) Oral daily    MEDICATIONS  (PRN):  acetaminophen     Tablet .. 650 milliGRAM(s) Oral every 6 hours PRN Moderate Pain (4 - 6)  aluminum hydroxide/magnesium hydroxide/simethicone Suspension 30 milliLiter(s) Oral every 6 hours PRN Dyspepsia  ibuprofen  Tablet. 200 milliGRAM(s) Oral three times a day PRN Moderate Pain (4 - 6)  OLANZapine 5 milliGRAM(s) Oral every 8 hours PRN agitation      Anthropometrics:  Dosing height: 62in  Dosing weight: 40.8kg/90 pounds  BMI 16.5   pounds, %IBW 82%    Weight Change:   43.1kg/95 pounds on 10/29   45.8kg/101 pounds on   47.6kg/105 pounds on   46.7kg/103 pounds on     [Severe Protein Calorie Malnutrition: Risk Assessment Completed 10/28]  - PO intake: meeting <75% EER >1 month PTA  - Wt Loss: -15 pounds/14% x 4 months, clinically significant  - NFPE: notable for severe muscle and fat wasting to temples, clavicles, and buccals    Estimated energy needs:   Weight used for calculations	current weight  Estimated Energy Needs Weight (lbs)	90 lb  Estimated Energy Needs Weight (kg)	40.8 kg  Estimated Energy Needs From (margarita/kg)	25  Estimated Energy Needs To (margarita/kg)	30  Estimated Energy Needs Calculated From (margarita/kg)	1020  Estimated Energy Needs Calculated To (margarita/kg)	1224  Weight used for calculations	current weight  Estimated Protein Needs Weight (lbs)	90 lb  Estimated Protein Needs Weight (kg)	40.8 kg  Estimated Protein Needs From (g/kg)	1.2  Estimated Protein Needs To (g/kg)	1.6  Estimated Protein Needs Calculated From (g/kg)	48.96  Estimated Protein Needs Calculated To (g/kg)	65.28  Estimated Fluid Needs Weight (lbs)	90 lb  Estimated Fluid Needs Weight (kg)	40.8 kg  Estimated Fluid Needs From (ml/kg)	30  Estimated Fluid Needs To (ml/kg)	35  Estimated Fluid Needs Calculated From (ml/kg)	1224  Estimated Fluid Needs Calculated To (ml/kg)	1428  Other Calculations	 pounds, %IBW 82%  Pt within % IBW thus actual body weight used for all calculations. Needs adjusted for advanced age and malnutrition.    Subjective:   69 year old AA female, single with adult daughter, domiciled alone previously with [facility] services, with PPH of bipolar disorder and depression per daughter and PMH of rheumatoid arthritis and lupus, who was referred by APS due to concern for poor self-care and was admitted to the medicine service for failure to thrive. 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    Pt seen for follow up. Continues on Regular diet with good appetite and intake, consistently consuming % of meals and supplements. Pt expresses no new food preferences at this time. No report of GI distress. Ordered for vitamin D, folate, MVI, thiamine. RD to remain available for additional nutrition interventions as needed.     Previous Nutrition Diagnosis: Severe protein calorie malnutrition in context of chronic illness related to inadequate intake as evidenced by severe muscle and fat wasting, 14% weight loss    Active [ x - resolving ]  Resolved [   ]    Goal: Pt to meet at least 75% of nutritional needs consistently     Recommendations:  1. Continue Regular diet with Ensure Plus High Protein 3x/day (350kcal, 20g protein per serving)   2. Encourage and monitor PO intake, honor preferences as able    3. Monitor wt trends, GI function, skin integrity  4. Monitor lytes, renal indices, blood glucose, LFTs    5. Pain and bowel regimen per team    Education: continued PO intake of meals and supplements as tolerated     Risk Level: High [   ] Moderate [ x ] Low [
Admitting Diagnosis:   Patient is a 69y old  Female who presents with a chief complaint of RECURRENT FALLS    PSY     (28 Oct 2024 15:50)      PAST MEDICAL & SURGICAL HISTORY:  Bipolar affective      Lupus      Anemia      Rheumatoid arthritis      H/O:  section          Current Nutrition Order: Regular, Ensure Plus High Protein 3x/day (350kcal, 20g protein per serving)        PO Intake: Good (%) [ x ]  Fair (50-75%) [   ] Poor (<25%) [   ]    GI Issues: no report of nausea, vomiting, diarrhea, constipation    Pain: none reported     Skin Integrity: no pressure injuries or edema documented; Sahil score 21          Labs: no new labs to assess         CAPILLARY BLOOD GLUCOSE          Medications:  MEDICATIONS  (STANDING):  cholecalciferol 600 Unit(s) Oral daily  divalproex  milliGRAM(s) Oral two times a day  folic acid 1 milliGRAM(s) Oral daily  hydroxychloroquine 200 milliGRAM(s) Oral two times a day  methotrexate 7.5 milliGRAM(s) Oral <User Schedule>  multivitamin 1 Tablet(s) Oral daily  OLANZapine 10 milliGRAM(s) Oral at bedtime  thiamine 100 milliGRAM(s) Oral daily    MEDICATIONS  (PRN):  aluminum hydroxide/magnesium hydroxide/simethicone Suspension 30 milliLiter(s) Oral every 6 hours PRN Dyspepsia  ibuprofen  Tablet. 200 milliGRAM(s) Oral three times a day PRN Moderate Pain (4 - 6)  OLANZapine 5 milliGRAM(s) Oral every 8 hours PRN agitation      Anthropometrics:  Dosing height: 62in  Dosing weight: 40.8kg/90 pounds  BMI 16.5   pounds, %IBW 82%    Weight Change:   43.1kg/95 pounds on 10/29   45.8kg/101 pounds on     [Severe Protein Calorie Malnutrition: Risk Assessment Completed 10/28]  - PO intake: meeting <75% EER >1 month PTA  - Wt Loss: -15 pounds/14% x 4 months, clinically significant  - NFPE: notable for severe muscle and fat wasting to temples, clavicles, and buccals    Estimated energy needs:   Weight used for calculations	current weight  Estimated Energy Needs Weight (lbs)	90 lb  Estimated Energy Needs Weight (kg)	40.8 kg  Estimated Energy Needs From (margarita/kg)	25  Estimated Energy Needs To (margarita/kg)	30  Estimated Energy Needs Calculated From (margarita/kg)	1020  Estimated Energy Needs Calculated To (margarita/kg)	1224  Weight used for calculations	current weight  Estimated Protein Needs Weight (lbs)	90 lb  Estimated Protein Needs Weight (kg)	40.8 kg  Estimated Protein Needs From (g/kg)	1.2  Estimated Protein Needs To (g/kg)	1.6  Estimated Protein Needs Calculated From (g/kg)	48.96  Estimated Protein Needs Calculated To (g/kg)	65.28  Estimated Fluid Needs Weight (lbs)	90 lb  Estimated Fluid Needs Weight (kg)	40.8 kg  Estimated Fluid Needs From (ml/kg)	30  Estimated Fluid Needs To (ml/kg)	35  Estimated Fluid Needs Calculated From (ml/kg)	1224  Estimated Fluid Needs Calculated To (ml/kg)	1428  Other Calculations	 pounds, %IBW 82%  Pt within % IBW thus actual body weight used for all calculations. Needs adjusted for advanced age and malnutrition.    Subjective:   69 year old AA female, single with adult daughter, domiciled alone previously with [facility] services, with PPH of bipolar disorder and depression per daughter and PMH of rheumatoid arthritis and lupus, who was referred by APS due to concern for poor self-care and was admitted to the medicine service for failure to thrive. 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    Pt seen for follow up. Continues on Regular diet with great appetite and intake. Consumed pancakes, sausage, milk, fruit, and one Ensure can for breakfast this AM. Continues to endorse % completion of meals in-house. No report of nausea, vomiting, diarrhea, constipation. No new labs or weights to assess. Mediations reviewed; ordered for vitamin D, folate, MVI, thiamine. RD to remain available for additional nutrition interventions as needed.     Previous Nutrition Diagnosis: Severe protein calorie malnutrition in context of chronic illness related to inadequate intake as evidenced by severe muscle and fat wasting, 14% weight loss    Active [ x ]  Resolved [   ]    Goal: Pt to meet at least 75% of nutritional needs consistently     Recommendations:  1. Continue Regular diet with Ensure Plus High Protein 3x/day (350kcal, 20g protein per serving)   2. Encourage and monitor PO intake, honor preferences as able    3. Monitor wt trends, GI function, skin integrity  4. Monitor lytes, renal indices, blood glucose, LFTs    5. Pain and bowel regimen per team    Education: continued PO intake of meals and supplements for weight gain     Risk Level: High [   ] Moderate [ x ] Low [   ]
Admitting Diagnosis:   Patient is a 69y old  Female who presents with a chief complaint of RECURRENT FALLS    PSY     (28 Oct 2024 15:50)      PAST MEDICAL & SURGICAL HISTORY:  Bipolar affective      Lupus      Anemia      Rheumatoid arthritis      H/O:  section          Current Nutrition Order: Regular, Ensure Plus High Protein 3x/day (350kcal, 20g protein per serving)       PO Intake: Good (%) [ x ]  Fair (50-75%) [   ] Poor (<25%) [   ]    GI Issues: no report of nausea, vomiting, diarrhea, constipation    Pain: none reported     Skin Integrity: no pressure injuries or edema documented, Sahil score 22           Labs : Electrolytes WNL, BUN 39<H>, glucose 80<WNL>        CAPILLARY BLOOD GLUCOSE        Medications:  MEDICATIONS  (STANDING):  cholecalciferol 600 Unit(s) Oral daily  divalproex  milliGRAM(s) Oral two times a day  folic acid 1 milliGRAM(s) Oral daily  hydroxychloroquine 200 milliGRAM(s) Oral two times a day  methotrexate 7.5 milliGRAM(s) Oral <User Schedule>  multivitamin 1 Tablet(s) Oral daily  OLANZapine 10 milliGRAM(s) Oral at bedtime  thiamine 100 milliGRAM(s) Oral daily    MEDICATIONS  (PRN):  aluminum hydroxide/magnesium hydroxide/simethicone Suspension 30 milliLiter(s) Oral every 6 hours PRN Dyspepsia  ibuprofen  Tablet. 200 milliGRAM(s) Oral three times a day PRN Moderate Pain (4 - 6)  OLANZapine 5 milliGRAM(s) Oral every 8 hours PRN agitation      Anthropometrics:  Dosing height: 62in  Dosing weight: 40.8kg/90 pounds  BMI 16.5   pounds, %IBW 82%    Weight Change:   43.1kg/95 pounds on 10/29   45.8kg/101 pounds on     [Severe Protein Calorie Malnutrition: Risk Assessment Completed 10/28]  - PO intake: meeting <75% EER >1 month PTA  - Wt Loss: -15 pounds/14% x 4 months, clinically significant  - NFPE: notable for severe muscle and fat wasting to temples, clavicles, and buccals    Estimated energy needs:   Weight used for calculations	current weight  Estimated Energy Needs Weight (lbs)	90 lb  Estimated Energy Needs Weight (kg)	40.8 kg  Estimated Energy Needs From (margarita/kg)	25  Estimated Energy Needs To (margarita/kg)	30  Estimated Energy Needs Calculated From (margarita/kg)	1020  Estimated Energy Needs Calculated To (margarita/kg)	1224  Weight used for calculations	current weight  Estimated Protein Needs Weight (lbs)	90 lb  Estimated Protein Needs Weight (kg)	40.8 kg  Estimated Protein Needs From (g/kg)	1.2  Estimated Protein Needs To (g/kg)	1.6  Estimated Protein Needs Calculated From (g/kg)	48.96  Estimated Protein Needs Calculated To (g/kg)	65.28  Estimated Fluid Needs Weight (lbs)	90 lb  Estimated Fluid Needs Weight (kg)	40.8 kg  Estimated Fluid Needs From (ml/kg)	30  Estimated Fluid Needs To (ml/kg)	35  Estimated Fluid Needs Calculated From (ml/kg)	1224  Estimated Fluid Needs Calculated To (ml/kg)	1428  Other Calculations	 pounds, %IBW 82%  Pt within % IBW thus actual body weight used for all calculations. Needs adjusted for advanced age and malnutrition.    Subjective:   69 year old AA female, single with adult daughter, domiciled alone previously with [facility] services, with PPH of bipolar disorder and depression per daughter and PMH of rheumatoid arthritis and lupus, who was referred by APS due to concern for poor self-care and was admitted to the medicine service for failure to thrive. 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    Pt seen for follow up. Continues on Regular diet with reported great appetite and intake. Pt consumed 100% of breakfast this AM which consisted of french toast, turkey valentin, 2x rice krispies with whole milk, coffee mixed with Ensure can. Pt reports consistently consuming % of meals. Weight taken  at 101 pounds- suggests continued weight gain since admit, with total weight gain of 11 pounds (desired). RD encouraged continued PO intake of meals and supplements and reviewed importance of adequate intake for weight gain. Pt verbalized understanding. Labs : Electrolytes WNL, BUN 39<H>, glucose 80<WNL>. Medications reviewed; ordered for vitamin D, folate, MVI, thiamine. RD to remain available for additional nutrition interventions as needed.     Previous Nutrition Diagnosis: Severe protein calorie malnutrition in context of chronic illness related to inadequate intake as evidenced by severe muscle and fat wasting, 14% weight loss    Active [ x ]  Resolved [   ]    Goal: Pt to meet at least 75% of nutritional needs consistently     Recommendations:  1. Continue Regular diet with Ensure Plus High Protein 3x/day (350kcal, 20g protein per serving)   2. Encourage and monitor PO intake, honor preferences as able    3. Monitor wt trends, GI function, skin integrity  4. Monitor lytes, renal indices, blood glucose, LFTs    5. Pain and bowel regimen per team    Education: Continued PO intake of meals and supplements; Importance of adequate intake for weight gain. Pt verbalized understanding.     Risk Level: High [   ] Moderate [ x ] Low [   ]

## 2024-11-26 PROCEDURE — 70450 CT HEAD/BRAIN W/O DYE: CPT | Mod: MC

## 2024-11-26 PROCEDURE — 97161 PT EVAL LOW COMPLEX 20 MIN: CPT

## 2024-11-26 PROCEDURE — 84100 ASSAY OF PHOSPHORUS: CPT

## 2024-11-26 PROCEDURE — 83735 ASSAY OF MAGNESIUM: CPT

## 2024-11-26 PROCEDURE — 87635 SARS-COV-2 COVID-19 AMP PRB: CPT

## 2024-11-26 PROCEDURE — 83036 HEMOGLOBIN GLYCOSYLATED A1C: CPT

## 2024-11-26 PROCEDURE — 93005 ELECTROCARDIOGRAM TRACING: CPT

## 2024-11-26 PROCEDURE — 99285 EMERGENCY DEPT VISIT HI MDM: CPT

## 2024-11-26 PROCEDURE — 36415 COLL VENOUS BLD VENIPUNCTURE: CPT

## 2024-11-26 PROCEDURE — 97165 OT EVAL LOW COMPLEX 30 MIN: CPT

## 2024-11-26 PROCEDURE — 80048 BASIC METABOLIC PNL TOTAL CA: CPT

## 2024-11-26 PROCEDURE — 82746 ASSAY OF FOLIC ACID SERUM: CPT

## 2024-11-26 PROCEDURE — 85652 RBC SED RATE AUTOMATED: CPT

## 2024-11-26 PROCEDURE — 82607 VITAMIN B-12: CPT

## 2024-11-26 PROCEDURE — 82306 VITAMIN D 25 HYDROXY: CPT

## 2024-11-26 PROCEDURE — 84443 ASSAY THYROID STIM HORMONE: CPT

## 2024-11-26 PROCEDURE — 81001 URINALYSIS AUTO W/SCOPE: CPT

## 2024-11-26 PROCEDURE — 86140 C-REACTIVE PROTEIN: CPT

## 2024-11-26 PROCEDURE — 85025 COMPLETE CBC W/AUTO DIFF WBC: CPT

## 2024-11-26 PROCEDURE — 80053 COMPREHEN METABOLIC PANEL: CPT

## 2024-11-26 RX ADMIN — Medication 600 UNIT(S): at 11:30

## 2024-11-26 RX ADMIN — POVIDONE, POLYVINYL ALCOHOL 1 DROP(S): 20; 27 SOLUTION OPHTHALMIC at 11:30

## 2024-11-26 RX ADMIN — Medication 1 TABLET(S): at 11:31

## 2024-11-27 PROCEDURE — 99232 SBSQ HOSP IP/OBS MODERATE 35: CPT

## 2024-11-27 NOTE — BH TREATMENT PLAN - NSCMSPTSTRENGTHS_PSY_ALL_CORE
Assertive/Supportive family
Assertive/Expressive of emotions/Supportive family
Supportive family
Assertive/Expressive of emotions/Supportive family
Assertive/Supportive family

## 2024-11-27 NOTE — BH INPATIENT PSYCHIATRY PROGRESS NOTE - NSCGISEVERILLNESS_PSY_ALL_CORE
6 = Severely ill - disruptive pathology, behavior and function are frequently influenced by symptoms, may require assistance from others
6 = Severely ill - disruptive pathology, behavior and function are frequently influenced by symptoms, may require assistance from others
5 = Markedly ill - intrusive symptoms that distinctly impair social/occupational function or cause intrusive levels of distress
5 = Markedly ill - intrusive symptoms that distinctly impair social/occupational function or cause intrusive levels of distress
6 = Severely ill - disruptive pathology, behavior and function are frequently influenced by symptoms, may require assistance from others
6 = Severely ill - disruptive pathology, behavior and function are frequently influenced by symptoms, may require assistance from others
5 = Markedly ill - intrusive symptoms that distinctly impair social/occupational function or cause intrusive levels of distress
6 = Severely ill - disruptive pathology, behavior and function are frequently influenced by symptoms, may require assistance from others
5 = Markedly ill - intrusive symptoms that distinctly impair social/occupational function or cause intrusive levels of distress
5 = Markedly ill - intrusive symptoms that distinctly impair social/occupational function or cause intrusive levels of distress
6 = Severely ill - disruptive pathology, behavior and function are frequently influenced by symptoms, may require assistance from others

## 2024-11-27 NOTE — BH INPATIENT PSYCHIATRY PROGRESS NOTE - NSTXPSYCHOGOAL_PSY_ALL_CORE
Other...
Will be able to report experiencing hallucinations to staff
Other...

## 2024-11-27 NOTE — BH TREATMENT PLAN - NSTXPATIENTPARTICIPATE_PSY_ALL_CORE
Patient participated in identification of needs/problems/goals for treatment/Patient participated in defining interventions/Patient participated in development of after care plan
Patient participated in development of after care plan
No, patient unwilling to participate
No, patient unwilling to participate
Patient participated in development of after care plan
No, patient unwilling to participate

## 2024-11-27 NOTE — BH TREATMENT PLAN - NSTXDCOPNOINTERMD_PSY_ALL_CORE
psychopharm x15 minutes. TOO. 
psychopharm x15 minutes
psychopharm x15 minutes. TOO. 
psychopharm x15 minutes. TOO. 
psychopharm x15 minutes

## 2024-11-27 NOTE — BH TREATMENT PLAN - NSTXPSYCHOGOALOTHER_PSY_ALL_CORE
Pt will participate in groups and milieu treatment structure of the unit
Pt. will participate in groups and milieu tx. structure of unit
Pt will participate in groups and milieu treatment structure of the unit
Pt. will participate in groups and milieu tx. structure of unit
Pt will participate in groups and milieu treatment structure of the unit
Pt will participate in groups and milieu treatment structure of the unit

## 2024-11-27 NOTE — BH INPATIENT PSYCHIATRY PROGRESS NOTE - NSBHASSESSSUMMFT_PSY_ALL_CORE
Patient is a 69 year old AA female, single with adult daughter, domiciled alone previously with [facility] services, with PPH of bipolar disorder and depression per daughter and PMH of rheumatoid arthritis and lupus, who was referred by APS due to concern for poor self-care and was admitted to the medicine service for failure to thrive. Psychiatry was consulted for a psychiatric evaluation for bipolar disorder and patient was transferred to [facility] under involuntary status due to poor self-care, mood lability, and psychosis (bizarre delusion of being poisoned by [employer]). On evaluation, patient is significantly calmer and more cooperative today than yesterday; however, patient appears religiously preoccupied and hyperverbal today. Patient briefly appeared internally preoccupied and appeared to be speaking about being poisoned with medications with an unknown entity in the room as her gaze was focused on a specific area; however, she immediately minimized it. She presents very differently from yesterday though per daughter, patient may be attempting to feign normalcy to expedite discharge. Per collateral from daughter and APS worker supervisor, patient has demonstrated poor self-care that has deteriorated significantly in the last several months resulting in weight loss, disorganized behavior, an untenable living situation, and poor hygiene. Patient has been very irritable and angry for several years, and has a history of good medication effect when hospitalized but poor compliance in the community. Per daughter, patient has also reported auditory hallucinations recently, but has not done so previously. Patient's current presentation may be secondary to decompensation in bipolar disorder as patient's symptoms are slightly manic (hyperverbal, mood lability, [employer]ism preoccupation) and psychotic (auditory hallucinations, paranoia). However, given longstanding history of poor self-care and marked change in behavior a decade ago, schizoaffective disorder is more likely at this time. Patient requires inpatient psychiatric admission as she is unable to care for herself in the setting of psychosis, poor insight and judgment, and medication non-compliance.    - Continue Depakote 250 bid for mood stabilization  -  Zyprexa 10mg QHS for psychosis, consider titrating as patient was previously on 15 mg   - methotrexate and hydroxychlorquine   - Supplement meals with Ensure  - PT evaluation and treatment due to marked difficulty in ambulating requiring assistance      10/28: Pt is "angry" at her daughter for "tricking" her into admission. Concurrently, pt claims to be happy about her admittance as she is hopeful she will receive the recourses she feels are necessary for her self-care. She admits to poor eating due to her malodorous home. She admits to prior AVH in the setting of visual hallucinations of zombies, "naked people, bugs and AH of people cussing and people in her wall at home "shooting up" with last presentation 2 days prior to evaluation. She states she is sleeping better since her arrival. She denies current SI/HI/AVH/PI.   MMSE: 23 - Mild Cognitive Impairment.   Ordered repeat UA and Cx after viewing abnormal results on 10/24  10/29: Pt is "happy" to be here and get the care she needs. She continues to have circumstantial thought but appears to be aware of this. She admits to eating and sleeping well stating she is eating and sleeping the best she has in some time. She admits to having an issue with a pt but states she is happy to move on from it. She denies SI/HI/AVH or PI.   10/30: Pt is "excited" to learn of new roommate which she hopes to byrd with in an attempt to feel better about missing her granddaughter. She reports having issues with continence and requests adult diapers as she is unable to make it to the bathroom in time. She described an event of incontinence occurring last night. After conversation about medication, pt agrees to comply, despite her reluctance due to belief her psychiatric medication is "poison." She is sleeping well (4 hours), and eating well, though she is concerned she is eating too much. Pt denies SI/HI/AVH/PI.   Urine cup provided for pt to obtain repeat UA and Cx  10/31: Pt is confused, stating she needed to get vitals done when she already had them done this AM. She is irritable when discussing medications and refuses to speak about it with writer, ending the conversation early, due to frustration.   Urine lab results reviewed and WNL.   : She continues to be labile alternating from kind and cheerful to frustrated rapidly. It appears her mood switches are based off the topic discussed. She has tangential speech and guided the conversation on her own. She admits to smoking since the age of 14 years old, stating she currently smokes 1 pack a week, as a vice when stressed and enjoys accompanying the cigarette with a beer. She speaks of her relationships with family and positive relationships with other patient's on floor. When informed the writer would like to ask just a few more questions and to discuss medications, the patient became irritated and demanded the writer leave, using profanity as the patient exited. Patient was seen on milieu dancing to the sound of the phone, stating it was for exercise. She was also seen by staff walking around with a towel on her head. Added Olanzapine 2.5 mg to be dispensed in the AM. Patient denied the medication, as well as all other medications except for her thiamin and multivitamin. She also refused her lab work.   : She continues to be labile alternating from kind and cheerful to frustrated rapidly. She continues to have tangential thought, bringing up topics on her own that frustrate her before blaming writer and using profanity until writer leaves. She often repeats words after saying them and says random words after her sentence is finished. She continues to refuse medication at times, stating she does not want poison in her body. She requests prednisone and risperidone, and gets frustrated when learning prednisone cannot be provided. When asked about taking her Depakote regularly, patient states in the past it caused her to see double and hallucinate, stating she saw "space monsters." Pt denies current AH and VH. Patient gets frustrated with writer, and asks her to leave using profanity as she exits. Before writer could speak, Ms. Trinidad opened the door and requested that writer leave, stating she will be suing the hospital so that she can leave. When asked if she is referring to a 72 hour letter, Ms. Trinidad ignores writer and continues to use profanity requesting writer leave.   Will fill out treatment over objection paper work.   : Patient continues to have tangential speech. She reports having a "good" mood and sleeping "very well" stating she slept for 4 hrs hours last night. Patient requests that she be discharged home but understands the 72 hour letter placed yesterday can take up to 3 days to be approved. Pt denies SI/HI/AVH or PI.  : Pt continues to have circumstantial speech. She is agitated that she has not been discharged and expresses frustration with the daily routine while here. She refuses to speak with staff further. Not compliant with medications and remains selective of the one's she takes.   Continuing with TOO. In regards to 72 hour letter, retention was filed.   ;;: rather pleasant with the writer; smiling ; Sleep  appetite ok; no pain issues. Not endorsing  suicidal or homicidal ideation intent or plans; no mention of auditory or visual hallucinations anxious;  somewhat internally preoccupied.  "Will you come back and talk to me?"  Yet irritable with other staff on the DASA scale. Current medications aluminum hydroxide/magnesium hydroxide/simethicone Suspension 30 milliLiter(s) Oral every 6 hours PRN  cholecalciferol 600 Unit(s) Oral daily  divalproex  milliGRAM(s) Oral two times a day for mood   folic acid 1 milliGRAM(s) Oral daily  hydroxychloroquine 200 milliGRAM(s) Oral two times a day  ibuprofen  Tablet. 200 milliGRAM(s) Oral three times a day PRN  methotrexate 7.5 milliGRAM(s) Oral <User Schedule>  multivitamin 1 Tablet(s) Oral daily  OLANZapine 5 milliGRAM(s) Oral every 8 hours PRN  OLANZapine 10 milliGRAM(s) Oral at bedtime for mood  thiamine 100 milliGRAM(s) Oral daily  ;;: in the vestibule of the special care area with her walker calm and polite;  informed more directible by staff;  Not endorsing  suicidal or homicidal ideation intent or plans; no mention of auditory or visual hallucinations wants her cell phone so she can get phone numbers.  slightly anxious.  Sleep  appetite ok; no pain issues.   Problems as per EMR: (Admit Diagnosis) RECURRENT FALLS ;(Admit Diagnosis) PSY ;(PMH) Rheumatoid arthritis ;(PMH) Anemia ;(PMH) Lupus ;(PMH) Bipolar affective ;(Problem/DX) Severe protein-calorie malnutrition ;(Problem/DX) Systemic lupus ;(Problem/DX) Rheumatoid arthritis ;(Problem/DX) Alcohol abuse ;(PSH) H/O:  section ;(R/O Problem/DX) Neurocognitive deficits ;(R/O Problem/DX) Schizoaffective disorder ;  At this time no acute issues on current medication regime.    : Labile, elevated, observed dancing in the milieu, poor insight and judgment, med non-compliant, sleeping poorly   Patient noted to have euthymic mood, did take zyprexa last night, no irritability or lability today. No si/hi/avh or PI. Anticipating going to court for retention today   Patient remains labile with fluctuating moods of irritability and elevated euphoria. Refused all psychiatric medications last night and this morning. No reported si/hi/avh or PI. Patient is awaiting court today for retention.    Patient refusing all medications since losing in court last week (retention), loud, irritable, cursing/foul language, frequently dancing and gyrating in the halls and in the chair  ;;: making strange gestures and making vulgar statements next to the nursing station;  was not able to directly participate in review of hearing for treatment over objection because of behavioral dyscontrol but aware of the proceedings.  Calmed after the proceedings sitting with other patients stating to the writer "He said 'suck my yao!')  however no staff witnessed any such event.  otherwise internally preoccupied ; continues refusing medicaitons that were taken in the past.  awaiting court hearing.  Review was with the [facility]  and Dr. Ruperto Garcia consultant.     Continues to refuse all medications, highly irritable and labile, continues to dance incessantly. Treatment update provided to daughter   Remains unchanged, is not taking medications, states that she is not sleeping. Is anticipating going to court for TOO tomorrow   Remains unchanged. No court today, pt to be discharged per court order by next week  Hostile towards treatment team, me non-adherent   Hostile and overall unchanged, anticipating discharge on Friday

## 2024-11-27 NOTE — BH INPATIENT PSYCHIATRY DISCHARGE NOTE - NSBHMETABOLIC_PSY_ALL_CORE_FT
BMI: BMI (kg/m2): 18.5 (11-05-24 @ 08:51)  HbA1c: A1C with Estimated Average Glucose Result: 5.7 % (10-26-24 @ 22:00)    Glucose:   BP: 110/68 (11-27-24 @ 16:05) (97/67 - 136/83)Vital Signs Last 24 Hrs  T(C): 36.4 (11-27-24 @ 16:05), Max: 36.4 (11-26-24 @ 16:41)  T(F): 97.6 (11-27-24 @ 16:05), Max: 97.6 (11-27-24 @ 09:00)  HR: 89 (11-27-24 @ 16:05) (72 - 89)  BP: 110/68 (11-27-24 @ 16:05) (102/68 - 127/77)  BP(mean): --  RR: 18 (11-27-24 @ 16:05) (18 - 18)  SpO2: 99% (11-27-24 @ 16:05) (99% - 99%)      Lipid Panel: Date/Time: 10-26-24 @ 22:00  Cholesterol, Serum: 158  LDL Cholesterol Calculated: 85  HDL Cholesterol, Serum: 62  Total Cholesterol/HDL Ration Measurement: --  Triglycerides, Serum: 54

## 2024-11-27 NOTE — BH INPATIENT PSYCHIATRY PROGRESS NOTE - NSBHMSEINSIGHT_PSY_A_CORE
Poor
Fair
Poor
Fair
Poor
Fair
Poor
Poor
Fair
Poor
Poor

## 2024-11-27 NOTE — BH TREATMENT PLAN - NSTXDCOPNODATEEST_PSY_ALL_CORE
11-Nov-2024
28-Oct-2024
11-Nov-2024
11-Nov-2024
28-Oct-2024
11-Nov-2024
11-Nov-2024
28-Oct-2024
11-Nov-2024

## 2024-11-27 NOTE — BH INPATIENT PSYCHIATRY PROGRESS NOTE - NSTXDCOPNOPROGRES_PSY_ALL_CORE
No Change
Improving
Improving
No Change
No Change
Improving
No Change
Improving
Improving
No Change
No Change
Improving
Improving

## 2024-11-27 NOTE — BH TREATMENT PLAN - NSTXDCOPNOINTERSW_PSY_ALL_CORE
Liaise with family and collateral providers weekly for 1 hour or as needed for discharge planning purposes. 

## 2024-11-27 NOTE — BH INPATIENT PSYCHIATRY PROGRESS NOTE - NSTXPSYCHOINTERMD_PSY_ALL_CORE
psychopharm x15 minutes, pursue TOO if sufficiently nonadherent
psychopharm x15 minutes
psychopharm x15 minutes, Zyprexa offered, but declined by patient so, pursue TOO if sufficiently nonadherent
psychopharm x15 minutes
psychopharm x15 minutes, Zyprexa offered, but declined by patient so, pursue TOO if sufficiently nonadherent
psychopharm x15 minutes
psychopharm x15 minutes, pursue TOO if sufficiently nonadherent
psychopharm x15 minutes, Zyprexa offered, but declined by patient so, pursue TOO if sufficiently nonadherent
psychopharm x15 minutes
psychopharm x15 minutes, pursue TOO if sufficiently nonadherent
psychopharm x15 minutes, pursue TOO if sufficiently nonadherent
psychopharm x15 minutes, Zyprexa offered, but declined by patient so, pursue TOO if sufficiently nonadherent

## 2024-11-27 NOTE — BH INPATIENT PSYCHIATRY PROGRESS NOTE - NSBHMSEKNOWHOW_PSY_ALL_CORE
Vocabulary

## 2024-11-27 NOTE — BH TREATMENT PLAN - NSTXCAREGIVERPARTICIPATE_PSY_P_CORE
Family/Caregiver participated in development of after care plan
Family/Caregiver participated in identification of needs/problems/goals for treatment/Family/Caregiver participated in defining interventions/Family/Caregiver participated in development of after care plan
Family/Caregiver participated in identification of needs/problems/goals for treatment/Family/Caregiver participated in defining interventions/Family/Caregiver participated in development of after care plan
Family/Caregiver participated in identification of needs/problems/goals for treatment
Family/Caregiver participated in identification of needs/problems/goals for treatment/Family/Caregiver participated in defining interventions/Family/Caregiver participated in development of after care plan
Family/Caregiver participated in identification of needs/problems/goals for treatment/Family/Caregiver participated in defining interventions/Family/Caregiver participated in development of after care plan
Family/Caregiver participated in identification of needs/problems/goals for treatment
Family/Caregiver participated in identification of needs/problems/goals for treatment/Family/Caregiver participated in defining interventions/Family/Caregiver participated in development of after care plan
Family/Caregiver participated in identification of needs/problems/goals for treatment

## 2024-11-27 NOTE — BH INPATIENT PSYCHIATRY PROGRESS NOTE - NSTXPSYCHODATENEW_PSY_ALL_CORE
18-Nov-2024
18-Nov-2024
04-Dec-2024
18-Nov-2024

## 2024-11-27 NOTE — BH INPATIENT PSYCHIATRY PROGRESS NOTE - NSCGIIMPROVESX_PSY_ALL_CORE
6 = Much worse - clinically significant increase in symptoms and diminished functioning
5 = Minimally worse - slightly worse but may not be clinically meaningful; may represent very little change in basic clinical status or functional capacity
6 = Much worse - clinically significant increase in symptoms and diminished functioning
6 = Much worse - clinically significant increase in symptoms and diminished functioning
2 = Much improved - notably better with signficant reduction of symptoms; increase in the level of functioning but some symptoms remain
6 = Much worse - clinically significant increase in symptoms and diminished functioning
2 = Much improved - notably better with signficant reduction of symptoms; increase in the level of functioning but some symptoms remain
6 = Much worse - clinically significant increase in symptoms and diminished functioning
6 = Much worse - clinically significant increase in symptoms and diminished functioning
2 = Much improved - notably better with signficant reduction of symptoms; increase in the level of functioning but some symptoms remain

## 2024-11-27 NOTE — BH INPATIENT PSYCHIATRY DISCHARGE NOTE - HOSPITAL COURSE
10/28: Pt is "angry" at her daughter for "tricking" her into admission. Concurrently, pt claims to be happy about her admittance as she is hopeful she will receive the recourses she feels are necessary for her self-care. She admits to poor eating due to her malodorous home. She admits to prior AVH in the setting of visual hallucinations of zombies, "naked people, bugs and AH of people cussing and people in her wall at home "shooting up" with last presentation 2 days prior to evaluation. She states she is sleeping better since her arrival. She denies current SI/HI/AVH/PI.   MMSE: 23 - Mild Cognitive Impairment.   Ordered repeat UA and Cx after viewing abnormal results on 10/24  10/29: Pt is "happy" to be here and get the care she needs. She continues to have circumstantial thought but appears to be aware of this. She admits to eating and sleeping well stating she is eating and sleeping the best she has in some time. She admits to having an issue with a pt but states she is happy to move on from it. She denies SI/HI/AVH or PI.   10/30: Pt is "excited" to learn of new roommate which she hopes to byrd with in an attempt to feel better about missing her granddaughter. She reports having issues with continence and requests adult diapers as she is unable to make it to the bathroom in time. She described an event of incontinence occurring last night. After conversation about medication, pt agrees to comply, despite her reluctance due to belief her psychiatric medication is "poison." She is sleeping well (4 hours), and eating well, though she is concerned she is eating too much. Pt denies SI/HI/AVH/PI.   Urine cup provided for pt to obtain repeat UA and Cx  10/31: Pt is confused, stating she needed to get vitals done when she already had them done this AM. She is irritable when discussing medications and refuses to speak about it with writer, ending the conversation early, due to frustration.   Urine lab results reviewed and WNL.   : She continues to be labile alternating from kind and cheerful to frustrated rapidly. It appears her mood switches are based off the topic discussed. She has tangential speech and guided the conversation on her own. She admits to smoking since the age of 14 years old, stating she currently smokes 1 pack a week, as a vice when stressed and enjoys accompanying the cigarette with a beer. She speaks of her relationships with family and positive relationships with other patient's on floor. When informed the writer would like to ask just a few more questions and to discuss medications, the patient became irritated and demanded the writer leave, using profanity as the patient exited. Patient was seen on milieu dancing to the sound of the phone, stating it was for exercise. She was also seen by staff walking around with a towel on her head. Added Olanzapine 2.5 mg to be dispensed in the AM. Patient denied the medication, as well as all other medications except for her thiamin and multivitamin. She also refused her lab work.   : She continues to be labile alternating from kind and cheerful to frustrated rapidly. She continues to have tangential thought, bringing up topics on her own that frustrate her before blaming writer and using profanity until writer leaves. She often repeats words after saying them and says random words after her sentence is finished. She continues to refuse medication at times, stating she does not want poison in her body. She requests prednisone and risperidone, and gets frustrated when learning prednisone cannot be provided. When asked about taking her Depakote regularly, patient states in the past it caused her to see double and hallucinate, stating she saw "space monsters." Pt denies current AH and VH. Patient gets frustrated with writer, and asks her to leave using profanity as she exits. Before writer could speak, Ms. Trinidad opened the door and requested that writer leave, stating she will be suing the hospital so that she can leave. When asked if she is referring to a 72 hour letter, Ms. Trinidad ignores writer and continues to use profanity requesting writer leave.   Will fill out treatment over objection paper work.   : Patient continues to have tangential speech. She reports having a "good" mood and sleeping "very well" stating she slept for 4 hrs hours last night. Patient requests that she be discharged home but understands the 72 hour letter placed yesterday can take up to 3 days to be approved. Pt denies SI/HI/AVH or PI.  : Pt continues to have circumstantial speech. She is agitated that she has not been discharged and expresses frustration with the daily routine while here. She refuses to speak with staff further. Not compliant with medications and remains selective of the one's she takes.   Continuing with TOO. In regards to 72 hour letter, retention was filed.   ;;: rather pleasant with the writer; smiling ; Sleep  appetite ok; no pain issues. Not endorsing  suicidal or homicidal ideation intent or plans; no mention of auditory or visual hallucinations anxious;  somewhat internally preoccupied.  "Will you come back and talk to me?"  Yet irritable with other staff on the DASA scale. Current medications aluminum hydroxide/magnesium hydroxide/simethicone Suspension 30 milliLiter(s) Oral every 6 hours PRN  cholecalciferol 600 Unit(s) Oral daily  divalproex  milliGRAM(s) Oral two times a day for mood   folic acid 1 milliGRAM(s) Oral daily  hydroxychloroquine 200 milliGRAM(s) Oral two times a day  ibuprofen  Tablet. 200 milliGRAM(s) Oral three times a day PRN  methotrexate 7.5 milliGRAM(s) Oral <User Schedule>  multivitamin 1 Tablet(s) Oral daily  OLANZapine 5 milliGRAM(s) Oral every 8 hours PRN  OLANZapine 10 milliGRAM(s) Oral at bedtime for mood  thiamine 100 milliGRAM(s) Oral daily  ;;: in the vestibule of the special care area with her walker calm and polite;  informed more directible by staff;  Not endorsing  suicidal or homicidal ideation intent or plans; no mention of auditory or visual hallucinations wants her cell phone so she can get phone numbers.  slightly anxious.  Sleep  appetite ok; no pain issues.   Problems as per EMR: (Admit Diagnosis) RECURRENT FALLS ;(Admit Diagnosis) PSY ;(PMH) Rheumatoid arthritis ;(PMH) Anemia ;(PMH) Lupus ;(PMH) Bipolar affective ;(Problem/DX) Severe protein-calorie malnutrition ;(Problem/DX) Systemic lupus ;(Problem/DX) Rheumatoid arthritis ;(Problem/DX) Alcohol abuse ;(PSH) H/O:  section ;(R/O Problem/DX) Neurocognitive deficits ;(R/O Problem/DX) Schizoaffective disorder ;  At this time no acute issues on current medication regime.    : Labile, elevated, observed dancing in the milieu, poor insight and judgment, med non-compliant, sleeping poorly   Patient noted to have euthymic mood, did take zyprexa last night, no irritability or lability today. No si/hi/avh or PI. Anticipating going to court for retention today   Patient remains labile with fluctuating moods of irritability and elevated euphoria. Refused all psychiatric medications last night and this morning. No reported si/hi/avh or PI. Patient is awaiting court today for retention.    Patient refusing all medications since losing in court last week (retention), loud, irritable, cursing/foul language, frequently dancing and gyrating in the halls and in the chair  ;;: making strange gestures and making vulgar statements next to the nursing station;  was not able to directly participate in review of hearing for treatment over objection because of behavioral dyscontrol but aware of the proceedings.  Calmed after the proceedings sitting with other patients stating to the writer "He said 'suck my yao!')  however no staff witnessed any such event.  otherwise internally preoccupied ; continues refusing medicaitons that were taken in the past.  awaiting court hearing.  Review was with the [facility]  and Dr. Ruperto Garcia consultant.     Continues to refuse all medications, highly irritable and labile, continues to dance incessantly. Treatment update provided to daughter   Remains unchanged, is not taking medications, states that she is not sleeping. Is anticipating going to court for TOO tomorrow   Remains unchanged. No court today, pt to be discharged per court order by next week  Hostile towards treatment team, med non-adherent. Family is cleaning apartment in anticipation of her discharge    Patient is being discharged on Court order.   No medications prescribed as patient refused all medications after her court hearing.

## 2024-11-27 NOTE — BH INPATIENT PSYCHIATRY PROGRESS NOTE - NSBHMSESPEECH_PSY_A_CORE
Abnormal as indicated, otherwise normal...
Abnormal as indicated, otherwise normal...
Normal volume, rate, productivity, spontaneity and articulation
Abnormal as indicated, otherwise normal...
Normal volume, rate, productivity, spontaneity and articulation
Abnormal as indicated, otherwise normal...
Normal volume, rate, productivity, spontaneity and articulation
Abnormal as indicated, otherwise normal...
Normal volume, rate, productivity, spontaneity and articulation
Abnormal as indicated, otherwise normal...

## 2024-11-27 NOTE — BH INPATIENT PSYCHIATRY DISCHARGE NOTE - NSBHASSESSSUMMFT_PSY_ALL_CORE
Patient is a 69 year old AA female, single with adult daughter, domiciled alone previously with [facility] services, with PPH of bipolar disorder and depression per daughter and PMH of rheumatoid arthritis and lupus, who was referred by APS due to concern for poor self-care and was admitted to the medicine service for failure to thrive. Psychiatry was consulted for a psychiatric evaluation for bipolar disorder and patient was transferred to [facility] under involuntary status due to poor self-care, mood lability, and psychosis (bizarre delusion of being poisoned by TONEY newton). On evaluation, patient is significantly calmer and more cooperative today than yesterday; however, patient appears religiously preoccupied and hyperverbal today. Patient briefly appeared internally preoccupied and appeared to be speaking about being poisoned with medications with an unknown entity in the room as her gaze was focused on a specific area; however, she immediately minimized it. She presents very differently from yesterday though per daughter, patient may be attempting to feign normalcy to expedite discharge. Per collateral from daughter and APS worker supervisor, patient has demonstrated poor self-care that has deteriorated significantly in the last several months resulting in weight loss, disorganized behavior, an untenable living situation, and poor hygiene. Patient has been very irritable and angry for several years, and has a history of good medication effect when hospitalized but poor compliance in the community. Per daughter, patient has also reported auditory hallucinations recently, but has not done so previously. Patient's current presentation may be secondary to decompensation in bipolar disorder as patient's symptoms are slightly manic (hyperverbal, mood lability, Zoroastrianism preoccupation) and psychotic (auditory hallucinations, paranoia). However, given longstanding history of poor self-care and marked change in behavior a decade ago, schizoaffective disorder is more likely at this time. Patient requires inpatient psychiatric admission as she is unable to care for herself in the setting of psychosis, poor insight and judgment, and medication non-compliance.    - Continue Depakote 250 bid for mood stabilization  -  Zyprexa 10mg QHS for psychosis, consider titrating as patient was previously on 15 mg   - methotrexate and hydroxychlorquine   - Supplement meals with Ensure  - PT evaluation and treatment due to marked difficulty in ambulating requiring assistance      10/28: Pt is "angry" at her daughter for "tricking" her into admission. Concurrently, pt claims to be happy about her admittance as she is hopeful she will receive the recourses she feels are necessary for her self-care. She admits to poor eating due to her malodorous home. She admits to prior AVH in the setting of visual hallucinations of zombies, "naked people, bugs and AH of people cussing and people in her wall at home "shooting up" with last presentation 2 days prior to evaluation. She states she is sleeping better since her arrival. She denies current SI/HI/AVH/PI.   MMSE: 23 - Mild Cognitive Impairment.   Ordered repeat UA and Cx after viewing abnormal results on 10/24  10/29: Pt is "happy" to be here and get the care she needs. She continues to have circumstantial thought but appears to be aware of this. She admits to eating and sleeping well stating she is eating and sleeping the best she has in some time. She admits to having an issue with a pt but states she is happy to move on from it. She denies SI/HI/AVH or PI.   10/30: Pt is "excited" to learn of new roommate which she hopes to byrd with in an attempt to feel better about missing her granddaughter. She reports having issues with continence and requests adult diapers as she is unable to make it to the bathroom in time. She described an event of incontinence occurring last night. After conversation about medication, pt agrees to comply, despite her reluctance due to belief her psychiatric medication is "poison." She is sleeping well (4 hours), and eating well, though she is concerned she is eating too much. Pt denies SI/HI/AVH/PI.   Urine cup provided for pt to obtain repeat UA and Cx  10/31: Pt is confused, stating she needed to get vitals done when she already had them done this AM. She is irritable when discussing medications and refuses to speak about it with writer, ending the conversation early, due to frustration.   Urine lab results reviewed and WNL.   : She continues to be labile alternating from kind and cheerful to frustrated rapidly. It appears her mood switches are based off the topic discussed. She has tangential speech and guided the conversation on her own. She admits to smoking since the age of 14 years old, stating she currently smokes 1 pack a week, as a vice when stressed and enjoys accompanying the cigarette with a beer. She speaks of her relationships with family and positive relationships with other patient's on floor. When informed the writer would like to ask just a few more questions and to discuss medications, the patient became irritated and demanded the writer leave, using profanity as the patient exited. Patient was seen on milieu dancing to the sound of the phone, stating it was for exercise. She was also seen by staff walking around with a towel on her head. Added Olanzapine 2.5 mg to be dispensed in the AM. Patient denied the medication, as well as all other medications except for her thiamin and multivitamin. She also refused her lab work.   : She continues to be labile alternating from kind and cheerful to frustrated rapidly. She continues to have tangential thought, bringing up topics on her own that frustrate her before blaming writer and using profanity until writer leaves. She often repeats words after saying them and says random words after her sentence is finished. She continues to refuse medication at times, stating she does not want poison in her body. She requests prednisone and risperidone, and gets frustrated when learning prednisone cannot be provided. When asked about taking her Depakote regularly, patient states in the past it caused her to see double and hallucinate, stating she saw "space monsters." Pt denies current AH and VH. Patient gets frustrated with writer, and asks her to leave using profanity as she exits. Before writer could speak, Ms. Trinidad opened the door and requested that writer leave, stating she will be suing the hospital so that she can leave. When asked if she is referring to a 72 hour letter, Ms. Trinidad ignores writer and continues to use profanity requesting writer leave.   Will fill out treatment over objection paper work.   : Patient continues to have tangential speech. She reports having a "good" mood and sleeping "very well" stating she slept for 4 hrs hours last night. Patient requests that she be discharged home but understands the 72 hour letter placed yesterday can take up to 3 days to be approved. Pt denies SI/HI/AVH or PI.  : Pt continues to have circumstantial speech. She is agitated that she has not been discharged and expresses frustration with the daily routine while here. She refuses to speak with staff further. Not compliant with medications and remains selective of the one's she takes.   Continuing with TOO. In regards to 72 hour letter, retention was filed.   ;;: rather pleasant with the writer; smiling ; Sleep  appetite ok; no pain issues. Not endorsing  suicidal or homicidal ideation intent or plans; no mention of auditory or visual hallucinations anxious;  somewhat internally preoccupied.  "Will you come back and talk to me?"  Yet irritable with other staff on the DASA scale. Current medications aluminum hydroxide/magnesium hydroxide/simethicone Suspension 30 milliLiter(s) Oral every 6 hours PRN  cholecalciferol 600 Unit(s) Oral daily  divalproex  milliGRAM(s) Oral two times a day for mood   folic acid 1 milliGRAM(s) Oral daily  hydroxychloroquine 200 milliGRAM(s) Oral two times a day  ibuprofen  Tablet. 200 milliGRAM(s) Oral three times a day PRN  methotrexate 7.5 milliGRAM(s) Oral <User Schedule>  multivitamin 1 Tablet(s) Oral daily  OLANZapine 5 milliGRAM(s) Oral every 8 hours PRN  OLANZapine 10 milliGRAM(s) Oral at bedtime for mood  thiamine 100 milliGRAM(s) Oral daily  ;;: in the vestibule of the special care area with her walker calm and polite;  informed more directible by staff;  Not endorsing  suicidal or homicidal ideation intent or plans; no mention of auditory or visual hallucinations wants her cell phone so she can get phone numbers.  slightly anxious.  Sleep  appetite ok; no pain issues.   Problems as per EMR: (Admit Diagnosis) RECURRENT FALLS ;(Admit Diagnosis) PSY ;(PMH) Rheumatoid arthritis ;(PMH) Anemia ;(PMH) Lupus ;(PMH) Bipolar affective ;(Problem/DX) Severe protein-calorie malnutrition ;(Problem/DX) Systemic lupus ;(Problem/DX) Rheumatoid arthritis ;(Problem/DX) Alcohol abuse ;(PSH) H/O:  section ;(R/O Problem/DX) Neurocognitive deficits ;(R/O Problem/DX) Schizoaffective disorder ;  At this time no acute issues on current medication regime.    : Labile, elevated, observed dancing in the milieu, poor insight and judgment, med non-compliant, sleeping poorly   Patient noted to have euthymic mood, did take zyprexa last night, no irritability or lability today. No si/hi/avh or PI. Anticipating going to court for retention today   Patient remains labile with fluctuating moods of irritability and elevated euphoria. Refused all psychiatric medications last night and this morning. No reported si/hi/avh or PI. Patient is awaiting court today for retention.    Patient refusing all medications since losing in court last week (retention), loud, irritable, cursing/foul language, frequently dancing and gyrating in the halls and in the chair  ;;: making strange gestures and making vulgar statements next to the nursing station;  was not able to directly participate in review of hearing for treatment over objection because of behavioral dyscontrol but aware of the proceedings.  Calmed after the proceedings sitting with other patients stating to the writer "He said 'suck my yao!')  however no staff witnessed any such event.  otherwise internally preoccupied ; continues refusing medicaitons that were taken in the past.  awaiting court hearing.  Review was with the [facility]  and Dr. Ruperto Garcia consultant.     Continues to refuse all medications, highly irritable and labile, continues to dance incessantly. Treatment update provided to daughter   Remains unchanged, is not taking medications, states that she is not sleeping. Is anticipating going to court for TOO tomorrow   Remains unchanged. No court today, pt to be discharged per court order by next week  Hostile towards treatment team, me non-adherent Patient is a 69 year old AA female, single with adult daughter, domiciled alone previously with [facility] services, with PPH of bipolar disorder and depression per daughter and PMH of rheumatoid arthritis and lupus, who was referred by APS due to concern for poor self-care and was admitted to the medicine service for failure to thrive. Psychiatry was consulted for a psychiatric evaluation for bipolar disorder and patient was transferred to [facility] under involuntary status due to poor self-care, mood lability, and psychosis (bizarre delusion of being poisoned by TONEY new[place]). On evaluation, patient is significantly calmer and more cooperative today than yesterday; however, patient appears religiously preoccupied and hyperverbal today. Patient briefly appeared internally preoccupied and appeared to be speaking about being poisoned with medications with an unknown entity in the room as her gaze was focused on a specific area; however, she immediately minimized it. She presents very differently from yesterday though per daughter, patient may be attempting to feign normalcy to expedite discharge. Per collateral from daughter and APS worker supervisor, patient has demonstrated poor self-care that has deteriorated significantly in the last several months resulting in weight loss, disorganized behavior, an untenable living situation, and poor hygiene. Patient has been very irritable and angry for several years, and has a history of good medication effect when hospitalized but poor compliance in the community. Per daughter, patient has also reported auditory hallucinations recently, but has not done so previously. Patient's current presentation may be secondary to decompensation in bipolar disorder as patient's symptoms are slightly manic (hyperverbal, mood lability, Christianity preoccupation) and psychotic (auditory hallucinations, paranoia). However, given longstanding history of poor self-care and marked change in behavior a decade ago, schizoaffective disorder is more likely at this time. Patient requires inpatient psychiatric admission as she is unable to care for herself in the setting of psychosis, poor insight and judgment, and medication non-compliance.    - Continue Depakote 250 bid for mood stabilization  -  Zyprexa 10mg QHS for psychosis, consider titrating as patient was previously on 15 mg   - methotrexate and hydroxychlorquine   - Supplement meals with Ensure  - PT evaluation and treatment due to marked difficulty in ambulating requiring assistance

## 2024-11-27 NOTE — BH TREATMENT PLAN - NSBHPRIMARYDX_PSY_ALL_CORE
Bipolar disorder with psychotic features    

## 2024-11-27 NOTE — BH INPATIENT PSYCHIATRY PROGRESS NOTE - NS ED [facility] MED ROS PSYCHIATRIC
See HPI
Clothing
See HPI

## 2024-11-27 NOTE — BH TREATMENT PLAN - NSTXDCOPNOGOALOTHER_PSY_ALL_CORE
Patient will explore psychosocial issues and barriers to discharge with  while engaging in discharge planning for 30 minutes per week.

## 2024-11-27 NOTE — BH INPATIENT PSYCHIATRY PROGRESS NOTE - NSBHMSESPABN_PSY_A_CORE
Loud volume/Increased productivity/Other
Loud volume/Increased productivity/Other
Increased productivity
Loud volume/Increased productivity/Other
Increased productivity
Increased productivity
Loud volume/Increased productivity/Other
Loud volume/Increased productivity/Other
Increased productivity
Loud volume/Increased productivity/Other

## 2024-11-27 NOTE — BH INPATIENT PSYCHIATRY PROGRESS NOTE - NSTREATMENTCERTY_PSY_ALL_CORE

## 2024-11-27 NOTE — BH TREATMENT PLAN - NSTXPLANTHERAPYSESSIONSFT_PSY_ALL_CORE
11-20-24  Type of therapy: Creative arts therapy  Type of session: Group  Level of patient participation: Not engaged  --  Therapy conducted by: Psych rehab  Therapy Summary: Pt has continued to decline groups.  Pt is visible on the unit at intervals and social with select peers.  Continued encouragement to attend groups will be provided.  
  10-30-24  --  --  Level of patient participation: Resistance to participation  Duration of participation: Less than 15 minutes  Therapy conducted by: Psych rehab  Therapy Summary: Pt continues to decline groups of all modalities; pt is pleasant on approach but difficult to interact with continuously. Pt. will continue to be encouraged to attend groups and interact more frequently with others  
  11-09-24  Type of therapy: Creative arts therapy  Type of session: Group  Level of patient participation: Engaged, Participates  Duration of participation: 45 minutes  Therapy conducted by: Psych rehab  Therapy Summary: Pt. continues to decline invitations to most groups but attended an open art studio group this afternoon. Pt. appeared bright and was social with select peers. Pt. was engaged in creating a beaded bracelet and requesting songs to listen to. Pt. requested additional art supplies (markers) to take to her room. Continued encouragement to attend groups will be provided.  
  11-12-24  Type of therapy: Music therapy  Type of session: Group  Level of patient participation: Participates  Duration of participation: 30 minutes  Therapy conducted by: Psych rehab  Therapy Summary: Pt attended this music therapy group for about half the duration of group. She tends to show interest in music and dancing within the milieu, but continues to decline most offered groups even when invited.  
  11-20-24  Type of therapy: Creative arts therapy  Type of session: Group  Level of patient participation: Not engaged  --  Therapy conducted by: Psych rehab  Therapy Summary: Pt has continued to decline groups.  Pt is visible on the unit at intervals and social with select peers.  Continued encouragement to attend groups will be provided.  
  11-20-24  Type of therapy: Creative arts therapy  Type of session: Group  Level of patient participation: Not engaged  --  Therapy conducted by: Psych rehab  Therapy Summary: Pt has continued to decline groups.  Pt is visible on the unit at intervals and social with select peers.  Continued encouragement to attend groups will be provided.

## 2024-11-27 NOTE — BH TREATMENT PLAN - NSTXPSYCHOGOAL_PSY_ALL_CORE
Other...
Herrera Enriquez  Interventional Cardiology  [address] [place] [address], [address]  [place] [address]  Phone: [phone]  Fax: [phone]  Established Patient  Follow Up Time: 1 week  
Other...

## 2024-11-27 NOTE — BH INPATIENT PSYCHIATRY PROGRESS NOTE - NSTXPSYCHODATETRGT_PSY_ALL_CORE
13-Nov-2024
20-Nov-2024
06-Nov-2024
27-Nov-2024
06-Nov-2024
13-Nov-2024
13-Nov-2024
02-Nov-2024
04-Dec-2024
11-Nov-2024
13-Nov-2024
13-Nov-2024
25-Nov-2024
27-Nov-2024
13-Nov-2024
11-Nov-2024
27-Nov-2024
27-Nov-2024
25-Nov-2024
06-Nov-2024
05-Nov-2024

## 2024-11-27 NOTE — BH INPATIENT PSYCHIATRY DISCHARGE NOTE - DESCRIPTION
Patient lives alone and has had APS involvement for 2+ years due to constantly forgetting to pay her bills for rent. Her daughter is her healthcare proxy and lives in [place].

## 2024-11-27 NOTE — BH INPATIENT PSYCHIATRY DISCHARGE NOTE - HPI (INCLUDE ILLNESS QUALITY, SEVERITY, DURATION, TIMING, CONTEXT, MODIFYING FACTORS, ASSOCIATED SIGNS AND SYMPTOMS)
Patient is a 69 year old AA female, single with adult daughter, domiciled alone previously with [facility] services, with PPH of bipolar disorder and depression per daughter and PMH of rheumatoid arthritis and lupus, who was referred by APS due to concern for poor self-care and was admitted to the medicine service for failure to thrive. Psychiatry was consulted for a psychiatric evaluation for bipolar disorder and patient was transferred to [facility] under involuntary status due to poor self-care, mood lability, and psychosis (bizarre delusion of being poisoned by ghosts, ).     On evaluation, patient is sitting calmly in a chair and smiling. She states that she is feeling "much better" and attributes it to the medication that she took last night. She states that it feels like she "woke up from a nightmare" and wants to "leave it all behind." She is reluctant to discuss what that means; when writer addresses her thoughts of being poisoned by ghosts, she states that "it was part of that nightmare, I was hallucinating" and she does not wish to discuss further. She states that she is happy to stay here as long as she needs to to "get placement" and get the help she needs with her medications and her health. She asks for Ensure because she would like to gain weight. She reports sleeping well. She asks if she is able to shower. She makes several references to God throughout the evaluation, stating that "God told me" and "I prayed about what to do last night." She states that she is willing to continue taking medications, and is open to taking medications for her rheumatoid arthritis and lupus as they did help her previously. She states that she will only take them here as they could be poison and she will not take them at home. Her speech changes slightly while saying this and she sounds mildly angry while she stares at a specific point; when asked if she was seeing anything and who she was talking to, she responds "I was just thinking out loud." When writer redirected her, she explained that she would be willing to take medications and be monitored here before continuing at home. She looks at the PCA and states "You heard that? You weren't supposed to hear that" and winks at her. She adamantly denies any desire to harm herself or others. She asks slightly impatiently if writer has more questions for her as she wants to go to the bathroom; when writer tells her that she can go first and continue the evaluation later, she states that she would not want to continue afterward. She denies any auditory or visual hallucinations at this time. She states that she is willing to talk to her mother and daughter again, and states that even though she has not forgiven them, she is okay with them and would like to speak with them on the phone. She will consider letting her daughter visit depending on how she feels after talking to her on the phone.     Per discussion with RN Roman Valero, patient was calm yesterday after a discussion with her about her belongings; she was initially very preoccupied with them and was very irritable. However, after it was explained where they were going and she was given receipts, she was cooperative. She made several allusions to God and explained that she grew up Confucianist, and attends Hinduism every weekend.     Per collateral from daughter Abril Trinidad, patient is not Quaker at all. She states that she was Quaker as a child and her grandmother is Quaker, but her mother is not. She states that her mother does not attend Hinduism at all, and has not been Quaker since adulthood. She states that her mother does not make references to God. She states that her mother is smart and knows what to say to get discharged.     Per writer's initial evaluation of patient yesterday:   Patient is a 69 year old AA female, single with adult daughter, domiciled alone previously with [facility] services, with PPH of bipolar disorder and depression per daughter and PMH of rheumatoid arthritis and lupus, who was referred by APS due to concern for poor self-care and is currently admitted for failure to thrive. Psychiatry was consulted for a psychiatric evaluation at the request of patient's daughter.     Writer spoke with APS worker supervisor Ms. Bergman for collateral on patient's home care needs. She states that patient has been in care with APS for "a long time" for financial management due to patient's history of bipolar disorder and medication non-compliance. She states that the APS worker visits patient once a month, and reported that patient has been getting worse for the last several months, eating less and not taking care of herself. She accompanied her on the last visit and found patient to be weak and her home to be in utter disarray. She states that there is nowhere to step on the floor in the home, and the home requires significant deep cleaning prior to a safe return. She also plans to get a HHA for patient, but cannot do so at this time due to the home situation. She states that she does not have more information but patient's APS worker can be reached on Monday for further collateral. She states that the stove was left on during the visit.     On initial evaluation, patient is lying in bed awake and angry. She states forcefully that she has no family and wants nothing to do with her daughter and her mother. She states that she did sign consent for a deep clean, but she wants to be present when it happens as she does not want APS stealing anything. She states that she wants to go home and does not want to engage in an evaluation. Writer asked why she is so angry with her daughter; she angrily responds "Ask her! She knows why" and refuses to tell writer. She gives permission for writer to speak with her daughter, stating "I don't care, you can talk to whoever you want. Just leave me alone."     Per collateral from patient's daughter Abril Trinidad, patient is calm and nice at baseline, but has been in a "manic state" for the last two and a half years since she stopped medications and has not been in treatment. She states that patient is angry and uses vulgar language around her and her children such that she cannot take her children to see her anymore. She states that patient has been like this for the past 10 years, and pinpoints 2015, when patient's relationship with her then-boyfriend ended, to be a trigger for her change. Patient was diagnosed with bipolar disorder and depression in her 30s but was calm and normal until 2015. Patient has been hospitalized at multiple hospitals, including [facility] and [facility], last at [facility] in 2020 or 2021, and does well on medications, but immediately stops taking medications once she goes home. She states that patient had an ACT team through [facility], but they closed her case as she refused to see them. She states that patient has never forgiven her after she called APS and patient was admitted to [facility] and her home underwent a deep cleaning. She states that patient always says that people are stealing from her and going into her home at night to steal. She states that on Friday, patient reported hearing voices of 2-3 people that they know "in the walls" of the apartment. She states that patient's home has required deep cleaning through APS annually for the last few years, but it has never been this bad. She is concerned for her mother as her mother has left the stove on several times and has lost significant weight; she states that patient is about 115 lbs at baseline. She states that patient also drinks daily and becomes very irritable and provocative such that she has been in several altercations, and she is worried for her safety. She states that last time patient was present during a deep clean, patient got angry after they removed 3 items and forced them to stop. She states that the apartment is the worst that she has ever seen it. Patient tells her "Don't step on my clean clothes" when she tries to walk in the apartment. She states that patient also frequently says "I want to die" but has never acted on it.     On re-evaluation, patient was able to be momentarily redirected when writer summarized what patient's daughter had said about her motive for anger. She responds "Wouldn't you be angry if that happened to you? See why I don't want them to contact me?" When writer explains that we don't have to talk about her family and instead focus on her, she responds "Thank you! I'm the patient, not them, so why does everyone always focus on what they say?" Writer inquired about her medical health and her appetite. She states that she has not been eating much because "they've been trying to poison me" and becomes angry again. When asked who, she responds "Ghosts!" When asked why they would be doing that, she responds irritably "I don't know" and says "I don't want to talk anymore. Leave me alone." She adds that she only wants to be hospitalized for exactly 5 days on her terms, and wants to sign paperwork to indicate that that will happen as she wants to leave when she is ready.

## 2024-11-27 NOTE — BH INPATIENT PSYCHIATRY PROGRESS NOTE - NSICDXBHPRIMARYDX_PSY_ALL_CORE
Bipolar disorder with psychotic features   F31.9  

## 2024-11-27 NOTE — BH TREATMENT PLAN - NSTXDCOPNODATETRGT_PSY_ALL_CORE
27-Nov-2024
04-Dec-2024
27-Nov-2024
05-Nov-2024
27-Nov-2024
11-Nov-2024
04-Nov-2024
18-Nov-2024
25-Nov-2024

## 2024-11-27 NOTE — BH INPATIENT PSYCHIATRY PROGRESS NOTE - NSTXDCOPNODATETRGT_PSY_ALL_CORE
05-Nov-2024
11-Nov-2024
11-Nov-2024
27-Nov-2024
05-Nov-2024
11-Nov-2024
25-Nov-2024
25-Nov-2024
11-Nov-2024
18-Nov-2024
18-Nov-2024
27-Nov-2024
11-Nov-2024
18-Nov-2024
11-Nov-2024
05-Nov-2024
05-Nov-2024
27-Nov-2024
04-Nov-2024

## 2024-11-27 NOTE — BH TREATMENT PLAN - NSPTSTATEDGOAL_PSY_ALL_CORE
"I can go home by myself"
"I'm feeling much better, I want to get everything I need done here."
"I'm feeling much better, I want to get everything I need done here."
"I can go home by myself"
"I can go home by myself"
"I'm feeling much better, I want to get everything I need done here."
"I can go home by myself"

## 2024-11-27 NOTE — BH INPATIENT PSYCHIATRY DISCHARGE NOTE - OTHER PAST PSYCHIATRIC HISTORY (INCLUDE DETAILS REGARDING ONSET, COURSE OF ILLNESS, INPATIENT/OUTPATIENT TREATMENT)
PPHx Bipolar Disorder, Alcohol Use Disorder  PMHx Rheumatoid Arthritis, Lupus  Multiple Prior Psychiatric Hospitalizations (most recently at [facility] around 2021)  Denies hx SA  Denies hx NSSIB/Violence  Denies current SI, HI, PI, AVH (Presents with disorganization and mood lability)    Pt is a 68yo  Female, single, domiciled in private apartment in [place], unemployed on Social Security skilled nursing, non-caregiver. Pt initially presented to [facility] ED, BIB daughter and APS worker a/b APS worker with concerns for worsening physical and psychiatric health in the context of chronic psychiatric medication non-adherence. APS Worker Ms. Bergman made initial visit to pt's apartment to find it in disarray with no clear walkway (hoarding situation) and pt appearing emaciated with stove unattended. APS Worker activated pt's daughter, after which both caregivers brought pt to [facility] ED for evaluation. Pt was admitted to [facility] for stabilization and evaluation on 10/24. Psychiatry was consulted and determined patient would benefit from inpatient admission to restart medications for her Bipolar Disorder as well as coordinating care prior to discharge. Pt would likely benefit from outpatient medication management, peer support, home healthcare, and intensive case management.

## 2024-11-27 NOTE — BH INPATIENT PSYCHIATRY PROGRESS NOTE - NSBHPSYCHOLCOGORIENT_PSY_A_CORE
Oriented to time, place, person, situation

## 2024-11-27 NOTE — BH INPATIENT PSYCHIATRY PROGRESS NOTE - NSTXDCOPNOINTERMD_PSY_ALL_CORE
psychopharm x15 minutes
psychopharm x15 minutes. TOO. 
psychopharm x15 minutes
psychopharm x15 minutes. TOO. 
psychopharm x15 minutes. TOO. 
psychopharm x15 minutes
psychopharm x15 minutes. TOO. 
psychopharm x15 minutes
psychopharm x15 minutes

## 2024-11-27 NOTE — BH INPATIENT PSYCHIATRY PROGRESS NOTE - NSBHFUPINTERVALHXFT_PSY_A_CORE
Patient seen in her room with Mayra noted. She enquired about discharge and was reminded that it was on Friday, she reported being upset about discharge date as she had not slept during her admission. Was offered prn medication such as melatonin, but refused. Denies si/hiavh or pi or acute medical problem.  Continues to refuse all medications, remains overall unchanged, verbally abusive, rude, cursing

## 2024-11-27 NOTE — BH DISCHARGE NOTE NURSING/SOCIAL WORK/PSYCH REHAB - NSCDUDCCRISIS_PSY_A_CORE
[facility]  1 (282) [facility]LL ([phone])  Text "WELL" to 65063  Website: www.[employer]/.[employer] 1 (187) 621-HOPE (1994) Website: www.safehorizon.org/.National Suicide Prevention Lifeline 3 (281) 317-0078/.  [employer]  1 (361) LIFENET (590-4734)/988 Suicide and Crisis Lifeline

## 2024-11-27 NOTE — BH DISCHARGE NOTE NURSING/SOCIAL WORK/PSYCH REHAB - NSDCADDINFO1FT_PSY_ALL_CORE
Appointment on Thursday, 12/05/2024 at 11:30AM. This is an in-person appointment. Please bring a copy of your insurance card to the appointment

## 2024-11-27 NOTE — BH INPATIENT PSYCHIATRY PROGRESS NOTE - NSTXPSYCHOGOALOTHER_PSY_ALL_CORE
Pt will participate in groups and milieu treatment structure of the unit
Pt. will participate in groups and milieu tx. structure of unit
Pt will participate in groups and milieu treatment structure of the unit

## 2024-11-27 NOTE — BH INPATIENT PSYCHIATRY PROGRESS NOTE - NSTXDCOPNODATEEST_PSY_ALL_CORE
28-Oct-2024
11-Nov-2024
11-Nov-2024
28-Oct-2024
11-Nov-2024
11-Nov-2024
28-Oct-2024
11-Nov-2024
28-Oct-2024
11-Nov-2024
28-Oct-2024
28-Oct-2024
11-Nov-2024

## 2024-11-27 NOTE — BH INPATIENT PSYCHIATRY PROGRESS NOTE - NSBHMSERELATED_PSY_A_CORE
Fair
Poor
Fair
Poor
Fair
Poor
Fair
Poor
Fair
Poor
Fair
Poor
Poor

## 2024-11-27 NOTE — BH INPATIENT PSYCHIATRY PROGRESS NOTE - PRN MEDS
MEDICATIONS  (PRN):  aluminum hydroxide/magnesium hydroxide/simethicone Suspension 30 milliLiter(s) Oral every 6 hours PRN Dyspepsia  ibuprofen  Tablet. 200 milliGRAM(s) Oral three times a day PRN Moderate Pain (4 - 6)  LORazepam     Tablet 1 milliGRAM(s) Oral every 1 hour PRN CIWA-Ar score 8 or greater  OLANZapine 5 milliGRAM(s) Oral every 8 hours PRN agitation  
MEDICATIONS  (PRN):  acetaminophen     Tablet .. 650 milliGRAM(s) Oral every 6 hours PRN Moderate Pain (4 - 6)  aluminum hydroxide/magnesium hydroxide/simethicone Suspension 30 milliLiter(s) Oral every 6 hours PRN Dyspepsia  ibuprofen  Tablet. 200 milliGRAM(s) Oral three times a day PRN Moderate Pain (4 - 6)  OLANZapine 5 milliGRAM(s) Oral every 8 hours PRN agitation  
MEDICATIONS  (PRN):  acetaminophen     Tablet .. 650 milliGRAM(s) Oral every 6 hours PRN Moderate Pain (4 - 6)  aluminum hydroxide/magnesium hydroxide/simethicone Suspension 30 milliLiter(s) Oral every 6 hours PRN Dyspepsia  ibuprofen  Tablet. 200 milliGRAM(s) Oral three times a day PRN Moderate Pain (4 - 6)  OLANZapine 5 milliGRAM(s) Oral every 8 hours PRN agitation  
MEDICATIONS  (PRN):  aluminum hydroxide/magnesium hydroxide/simethicone Suspension 30 milliLiter(s) Oral every 6 hours PRN Dyspepsia  ibuprofen  Tablet. 200 milliGRAM(s) Oral three times a day PRN Moderate Pain (4 - 6)  LORazepam     Tablet 1 milliGRAM(s) Oral every 1 hour PRN CIWA-Ar score 8 or greater  OLANZapine 5 milliGRAM(s) Oral every 8 hours PRN agitation  
MEDICATIONS  (PRN):  aluminum hydroxide/magnesium hydroxide/simethicone Suspension 30 milliLiter(s) Oral every 6 hours PRN Dyspepsia  ibuprofen  Tablet. 200 milliGRAM(s) Oral three times a day PRN Moderate Pain (4 - 6)  OLANZapine 5 milliGRAM(s) Oral every 8 hours PRN agitation  
MEDICATIONS  (PRN):  acetaminophen     Tablet .. 650 milliGRAM(s) Oral every 6 hours PRN Moderate Pain (4 - 6)  aluminum hydroxide/magnesium hydroxide/simethicone Suspension 30 milliLiter(s) Oral every 6 hours PRN Dyspepsia  ibuprofen  Tablet. 200 milliGRAM(s) Oral three times a day PRN Moderate Pain (4 - 6)  OLANZapine 5 milliGRAM(s) Oral every 8 hours PRN agitation  
MEDICATIONS  (PRN):  aluminum hydroxide/magnesium hydroxide/simethicone Suspension 30 milliLiter(s) Oral every 6 hours PRN Dyspepsia  ibuprofen  Tablet. 200 milliGRAM(s) Oral three times a day PRN Moderate Pain (4 - 6)  OLANZapine 5 milliGRAM(s) Oral every 8 hours PRN agitation  
MEDICATIONS  (PRN):  acetaminophen     Tablet .. 650 milliGRAM(s) Oral every 6 hours PRN Moderate Pain (4 - 6)  aluminum hydroxide/magnesium hydroxide/simethicone Suspension 30 milliLiter(s) Oral every 6 hours PRN Dyspepsia  ibuprofen  Tablet. 200 milliGRAM(s) Oral three times a day PRN Moderate Pain (4 - 6)  OLANZapine 5 milliGRAM(s) Oral every 8 hours PRN agitation  
MEDICATIONS  (PRN):  aluminum hydroxide/magnesium hydroxide/simethicone Suspension 30 milliLiter(s) Oral every 6 hours PRN Dyspepsia  ibuprofen  Tablet. 200 milliGRAM(s) Oral three times a day PRN Moderate Pain (4 - 6)  OLANZapine 5 milliGRAM(s) Oral every 8 hours PRN agitation  
MEDICATIONS  (PRN):  aluminum hydroxide/magnesium hydroxide/simethicone Suspension 30 milliLiter(s) Oral every 6 hours PRN Dyspepsia  ibuprofen  Tablet. 200 milliGRAM(s) Oral three times a day PRN Moderate Pain (4 - 6)  LORazepam     Tablet 1 milliGRAM(s) Oral every 1 hour PRN CIWA-Ar score 8 or greater  OLANZapine 5 milliGRAM(s) Oral every 8 hours PRN agitation  
MEDICATIONS  (PRN):  acetaminophen     Tablet .. 650 milliGRAM(s) Oral every 6 hours PRN Moderate Pain (4 - 6)  aluminum hydroxide/magnesium hydroxide/simethicone Suspension 30 milliLiter(s) Oral every 6 hours PRN Dyspepsia  ibuprofen  Tablet. 200 milliGRAM(s) Oral three times a day PRN Moderate Pain (4 - 6)  OLANZapine 5 milliGRAM(s) Oral every 8 hours PRN agitation  
MEDICATIONS  (PRN):  aluminum hydroxide/magnesium hydroxide/simethicone Suspension 30 milliLiter(s) Oral every 6 hours PRN Dyspepsia  ibuprofen  Tablet. 200 milliGRAM(s) Oral three times a day PRN Moderate Pain (4 - 6)  OLANZapine 5 milliGRAM(s) Oral every 8 hours PRN agitation  
MEDICATIONS  (PRN):  acetaminophen     Tablet .. 650 milliGRAM(s) Oral every 6 hours PRN Moderate Pain (4 - 6)  aluminum hydroxide/magnesium hydroxide/simethicone Suspension 30 milliLiter(s) Oral every 6 hours PRN Dyspepsia  ibuprofen  Tablet. 200 milliGRAM(s) Oral three times a day PRN Moderate Pain (4 - 6)  OLANZapine 5 milliGRAM(s) Oral every 8 hours PRN agitation

## 2024-11-27 NOTE — BH INPATIENT PSYCHIATRY PROGRESS NOTE - NSBHATTESTBILLONSITE_PSY_A_CORE
EDI to bill

## 2024-11-27 NOTE — BH INPATIENT PSYCHIATRY PROGRESS NOTE - NSBHCONSULTIPREASON_PSY_A_CORE
danger to self; mental illness expected to respond to inpatient care
other reason
danger to self; mental illness expected to respond to inpatient care

## 2024-11-27 NOTE — BH TREATMENT PLAN - NSTXPSYCHOINTERPR_PSY_ALL_CORE
Pt will continue to be invited and encouraged to attend all offered groups
Pt will continue to be invited and encouraged to attend all offered groups
Pt. will be invited and encouraged to join all offered groups
Pt will continue to be invited and encouraged to attend all offered groups
Pt. will be invited and encouraged to join all offered groups
Pt will continue to be invited and encouraged to attend all offered groups

## 2024-11-27 NOTE — BH TREATMENT PLAN - NSTXDCOPNOINTERRN_PSY_ALL_CORE
Encourage patient to speak with her treatment team to ensure adequate aftercare

## 2024-11-27 NOTE — BH INPATIENT PSYCHIATRY PROGRESS NOTE - NSBHTIMEACTIVITIESPERFORMED_PSY_A_CORE
coordination of care
time spent with patient, coordination of care

## 2024-11-27 NOTE — BH TREATMENT PLAN - NSTXPLANSTARTDATE_PSY_ALL_CORE
27-Nov-2024 08:50
20-Nov-2024 09:30
29-Oct-2024 09:34
28-Oct-2024 14:20
04-Nov-2024 09:53
18-Nov-2024 14:13
27-Nov-2024 16:39
20-Nov-2024 09:30
11-Nov-2024 15:02

## 2024-11-27 NOTE — BH INPATIENT PSYCHIATRY PROGRESS NOTE - NSBHATTESTAPPBILLTIME_PSY_A_CORE
I attest my time as EDI is greater than 50% of the total combined time spent on qualifying patient care activities. I have reviewed and verified the documentation.

## 2024-11-27 NOTE — BH INPATIENT PSYCHIATRY PROGRESS NOTE - NSTXPSYCHODATEEST_PSY_ALL_CORE
06-Nov-2024
06-Nov-2024
13-Nov-2024
30-Oct-2024
06-Nov-2024
13-Nov-2024
20-Nov-2024
13-Nov-2024
26-Oct-2024
06-Nov-2024
26-Oct-2024
30-Oct-2024
27-Nov-2024
30-Oct-2024
06-Nov-2024
20-Nov-2024
06-Nov-2024
30-Oct-2024
20-Nov-2024
20-Nov-2024
30-Oct-2024

## 2024-11-27 NOTE — BH INPATIENT PSYCHIATRY DISCHARGE NOTE - REASON FOR ADMISSION
Patient is a 69 year old AA female, single with adult daughter, domiciled alone previously with [facility] services, with PPH of bipolar disorder and depression per daughter and PMH of rheumatoid arthritis and lupus, who was referred by APS due to concern for poor self-care and was admitted to the medicine service for failure to thrive. Psychiatry was consulted for a psychiatric evaluation for bipolar disorder and patient was transferred to [facility] under involuntary status due to poor self-care, mood lability, and psychosis (bizarre delusion of being poisoned by ghosts, ).

## 2024-11-27 NOTE — BH DISCHARGE NOTE NURSING/SOCIAL WORK/PSYCH REHAB - PATIENT PORTAL LINK FT
You can access the FollowMyHealth Patient Portal offered by [facility] by registering at the following website: [email or web address]. By joining [employer]’s FollowMyHealth portal, you will also be able to view your health information using other applications (apps) compatible with our system.

## 2024-11-27 NOTE — BH INPATIENT PSYCHIATRY PROGRESS NOTE - NSBHMSEEYE_PSY_A_CORE
Good
Fair
Poor
Good
Good
Fair
Good
Poor
Poor
Good
Fair
Good
Fair
Poor
Fair
Poor
Fair
Poor
Fair
Poor
Good

## 2024-11-27 NOTE — BH DISCHARGE NOTE NURSING/SOCIAL WORK/PSYCH REHAB - NSDCPRGOAL_PSY_ALL_CORE
At the start of this admission, pt. was isolative and declined invitations to groups. Throughout her admission, pt. became more visible on the unit and more social with select peers. Pt. started attending therapy groups more frequently and then tapered down her attendance toward the end of her admission. Pt. demonstrated some insight into her triggers (people coming to her with their problems, disrespectful people), and coping strategies (dancing, knitting, baking/cooking, thinking positive thoughts, setting boundaries) during a safety planning session but stated that she didn't understand why she was admitted to the hospital. During the safety planning session, pt. was initially pleasant and linear. She shared that she "felt like a new woman" and that she learned a lot about herself during the admission. Pt. became increasingly irritable and disorganized expressing some vague paranoid ideations that everyone is a liar and that she cannot trust anyone. Pt. shared that upon discharge she intends to speak to a psychiatrist and that she will pray to god in the event of a relapse.

## 2024-11-27 NOTE — BH TREATMENT PLAN - NSTXPSYCHOINTERRN_PSY_ALL_CORE
Encourage patient to adhere with prescribed medications and treatment. Encourage patient to verbalize feelings and concerns.
continue to encourage patient to take meds . give positive feedback
Encourage patient to adhere with prescribed medications and treatment. Encourage patient to verbalize feelings and concerns.
continue to encourage patient to take meds . give positive feedback

## 2024-11-27 NOTE — BH TREATMENT PLAN - NSTXPSYCHODATETRGT_PSY_ALL_CORE
11-Nov-2024
27-Nov-2024
05-Nov-2024
27-Nov-2024
13-Nov-2024
02-Nov-2024
25-Nov-2024
27-Nov-2024
04-Dec-2024

## 2024-11-27 NOTE — BH INPATIENT PSYCHIATRY PROGRESS NOTE - NSTXDCOPNOGOAL_PSY_ALL_CORE
Other...

## 2024-11-27 NOTE — BH INPATIENT PSYCHIATRY PROGRESS NOTE - NSICDXBHSECONDARYDX_PSY_ALL_CORE
Alcohol abuse   F10.10  Severe protein-calorie malnutrition   E43  Systemic lupus   M32.9  Rheumatoid arthritis   M06.9

## 2024-11-27 NOTE — BH INPATIENT PSYCHIATRY PROGRESS NOTE - NSTXPSYCHOPROGRES_PSY_ALL_CORE
Improving
Improving
No Change
No Change
Improving
No Change
Improving
No Change
Improving

## 2024-11-27 NOTE — BH INPATIENT PSYCHIATRY PROGRESS NOTE - NSBHMSEJUDGE_PSY_A_CORE
Fair
Poor
Fair
Poor
Fair
Poor
Fair
Poor

## 2024-11-27 NOTE — BH INPATIENT PSYCHIATRY DISCHARGE NOTE - NSDCCPCAREPLAN_GEN_ALL_CORE_FT
PRINCIPAL DISCHARGE DIAGNOSIS  Diagnosis: Bipolar disorder with psychotic features  Assessment and Plan of Treatment:       SECONDARY DISCHARGE DIAGNOSES  Diagnosis: Severe protein-calorie malnutrition  Assessment and Plan of Treatment:     Diagnosis: Systemic lupus  Assessment and Plan of Treatment:     Diagnosis: Rheumatoid arthritis  Assessment and Plan of Treatment:      PRINCIPAL DISCHARGE DIAGNOSIS  Diagnosis: Bipolar disorder with psychotic features  Assessment and Plan of Treatment: Referral to ACT made, follow up with appointments and consider medications      SECONDARY DISCHARGE DIAGNOSES  Diagnosis: Severe protein-calorie malnutrition  Assessment and Plan of Treatment: Continue regular diet    Diagnosis: Systemic lupus  Assessment and Plan of Treatment: Referral to ACT made, follow up with appointments and consider medications    Diagnosis: Rheumatoid arthritis  Assessment and Plan of Treatment: Referral to ACT made, follow up with appointments and consider medications

## 2024-11-27 NOTE — BH INPATIENT PSYCHIATRY PROGRESS NOTE - NSBHATTESTATTENDNAMEFT_PSY_A_CORE
LUCIAN Roberto
LUCIAN Roberto
GUNNAR Diaz
LUCIAN Roberto
Abebe R
GUNNAR Diaz
LUCIAN Roberto
GUNNAR Diaz
GUNNAR Diaz
Abebe R
LUCIAN Roberto
Abebe R
LUCIAN Roberto

## 2024-11-27 NOTE — BH INPATIENT PSYCHIATRY PROGRESS NOTE - NSBHCHARTREVIEWVS_PSY_A_CORE FT
Vital Signs Last 24 Hrs  T(C): 36.4 (11-27-24 @ 16:05), Max: 36.4 (11-26-24 @ 16:41)  T(F): 97.6 (11-27-24 @ 16:05), Max: 97.6 (11-27-24 @ 09:00)  HR: 89 (11-27-24 @ 16:05) (72 - 89)  BP: 110/68 (11-27-24 @ 16:05) (102/68 - 127/77)  BP(mean): --  RR: 18 (11-27-24 @ 16:05) (18 - 18)  SpO2: 99% (11-27-24 @ 16:05) (99% - 99%)

## 2024-11-28 PROCEDURE — 93005 ELECTROCARDIOGRAM TRACING: CPT

## 2024-11-28 PROCEDURE — 85027 COMPLETE CBC AUTOMATED: CPT

## 2024-11-28 PROCEDURE — 80061 LIPID PANEL: CPT

## 2024-11-28 PROCEDURE — 84443 ASSAY THYROID STIM HORMONE: CPT

## 2024-11-28 PROCEDURE — 83036 HEMOGLOBIN GLYCOSYLATED A1C: CPT

## 2024-11-28 PROCEDURE — 97161 PT EVAL LOW COMPLEX 20 MIN: CPT

## 2024-11-28 PROCEDURE — 81003 URINALYSIS AUTO W/O SCOPE: CPT

## 2024-11-28 PROCEDURE — 36415 COLL VENOUS BLD VENIPUNCTURE: CPT

## 2024-11-28 PROCEDURE — 80053 COMPREHEN METABOLIC PANEL: CPT

## 2024-11-29 VITALS
DIASTOLIC BLOOD PRESSURE: 75 MMHG | HEART RATE: 63 BPM | SYSTOLIC BLOOD PRESSURE: 125 MMHG | TEMPERATURE: 98 F | OXYGEN SATURATION: 100 %

## 2024-11-29 PROCEDURE — 99238 HOSP IP/OBS DSCHRG MGMT 30/<: CPT

## 2024-11-29 NOTE — BH SAFETY PLAN - WARNING SIGN 1
Pt. completed a written safety plan and was provided a copy to take with her; an additional copy can be found in pt.'s chart on the unit.

## 2024-12-01 DIAGNOSIS — E55.9 VITAMIN D DEFICIENCY, UNSPECIFIED: ICD-10-CM

## 2024-12-01 DIAGNOSIS — F31.9 BIPOLAR DISORDER, UNSPECIFIED: ICD-10-CM

## 2024-12-01 DIAGNOSIS — M06.9 RHEUMATOID ARTHRITIS, UNSPECIFIED: ICD-10-CM

## 2024-12-01 DIAGNOSIS — E43 UNSPECIFIED SEVERE PROTEIN-CALORIE MALNUTRITION: ICD-10-CM

## 2024-12-01 DIAGNOSIS — G31.84 MILD COGNITIVE IMPAIRMENT OF UNCERTAIN OR UNKNOWN ETIOLOGY: ICD-10-CM

## 2024-12-01 DIAGNOSIS — M32.9 SYSTEMIC LUPUS ERYTHEMATOSUS, UNSPECIFIED: ICD-10-CM

## 2024-12-01 DIAGNOSIS — F17.210 NICOTINE DEPENDENCE, CIGARETTES, UNCOMPLICATED: ICD-10-CM
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
## 2024-12-01 DIAGNOSIS — F10.10 ALCOHOL ABUSE, UNCOMPLICATED: ICD-10-CM

## 2024-12-01 DIAGNOSIS — R62.7 ADULT FAILURE TO THRIVE: ICD-10-CM

## 2024-12-01 DIAGNOSIS — N39.41 URGE INCONTINENCE: ICD-10-CM

## 2024-12-02 NOTE — BH SOCIAL WORK CONFIRMATION FOLLOW UP NOTE - NSCOMMENTS_PSY_ALL_CORE
CARING CONTACT [[id number]] @ [0944] Patient discharged on [11/29/2024]. No Caring Call required as patient was assessed at LOW ACUTE SUICIDE RISK on admission.   ---   CARING CONTACT [[id number]] @ [5147] Patient discharged on [11/29/2024]. No Caring Call required as patient was assessed at LOW ACUTE SUICIDE RISK on admission.   ---  LINKAGE CALL [[id number]] @ [0883] SERENA called [[facility]] on [12/05/2024] to verify if patient attended appointment on [12/05/2024]. SERENA unable to confirm linkage as agency was unresponsive.

## 2024-12-03 ENCOUNTER — EMERGENCY (EMERGENCY)
Facility: HOSPITAL | Age: 69
LOS: 1 days | Discharge: HOME | End: 2024-12-03
Admitting: EMERGENCY MEDICINE
Payer: MEDICARE

## 2024-12-03 VITALS
RESPIRATION RATE: 18 BRPM | HEART RATE: 91 BPM | OXYGEN SATURATION: 95 % | DIASTOLIC BLOOD PRESSURE: 78 MMHG | HEIGHT: 61 IN | TEMPERATURE: 97 F | SYSTOLIC BLOOD PRESSURE: 155 MMHG | WEIGHT: 110.01 LBS

## 2024-12-03 DIAGNOSIS — Z98.89 OTHER SPECIFIED POSTPROCEDURAL STATES: Chronic | ICD-10-CM

## 2024-12-03 PROCEDURE — 99283 EMERGENCY DEPT VISIT LOW MDM: CPT

## 2024-12-03 RX ORDER — PREDNISONE 20 MG/1
1 TABLET ORAL
Qty: 10 | Refills: 0
Start: 2024-12-03 | End: 2024-12-07

## 2024-12-03 NOTE — ED PROVIDER NOTE - OBJECTIVE STATEMENT
70 y/o female w/ hx lupus arthritis, bipolar dz, etoh abuse p/w b/l hand pain, b/l knee pain, and b/l feet pain x 1 day, feels similar to exacerbations of her lupus arthritis.  Denies trauma/ injury.  Denies f/c, cp, sob, numbness/tingling/weakness to ext.  Has not seen rheumatologist in 3 yrs.  States has appt coming up in 2025.

## 2024-12-03 NOTE — ED ADULT NURSE NOTE - NSFALLUNIVINTERV_ED_ALL_ED
Bed/Stretcher in lowest position, wheels locked, appropriate side rails in place/Call bell, personal items and telephone in reach/Instruct patient to call for assistance before getting out of bed/chair/stretcher/Non-slip footwear applied when patient is off stretcher/[place] to call system/Physically safe environment - no spills, clutter or unnecessary equipment/Purposeful proactive rounding/Room/bathroom lighting operational, light cord in reach

## 2024-12-03 NOTE — ED PROVIDER NOTE - NS ED ROS FT
CONSTITUTIONAL: Denies fever and chills    RESPIRATORY: Denies SOB     CARDIOVASCULAR: Denies palpitations and chest pain.    MUSCULOSKELETAL: See HPI

## 2024-12-03 NOTE — ED PROVIDER NOTE - NSFOLLOWUPINSTRUCTIONS_ED_ALL_ED_FT
Thank you for visiting [facility] Emergency Department.      We saw you today for joint pain.    You may take prednisone as prescribed.  You may take tylenol 650-1000 mg every 6 hours as needed for pain.    Please know that no emergency visit is complete without follow-up with your primary care provider in 1 week.  Please bring copies of all discharge papers and results and show to your doctor.      Please continue taking all previous medications as instructed unless we discussed otherwise.     I appreciated your patience and hope you feel better soon.     Return to ER immediately if you develop fevers, chills, chest pain, shortness of breath, worsening and/or any concerning symptoms.

## 2024-12-03 NOTE — ED PROVIDER NOTE - CARE PROVIDER_API CALL
Rohan Davila  Rheumatology  [address]  [place] [address]  Phone: [phone]  Fax: [phone]  Follow Up Time:

## 2024-12-03 NOTE — ED ADULT TRIAGE NOTE - CHIEF COMPLAINT QUOTE
Pt presents to ED from home c.o generalized body pain. Pt has of lupus arthritis. Pt A&Ox4, NAD and ambulates w. cane. Pt presents to ED from home c.o generalized body pain. Pt has hx of lupus arthritis. Pt A&Ox4, NAD and ambulates w. cane.

## 2024-12-03 NOTE — ED ADULT NURSE NOTE - CHIEF COMPLAINT QUOTE
Pt presents to ED from home c.o generalized body pain. Pt has hx of lupus arthritis. Pt A&Ox4, NAD and ambulates w. cane.

## 2024-12-03 NOTE — ED ADULT NURSE NOTE - OBJECTIVE STATEMENT
70 y/o F hx RA, bipolar disorder, lupus presents to ED with Adult Protective Services , c/o generalized pain in hands, elbows, knees, ankles, feet. Pt requesting medication for pain. Pt denies chest pain, SOB, N/V/D, fever, chills, numbness, tingling, dizziness, lightheadedness. PT A&Ox4, respirations even and unlabored, skin color WDL warm and dry, pt is ambulatory with a steady gait. No acute distress observed.

## 2024-12-03 NOTE — ED PROVIDER NOTE - CLINICAL SUMMARY MEDICAL DECISION MAKING FREE TEXT BOX
70 y/o female w/ hx lupus arthritis, bipolar dz, etoh abuse p/w b/l hand pain, b/l knee pain, and b/l feet pain x 1 day, feels similar to exacerbations of her lupus arthritis.  Denies trauma/ injury.  Denies f/c, cp, sob, numbness/tingling/weakness to ext.  Has not seen rheumatologist in 3 yrs.  States has appt coming up in 2025.    Exam with chronic arthritic appearing changes b/l hands, feet  No signs infx/ septic joint  Suspect likely pain 2/2 known lupus/ arthritis  Pt requesting prednisone.  will give short course and advise close f/u with rheum

## 2024-12-03 NOTE — ED PROVIDER NOTE - PATIENT PORTAL LINK FT
You can access the FollowMyHealth Patient Portal offered by [facility] by registering at the following website: [email or web address]. By joining [employer]’s FollowMyHealth portal, you will also be able to view your health information using other applications (apps) compatible with our system.

## 2024-12-05 DIAGNOSIS — F31.9 BIPOLAR DISORDER, UNSPECIFIED: ICD-10-CM

## 2024-12-05 DIAGNOSIS — M19.90 UNSPECIFIED OSTEOARTHRITIS, UNSPECIFIED SITE: ICD-10-CM

## 2024-12-05 DIAGNOSIS — Z88.0 ALLERGY STATUS TO PENICILLIN: ICD-10-CM

## 2024-12-05 DIAGNOSIS — M79.672 PAIN IN LEFT FOOT: ICD-10-CM

## 2024-12-05 DIAGNOSIS — M25.562 PAIN IN LEFT KNEE: ICD-10-CM

## 2024-12-05 DIAGNOSIS — M79.642 PAIN IN LEFT HAND: ICD-10-CM

## 2024-12-05 DIAGNOSIS — F10.10 ALCOHOL ABUSE, UNCOMPLICATED: ICD-10-CM

## 2024-12-05 DIAGNOSIS — M79.671 PAIN IN RIGHT FOOT: ICD-10-CM

## 2024-12-05 DIAGNOSIS — M79.641 PAIN IN RIGHT HAND: ICD-10-CM

## 2024-12-05 DIAGNOSIS — M25.561 PAIN IN RIGHT KNEE: ICD-10-CM

## 2025-01-31 NOTE — ED PROVIDER NOTE - PHYSICAL EXAMINATION
Pt is requesting refills on this script      Redd Barkley is requesting a refill on the following medication(s):  Requested Prescriptions     Pending Prescriptions Disp Refills    potassium chloride (KLOR-CON M) 10 MEQ extended release tablet 90 tablet 0     Sig: Take 1 tablet by mouth daily       Last Visit Date (If Applicable):  10/21/2024    Next Visit Date:    3/24/2025  
CONSTITUTIONAL: Awake, alert.  Nontoxic, no acute distress.    HEAD: Normocephalic, atraumatic.    EYES: Conjunctivae clear without exudates or hemorrhage. Sclera is non-icteric.    ENT: Normal appearing external ears, nose, mucous membranes moist.    NECK: supple, trachea midline.    HEART:  Normal rate    LUNGS:  No acute respiratory distress.  Non-tachypneic and non-labored.      MUSCULOSKELETAL:  Chronic arthritic changes to hands/feet.  No overlying erythema or increased warmth.  No swelling.  Warm. No focal tenderness.  FROM b/l upper and lower extremities.  5/5 strength b/l upper and lower ext.  Sensation and motor function grossly intact.  Strong equal peripheral pulses b/l.   Cap refill < 2 b/l upper and lower ext.  All compartments soft.    SKIN: Skin in warm, dry and intact without rashes or lesions.  Appropriate color for ethnicity.    NEUROLOGICAL:  Patient is alert, oriented x person, place and time.    PSYCH: Appropriate mood and affect. Good judgment and insight.

## 2025-04-14 NOTE — BH DISCHARGE NOTE NURSING/SOCIAL WORK/PSYCH REHAB - NSDCOTHERNUM_GEN_ALL_CORE
April 14, 2025     Patient: Mil Allan   YOB: 2006   Date of Visit: 4/14/2025       To Whom it May Concern:    Mil Allan was seen in my clinic on 4/14/2025 at 9:45 am.     Please excuse Mil for her absence from school 4/8 and 4/10/25 she was hospitalized during this period.        Sincerely,         Trupti Allan MD    Medical information is confidential and cannot be disclosed without the written consent of the patient or her representative.    
[phone]  [email or web address]